# Patient Record
Sex: FEMALE | Race: BLACK OR AFRICAN AMERICAN | NOT HISPANIC OR LATINO | Employment: UNEMPLOYED | URBAN - METROPOLITAN AREA
[De-identification: names, ages, dates, MRNs, and addresses within clinical notes are randomized per-mention and may not be internally consistent; named-entity substitution may affect disease eponyms.]

---

## 2017-05-15 ENCOUNTER — OFFICE VISIT (OUTPATIENT)
Dept: OBSTETRICS AND GYNECOLOGY | Facility: CLINIC | Age: 27
End: 2017-05-15
Payer: MEDICAID

## 2017-05-15 ENCOUNTER — NURSE TRIAGE (OUTPATIENT)
Dept: ADMINISTRATIVE | Facility: CLINIC | Age: 27
End: 2017-05-15

## 2017-05-15 ENCOUNTER — LAB VISIT (OUTPATIENT)
Dept: LAB | Facility: HOSPITAL | Age: 27
End: 2017-05-15
Attending: NURSE PRACTITIONER
Payer: MEDICAID

## 2017-05-15 ENCOUNTER — TELEPHONE (OUTPATIENT)
Dept: OBSTETRICS AND GYNECOLOGY | Facility: CLINIC | Age: 27
End: 2017-05-15

## 2017-05-15 VITALS
HEIGHT: 62 IN | WEIGHT: 164.44 LBS | DIASTOLIC BLOOD PRESSURE: 86 MMHG | SYSTOLIC BLOOD PRESSURE: 120 MMHG | BODY MASS INDEX: 30.26 KG/M2

## 2017-05-15 DIAGNOSIS — O34.219 PREVIOUS CESAREAN SECTION COMPLICATING PREGNANCY: Primary | ICD-10-CM

## 2017-05-15 DIAGNOSIS — O34.219 PREVIOUS CESAREAN SECTION COMPLICATING PREGNANCY: ICD-10-CM

## 2017-05-15 LAB
ABO + RH BLD: NORMAL
BASOPHILS # BLD AUTO: 0.02 K/UL
BASOPHILS NFR BLD: 0.4 %
BILIRUB UR QL STRIP: NEGATIVE
BLD GP AB SCN CELLS X3 SERPL QL: NORMAL
CLARITY UR REFRACT.AUTO: CLEAR
COLOR UR AUTO: YELLOW
DIFFERENTIAL METHOD: ABNORMAL
EOSINOPHIL # BLD AUTO: 0.1 K/UL
EOSINOPHIL NFR BLD: 2.4 %
ERYTHROCYTE [DISTWIDTH] IN BLOOD BY AUTOMATED COUNT: 11.9 %
GLUCOSE UR QL STRIP: NEGATIVE
HCT VFR BLD AUTO: 38.9 %
HGB BLD-MCNC: 13.4 G/DL
HGB S BLD QL SOLY: NEGATIVE
HGB S BLD QL SOLY: NEGATIVE
HGB UR QL STRIP: NEGATIVE
KETONES UR QL STRIP: NEGATIVE
LEUKOCYTE ESTERASE UR QL STRIP: NEGATIVE
LYMPHOCYTES # BLD AUTO: 2.2 K/UL
LYMPHOCYTES NFR BLD: 40.3 %
MCH RBC QN AUTO: 32.4 PG
MCHC RBC AUTO-ENTMCNC: 34.4 %
MCV RBC AUTO: 94 FL
MONOCYTES # BLD AUTO: 0.5 K/UL
MONOCYTES NFR BLD: 8.6 %
NEUTROPHILS # BLD AUTO: 2.6 K/UL
NEUTROPHILS NFR BLD: 48.1 %
NITRITE UR QL STRIP: NEGATIVE
PH UR STRIP: 6 [PH] (ref 5–8)
PLATELET # BLD AUTO: 241 K/UL
PMV BLD AUTO: 10.8 FL
PROT UR QL STRIP: NEGATIVE
RBC # BLD AUTO: 4.14 M/UL
SP GR UR STRIP: 1.03 (ref 1–1.03)
URN SPEC COLLECT METH UR: NORMAL
UROBILINOGEN UR STRIP-ACNC: NEGATIVE EU/DL
WBC # BLD AUTO: 5.34 K/UL

## 2017-05-15 PROCEDURE — 86762 RUBELLA ANTIBODY: CPT

## 2017-05-15 PROCEDURE — 85025 COMPLETE CBC W/AUTO DIFF WBC: CPT

## 2017-05-15 PROCEDURE — 86703 HIV-1/HIV-2 1 RESULT ANTBDY: CPT

## 2017-05-15 PROCEDURE — 85660 RBC SICKLE CELL TEST: CPT

## 2017-05-15 PROCEDURE — 36415 COLL VENOUS BLD VENIPUNCTURE: CPT

## 2017-05-15 PROCEDURE — 86900 BLOOD TYPING SEROLOGIC ABO: CPT

## 2017-05-15 PROCEDURE — 86850 RBC ANTIBODY SCREEN: CPT

## 2017-05-15 PROCEDURE — 86592 SYPHILIS TEST NON-TREP QUAL: CPT

## 2017-05-15 PROCEDURE — 87340 HEPATITIS B SURFACE AG IA: CPT

## 2017-05-15 PROCEDURE — 99999 PR PBB SHADOW E&M-EST. PATIENT-LVL III: CPT | Mod: PBBFAC,,, | Performed by: NURSE PRACTITIONER

## 2017-05-15 PROCEDURE — 99203 OFFICE O/P NEW LOW 30 MIN: CPT | Mod: TH,S$PBB,, | Performed by: NURSE PRACTITIONER

## 2017-05-15 RX ORDER — PROMETHAZINE HYDROCHLORIDE 25 MG/1
25 TABLET ORAL EVERY 6 HOURS PRN
Qty: 30 TABLET | Refills: 0 | Status: SHIPPED | OUTPATIENT
Start: 2017-05-15 | End: 2017-05-16 | Stop reason: SDUPTHER

## 2017-05-15 NOTE — PROGRESS NOTES
"CC: Risk assessment    Hesham Palacios is a 27 y.o. female  presents for risk assessment.  LMP: Patient's last menstrual period was 2017. STANTON 2017. 6 w 2 d. OB h/o 2 term C/S( FTP and repeat) Patient had a h/o PIH with first delivery. Same FOB. Is breast feeding. Last pap exam was normal, . HSV, no outbreaks.      Past Medical History:   Diagnosis Date    Anemia     Herpes simplex virus (HSV) infection     Hx of migraine headaches      Past Surgical History:   Procedure Laterality Date     SECTION      x 2    DILATION AND CURETTAGE OF UTERUS       Social History     Social History    Marital status:      Spouse name: N/A    Number of children: N/A    Years of education: N/A     Occupational History    Not on file.     Social History Main Topics    Smoking status: Never Smoker    Smokeless tobacco: Never Used    Alcohol use No    Drug use: No    Sexual activity: Not Currently     Partners: Male     Birth control/ protection: None     Other Topics Concern    Not on file     Social History Narrative     Family History   Problem Relation Age of Onset    Breast cancer Neg Hx     Colon cancer Neg Hx     Ovarian cancer Neg Hx      OB History      Para Term  AB TAB SAB Ectopic Multiple Living    2 2 2      0 2          /86 (BP Location: Right arm, Patient Position: Sitting, BP Method: Manual)  Ht 5' 2" (1.575 m)  Wt 74.6 kg (164 lb 7.4 oz)  LMP 2017  BMI 30.08 kg/m2      ROS:  GENERAL: Denies weight gain or weight loss. Feeling well overall.   SKIN: Denies rash or lesions.   HEAD: Denies head injury or headache.   NODES: Denies enlarged lymph nodes.   CHEST: Denies chest pain or shortness of breath.   CARDIOVASCULAR: Denies palpitations or left sided chest pain.   ABDOMEN: No abdominal pain, constipation, diarrhea, nausea, vomiting or rectal bleeding.   URINARY: No frequency, dysuria, hematuria, or burning on " urination.  REPRODUCTIVE: See HPI.   BREASTS: The patient performs breast self-examination and denies pain, lumps, or nipple discharge.   HEMATOLOGIC: No easy bruisability or excessive bleeding.   MUSCULOSKELETAL: Denies joint pain or swelling.   NEUROLOGIC: Denies syncope or weakness.   PSYCHIATRIC: Denies depression, anxiety or mood swings.    PHYSICAL EXAM:  APPEARANCE: Obese AA female, in no acute distress.  AFFECT: WNL, alert and oriented x 3  SKIN: No acne or hirsutism  NECK: Neck symmetric without masses or thyromegaly  NODES: No inguinal, cervical, axillary, or femoral lymph node enlargement  CHEST: Good respiratory effect  ABDOMEN: Soft.  No tenderness or masses.  PELVIC: Normal external genitalia without lesions.  Normal hair distribution.  Adequate perineal body, normal urethral meatus.  Vagina moist and well rugated without lesions or discharge.  Cervix pink, without lesions, discharge or tenderness. Bimanual exam shows uterus to be 6-7 week size, regular, mobile and nontender.  Adnexa without masses or tenderness.       PLAN:  Initial labs  Initial OB visit and ultrasound

## 2017-05-15 NOTE — TELEPHONE ENCOUNTER
Reason for Disposition   Caller has medication question, adult has minor symptoms, caller declines triage, and triager answers question    Answer Assessment - Initial Assessment Questions  Pt seen today- stated med for nausea was to be sent to pharmacy but they denied reciept    Protocols used: ST MEDICATION QUESTION CALL-A-    Called phenergan per med card to  at [facility] per pt's chart.

## 2017-05-15 NOTE — TELEPHONE ENCOUNTER
----- Message from Nimo Spivey sent at 5/15/2017  4:19 PM CDT -----  Contact: Pt   States she is calling to follow up on her rx being called in to her pharm and can be reached at [phone]//erwin/liliam       Pt pharm is   [facility] [id number] [place] [address] - [address]  [address] [facility] [address]  Phone: [phone] Fax: [phone]

## 2017-05-15 NOTE — MR AVS SNAPSHOT
[facility]/ GYN  [address]  [place] [address]  Phone: [phone]  Fax: [phone]                  Hesham Palacios   5/15/2017 10:00 AM   Office Visit    Description:  Female : 1990   Provider:  Meghana Messer NP   Department:  [facility]           Reason for Visit     Possible Pregnancy           Diagnoses this Visit        Comments    Previous  section complicating pregnancy    -  Primary            To Do List           Future Appointments        Provider Department Dept Phone    5/15/2017 1:45 PM LAB, SAME DAY [facility] [phone]    2017 2:00 PM ULTRASOUND, OB-GYN [facility] OB/ [facility] [phone]    2017 2:45 PM Viky Hsu CNM [facility] OB/ -[phone]      Goals (5 Years of Data)     None       These Medications        Disp Refills Start End    promethazine (PHENERGAN) 25 MG tablet 30 tablet 0 5/15/2017     Take 1 tablet (25 mg total) by mouth every 6 (six) hours as needed for Nausea. - Oral    Pharmacy: [facility] [address] [place] [address] Ph #: [phone]         [facility]     [facility] Nurse Care [facility] - 24/7 Assistance  Unless otherwise directed by your provider, please contact [facility], our nurse care line that is available for 24/7 assistance.     Registered nurses in the [facility] provide: appointment scheduling, clinical advisement, health education, and other advisory services.  Call: [phone] (toll free)               Medications           Message regarding Medications     Verify the changes and/or additions to your medication regime listed below are the same as discussed with your clinician today.  If any of these changes or additions are incorrect, please notify your healthcare provider.        START taking these NEW medications        Refills    promethazine (PHENERGAN) 25 MG tablet 0    Sig:  "Take 1 tablet (25 mg total) by mouth every 6 (six) hours as needed for Nausea.    Class: Print    Route: Oral      STOP taking these medications     alprazolam (XANAX) 0.25 MG tablet Take 1 tablet (0.25 mg total) by mouth 2 (two) times daily as needed for Anxiety.    ibuprofen (ADVIL,MOTRIN) 600 MG tablet Take 600 mg by mouth 3 (three) times daily.           Verify that the below list of medications is an accurate representation of the medications you are currently taking.  If none reported, the list may be blank. If incorrect, please contact your healthcare provider. Carry this list with you in case of emergency.           Current Medications     promethazine (PHENERGAN) 25 MG tablet Take 1 tablet (25 mg total) by mouth every 6 (six) hours as needed for Nausea.           Clinical Reference Information           Your Vitals Were     BP Height Weight Last Period BMI    120/86 (BP Location: Right arm, Patient Position: Sitting, BP Method: Manual) 5' 2" (1.575 m) 74.6 kg (164 lb 7.4 oz) 04/01/2017 30.08 kg/m2      Blood Pressure          Most Recent Value    BP  120/86      Allergies as of 5/15/2017     Morphine      Immunizations Administered on Date of Encounter - 5/15/2017     None      Orders Placed During Today's Visit      Normal Orders This Visit    C. trachomatis/N. gonorrhoeae by AMP DNA Cervicovaginal     POCT urine pregnancy     Urinalysis     Urine culture     Future Labs/Procedures Expected by Expires    CBC auto differential  5/15/2017 7/14/2018    Glucose, fasting  5/15/2017 7/14/2018    Hepatitis B surface antigen  5/15/2017 7/14/2018    HIV-1 and HIV-2 antibodies  5/15/2017 7/14/2018    RPR  5/15/2017 7/14/2018    Rubella antibody, IgG  5/15/2017 7/14/2018    SICKLE CELL SCREEN  5/15/2017 7/14/2018    SICKLE CELL SCREEN  5/15/2017 7/14/2018    Type & Screen - Ob Profile  5/15/2017 7/14/2018    US OB/GYN Procedure ([facility])  As directed 5/15/2018      [facility] Sign-Up     Activating your [facility] " account is as easy as 1-2-3!     1) Visit [email or web address], select Sign Up Now, enter this activation code and your date of birth, then select Next.  Activation code not generated  Current Patient Portal Status: Account disabled      2) Create a username and password to use when you visit [facility] in the future and select a security question in case you lose your password and select Next.    3) Enter your e-mail address and click Sign Up!    Additional Information  If you have questions, please e-mail [email or web address] or call [phone] to talk to our [facility] staff. Remember, [facility] is NOT to be used for urgent needs. For medical emergencies, dial 911.         Language Assistance Services     ATTENTION: Language assistance services are available, free of charge. Please call [phone].      ATENCIÓN: Si habla español, tiene a cruz disposición servicios gratuitos de asistencia lingüística. Llame al [phone].     CHÚ Ý: N?u b?n nói Ti?ng Vi?t, có các d?ch v? h? tr? ngôn ng? mi?n phí dành cho b?n. G?i s? [phone].         [facility] complies with applicable Federal civil rights laws and does not discriminate on the basis of race, color, national origin, age, disability, or sex.

## 2017-05-16 ENCOUNTER — TELEPHONE (OUTPATIENT)
Dept: OBSTETRICS AND GYNECOLOGY | Facility: CLINIC | Age: 27
End: 2017-05-16

## 2017-05-16 LAB
BACTERIA UR CULT: NO GROWTH
C TRACH DNA SPEC QL NAA+PROBE: NOT DETECTED
HBV SURFACE AG SERPL QL IA: NEGATIVE
HIV 1+2 AB+HIV1 P24 AG SERPL QL IA: NEGATIVE
N GONORRHOEA DNA SPEC QL NAA+PROBE: NOT DETECTED
RPR SER QL: NORMAL
RUBV IGG SER-ACNC: 70.4 IU/ML
RUBV IGG SER-IMP: REACTIVE

## 2017-05-16 RX ORDER — PROMETHAZINE HYDROCHLORIDE 25 MG/1
25 TABLET ORAL EVERY 6 HOURS PRN
Qty: 30 TABLET | Refills: 0 | Status: SHIPPED | OUTPATIENT
Start: 2017-05-16 | End: 2017-06-22

## 2017-05-16 NOTE — TELEPHONE ENCOUNTER
----- Message from Meghana Messer NP sent at 5/16/2017  8:43 AM CDT -----  Please call patient and inform her that labs are normal.

## 2017-05-23 ENCOUNTER — INITIAL PRENATAL (OUTPATIENT)
Dept: OBSTETRICS AND GYNECOLOGY | Facility: CLINIC | Age: 27
End: 2017-05-23
Payer: MEDICAID

## 2017-05-23 ENCOUNTER — PROCEDURE VISIT (OUTPATIENT)
Dept: OBSTETRICS AND GYNECOLOGY | Facility: CLINIC | Age: 27
End: 2017-05-23
Payer: MEDICAID

## 2017-05-23 VITALS — DIASTOLIC BLOOD PRESSURE: 62 MMHG | BODY MASS INDEX: 30.6 KG/M2 | SYSTOLIC BLOOD PRESSURE: 122 MMHG | WEIGHT: 167.31 LBS

## 2017-05-23 DIAGNOSIS — O09.291 HISTORY OF PRE-ECLAMPSIA IN PRIOR PREGNANCY, CURRENTLY PREGNANT, FIRST TRIMESTER: Primary | ICD-10-CM

## 2017-05-23 DIAGNOSIS — Z98.891 HISTORY OF CESAREAN SECTION: ICD-10-CM

## 2017-05-23 DIAGNOSIS — O34.219 PREVIOUS CESAREAN SECTION COMPLICATING PREGNANCY: ICD-10-CM

## 2017-05-23 PROCEDURE — 99204 OFFICE O/P NEW MOD 45 MIN: CPT | Mod: TH,S$PBB,, | Performed by: ADVANCED PRACTICE MIDWIFE

## 2017-05-23 PROCEDURE — 76801 OB US < 14 WKS SINGLE FETUS: CPT | Mod: 26,S$PBB,, | Performed by: OBSTETRICS & GYNECOLOGY

## 2017-05-23 PROCEDURE — 99213 OFFICE O/P EST LOW 20 MIN: CPT | Mod: PBBFAC | Performed by: ADVANCED PRACTICE MIDWIFE

## 2017-05-23 PROCEDURE — 99999 PR PBB SHADOW E&M-EST. PATIENT-LVL III: CPT | Mod: PBBFAC,,, | Performed by: ADVANCED PRACTICE MIDWIFE

## 2017-05-23 NOTE — PROGRESS NOTES
NOBV, oriented to practice, labs reviewed, intro to coffective andreia, blue bag materials reviewed, warning signs discussed, Zika and dietary recommendations made. Pt denies concerns/complaints. Ayana/al

## 2017-06-12 ENCOUNTER — PATIENT MESSAGE (OUTPATIENT)
Dept: OBSTETRICS AND GYNECOLOGY | Facility: CLINIC | Age: 27
End: 2017-06-12

## 2017-06-22 ENCOUNTER — ROUTINE PRENATAL (OUTPATIENT)
Dept: OBSTETRICS AND GYNECOLOGY | Facility: CLINIC | Age: 27
End: 2017-06-22
Payer: MEDICAID

## 2017-06-22 VITALS
DIASTOLIC BLOOD PRESSURE: 68 MMHG | WEIGHT: 171.75 LBS | SYSTOLIC BLOOD PRESSURE: 104 MMHG | BODY MASS INDEX: 31.41 KG/M2

## 2017-06-22 DIAGNOSIS — Z98.891 HISTORY OF CESAREAN SECTION: ICD-10-CM

## 2017-06-22 PROBLEM — B00.9 HERPES SIMPLEX VIRUS TYPE 2 (HSV-2) INFECTION AFFECTING PREGNANCY IN FIRST TRIMESTER, ANTEPARTUM: Status: ACTIVE | Noted: 2017-06-22

## 2017-06-22 PROBLEM — O98.511 HERPES SIMPLEX VIRUS TYPE 2 (HSV-2) INFECTION AFFECTING PREGNANCY IN FIRST TRIMESTER, ANTEPARTUM: Status: ACTIVE | Noted: 2017-06-22

## 2017-06-22 PROCEDURE — 99213 OFFICE O/P EST LOW 20 MIN: CPT | Mod: TH,S$PBB,, | Performed by: OBSTETRICS & GYNECOLOGY

## 2017-06-22 PROCEDURE — 99212 OFFICE O/P EST SF 10 MIN: CPT | Mod: PBBFAC | Performed by: OBSTETRICS & GYNECOLOGY

## 2017-06-22 PROCEDURE — 99999 PR PBB SHADOW E&M-EST. PATIENT-LVL II: CPT | Mod: PBBFAC,,, | Performed by: OBSTETRICS & GYNECOLOGY

## 2017-06-22 NOTE — PROGRESS NOTES
Rec daily ASA due to history of pre-e, admits did not take regularly with last pregnancy  Still with residual nausea, improving  Desires QUAD between 16-20 weeks   Desires repeat  with BTL  Still currently breastfeeding 3 year old

## 2017-07-20 ENCOUNTER — ROUTINE PRENATAL (OUTPATIENT)
Dept: OBSTETRICS AND GYNECOLOGY | Facility: CLINIC | Age: 27
End: 2017-07-20
Payer: MEDICAID

## 2017-07-20 ENCOUNTER — LAB VISIT (OUTPATIENT)
Dept: LAB | Facility: HOSPITAL | Age: 27
End: 2017-07-20
Attending: MIDWIFE
Payer: MEDICAID

## 2017-07-20 VITALS
SYSTOLIC BLOOD PRESSURE: 120 MMHG | WEIGHT: 174.81 LBS | DIASTOLIC BLOOD PRESSURE: 76 MMHG | BODY MASS INDEX: 31.98 KG/M2

## 2017-07-20 DIAGNOSIS — Z3A.15 15 WEEKS GESTATION OF PREGNANCY: ICD-10-CM

## 2017-07-20 DIAGNOSIS — Z98.891 HISTORY OF CESAREAN SECTION: Primary | ICD-10-CM

## 2017-07-20 DIAGNOSIS — O09.292 HISTORY OF PRE-ECLAMPSIA IN PRIOR PREGNANCY, CURRENTLY PREGNANT, SECOND TRIMESTER: ICD-10-CM

## 2017-07-20 DIAGNOSIS — Z36.3 ANTENATAL SCREENING FOR MALFORMATION USING ULTRASONICS: ICD-10-CM

## 2017-07-20 PROCEDURE — 81511 FTL CGEN ABNOR FOUR ANAL: CPT

## 2017-07-20 PROCEDURE — 99999 PR PBB SHADOW E&M-EST. PATIENT-LVL II: CPT | Mod: PBBFAC,,, | Performed by: MIDWIFE

## 2017-07-20 PROCEDURE — 36415 COLL VENOUS BLD VENIPUNCTURE: CPT

## 2017-07-20 PROCEDURE — 99212 OFFICE O/P EST SF 10 MIN: CPT | Mod: TH,S$PBB,, | Performed by: MIDWIFE

## 2017-07-20 NOTE — PROGRESS NOTES
Complaints today: acne on face and back    /76   Wt 79.3 kg (174 lb 13.2 oz)   LMP 2017   BMI 31.98 kg/m²     27 y.o., at 15w5d by Estimated Date of Delivery: 18  Patient Active Problem List   Diagnosis    History of  section    History of pre-eclampsia in prior pregnancy, currently pregnant    Herpes simplex virus type 2 (HSV-2) infection affecting pregnancy in first trimester, antepartum     OB History    Para Term  AB Living   3 2 2     2   SAB TAB Ectopic Multiple Live Births         0 2      # Outcome Date GA Lbr Virgilio/2nd Weight Sex Delivery Anes PTL Lv   3 Current            2 Term 16 41w1d  3.94 kg (8 lb 11 oz) M CS-LTranv EPI N DANIEL   1 Term 11/23/10 40w0d  3.771 kg (8 lb 5 oz) M CS-Unspec   DANIEL      Complications: Preeclampsia          Dating reviewed    Allergies and problem list reviewed and updated    Medical and surgical history reviewed    Prenatal labs reviewed and updated    Physical Exam:  ABD: soft, gravid, nontender  Mild acne noted on face and back    Assessment:  Previous c/s    Plan:   May use mild facial cleanser as needed  Quad screen next visit  Anatomy u/s next visit   follow up 4 Weeks    DELFINA Glez

## 2017-07-24 LAB
# FETUSES US: NORMAL
2ND TRIMESTER 4 SCREEN PNL SERPL: NEGATIVE
2ND TRIMESTER 4 SCREEN SERPL-IMP: NORMAL
AFP MOM SERPL: 0.8
AFP SERPL-MCNC: 28.3 NG/ML
AGE AT DELIVERY: 26
B-HCG MOM SERPL: 0.72
B-HCG SERPL-ACNC: 25.1 IU/ML
FET TS 21 RISK FROM MAT AGE: NORMAL
GA (DAYS): 2 D
GA (WEEKS): 16 WK
GA METHOD: NORMAL
IDDM PATIENT QL: NORMAL
INHIBIN A MOM SERPL: 0.75
INHIBIN A SERPL-MCNC: 112.7 PG/ML
TS 18 RISK FETUS: NORMAL
TS 21 RISK FETUS: NORMAL
U ESTRIOL MOM SERPL: 0.71
U ESTRIOL SERPL-MCNC: 0.6 NG/ML

## 2017-08-17 ENCOUNTER — ROUTINE PRENATAL (OUTPATIENT)
Dept: OBSTETRICS AND GYNECOLOGY | Facility: CLINIC | Age: 27
End: 2017-08-17
Payer: MEDICAID

## 2017-08-17 ENCOUNTER — PROCEDURE VISIT (OUTPATIENT)
Dept: OBSTETRICS AND GYNECOLOGY | Facility: CLINIC | Age: 27
End: 2017-08-17
Payer: MEDICAID

## 2017-08-17 VITALS
DIASTOLIC BLOOD PRESSURE: 86 MMHG | WEIGHT: 181.88 LBS | BODY MASS INDEX: 33.27 KG/M2 | SYSTOLIC BLOOD PRESSURE: 124 MMHG

## 2017-08-17 DIAGNOSIS — Z98.891 HISTORY OF CESAREAN SECTION: Primary | ICD-10-CM

## 2017-08-17 DIAGNOSIS — Z36.3 ANTENATAL SCREENING FOR MALFORMATION USING ULTRASONICS: ICD-10-CM

## 2017-08-17 DIAGNOSIS — O09.292 HISTORY OF PRE-ECLAMPSIA IN PRIOR PREGNANCY, CURRENTLY PREGNANT, SECOND TRIMESTER: ICD-10-CM

## 2017-08-17 DIAGNOSIS — Z3A.19 19 WEEKS GESTATION OF PREGNANCY: ICD-10-CM

## 2017-08-17 PROCEDURE — 76805 OB US >/= 14 WKS SNGL FETUS: CPT | Mod: 26,S$PBB,, | Performed by: OBSTETRICS & GYNECOLOGY

## 2017-08-17 PROCEDURE — 99999 PR PBB SHADOW E&M-EST. PATIENT-LVL II: CPT | Mod: PBBFAC,,, | Performed by: MIDWIFE

## 2017-08-17 PROCEDURE — 99212 OFFICE O/P EST SF 10 MIN: CPT | Mod: TH,S$PBB,25, | Performed by: MIDWIFE

## 2017-08-17 PROCEDURE — 3008F BODY MASS INDEX DOCD: CPT | Mod: ,,, | Performed by: MIDWIFE

## 2017-08-17 PROCEDURE — 99212 OFFICE O/P EST SF 10 MIN: CPT | Mod: PBBFAC | Performed by: MIDWIFE

## 2017-08-17 RX ORDER — PANTOPRAZOLE SODIUM 20 MG/1
20 TABLET, DELAYED RELEASE ORAL DAILY
Qty: 30 TABLET | Refills: 3 | Status: SHIPPED | OUTPATIENT
Start: 2017-08-17 | End: 2017-12-21

## 2017-08-17 RX ORDER — NAPROXEN SODIUM 220 MG/1
81 TABLET, FILM COATED ORAL DAILY
COMMUNITY
End: 2017-12-21

## 2017-08-17 NOTE — PROGRESS NOTES
Complaints today: indigestion    LMP 2017     27 y.o., at 19w5d by Estimated Date of Delivery: 18  Patient Active Problem List   Diagnosis    History of  section    History of pre-eclampsia in prior pregnancy, currently pregnant    Herpes simplex virus type 2 (HSV-2) infection affecting pregnancy in first trimester, antepartum     OB History    Para Term  AB Living   3 2 2     2   SAB TAB Ectopic Multiple Live Births         0 2      # Outcome Date GA Lbr Virgilio/2nd Weight Sex Delivery Anes PTL Lv   3 Current            2 Term 16 41w1d  3.94 kg (8 lb 11 oz) M CS-LTranv EPI N DANIEL   1 Term 11/23/10 40w0d  3.771 kg (8 lb 5 oz) M CS-Unspec   DANIEL      Complications: Preeclampsia          Dating reviewed    Allergies and problem list reviewed and updated    Medical and surgical history reviewed    Prenatal labs reviewed and updated    Physical Exam:  ABD: soft, gravid, nontender,   US: Sub opt heart, possible arrythmia vs heart block, all other anatomy normal    Assessment:  Possible arrythmia vs heart block    Plan:   MFM in 2 weeks   follow up 2Weeks, kick counts, labor precautions

## 2017-08-30 ENCOUNTER — OFFICE VISIT (OUTPATIENT)
Dept: OBSTETRICS AND GYNECOLOGY | Facility: CLINIC | Age: 27
End: 2017-08-30
Payer: MEDICAID

## 2017-08-30 DIAGNOSIS — O09.299 HISTORY OF PRE-ECLAMPSIA IN PRIOR PREGNANCY, CURRENTLY PREGNANT: Primary | ICD-10-CM

## 2017-08-30 DIAGNOSIS — O09.292 HISTORY OF PRE-ECLAMPSIA IN PRIOR PREGNANCY, CURRENTLY PREGNANT, SECOND TRIMESTER: ICD-10-CM

## 2017-08-30 DIAGNOSIS — O36.8390 FETAL ARRHYTHMIA AFFECTING PREGNANCY, ANTEPARTUM: ICD-10-CM

## 2017-08-30 PROCEDURE — 99999 PR PBB SHADOW E&M-EST. PATIENT-LVL I: CPT | Mod: PBBFAC,,,

## 2017-08-30 PROCEDURE — 99212 OFFICE O/P EST SF 10 MIN: CPT | Mod: GT,S$PBB,TH,25 | Performed by: OBSTETRICS & GYNECOLOGY

## 2017-08-30 PROCEDURE — 76811 OB US DETAILED SNGL FETUS: CPT | Mod: 26,S$PBB,, | Performed by: OBSTETRICS & GYNECOLOGY

## 2017-08-30 PROCEDURE — 76811 OB US DETAILED SNGL FETUS: CPT | Mod: PBBFAC,PO | Performed by: OBSTETRICS & GYNECOLOGY

## 2017-08-30 PROCEDURE — 99211 OFF/OP EST MAY X REQ PHY/QHP: CPT | Mod: PBBFAC,PO

## 2017-08-30 NOTE — PROGRESS NOTES
Today the finding of premature atrial contractions (PACs) were noted on ultrasound.  PACs, along with PVCs, are the most common of fetal arrythmia accounting for over 90% of fetal arrythmias.  PACs are historically benign and mot resolve by the time of delivery.  It is extremely rare for PACs to cause problems in the .  They usually require no treatment and can be decreased with the limiting of maternal caffeine, alcohol and nicotine intake.  She denies any caffeine , tobacco or decongestant intake.  Suggest reevaluation 2 weeks for FHR.  Of note the FOB gives a history of an arrhythmia in himself, his brother and dad that was diagnosed as athletes arrhythmia.  Not sure if this is an actual arrhythmia or exaggerated respiratory variation.  He will get records before next visit.

## 2017-08-31 ENCOUNTER — TELEPHONE (OUTPATIENT)
Dept: OBSTETRICS AND GYNECOLOGY | Facility: CLINIC | Age: 27
End: 2017-08-31

## 2017-08-31 NOTE — TELEPHONE ENCOUNTER
----- Message from Kira Sierra sent at 8/31/2017  9:33 AM CDT -----  Contact: PT   Pt was suppose to get information on her babies father cardo records for dr because baby has an irregular heart beat,,,Dr. Shwetha Majano ,,, please call pt back at [phone]

## 2017-08-31 NOTE — TELEPHONE ENCOUNTER
----- Message from Kaylen Avery sent at 8/31/2017  1:40 PM CDT -----  Contact: patient  Returning your call. Please call patient ASAP today @ [phone]. Thanks, arturo

## 2017-09-13 ENCOUNTER — PATIENT MESSAGE (OUTPATIENT)
Dept: OBSTETRICS AND GYNECOLOGY | Facility: CLINIC | Age: 27
End: 2017-09-13

## 2017-09-14 ENCOUNTER — TELEPHONE (OUTPATIENT)
Dept: OBSTETRICS AND GYNECOLOGY | Facility: CLINIC | Age: 27
End: 2017-09-14

## 2017-09-14 ENCOUNTER — ROUTINE PRENATAL (OUTPATIENT)
Dept: OBSTETRICS AND GYNECOLOGY | Facility: CLINIC | Age: 27
End: 2017-09-14
Payer: MEDICAID

## 2017-09-14 VITALS
BODY MASS INDEX: 34.35 KG/M2 | SYSTOLIC BLOOD PRESSURE: 120 MMHG | WEIGHT: 187.81 LBS | DIASTOLIC BLOOD PRESSURE: 70 MMHG

## 2017-09-14 DIAGNOSIS — Z98.891 HISTORY OF CESAREAN SECTION: Primary | ICD-10-CM

## 2017-09-14 PROCEDURE — 3008F BODY MASS INDEX DOCD: CPT | Mod: ,,, | Performed by: ADVANCED PRACTICE MIDWIFE

## 2017-09-14 PROCEDURE — 99999 PR PBB SHADOW E&M-EST. PATIENT-LVL II: CPT | Mod: PBBFAC,,, | Performed by: ADVANCED PRACTICE MIDWIFE

## 2017-09-14 PROCEDURE — 99213 OFFICE O/P EST LOW 20 MIN: CPT | Mod: TH,S$PBB,, | Performed by: ADVANCED PRACTICE MIDWIFE

## 2017-09-14 PROCEDURE — 99212 OFFICE O/P EST SF 10 MIN: CPT | Mod: PBBFAC,PO | Performed by: ADVANCED PRACTICE MIDWIFE

## 2017-09-14 RX ORDER — BUTALBITAL, ACETAMINOPHEN AND CAFFEINE 50; 325; 40 MG/1; MG/1; MG/1
1 TABLET ORAL EVERY 4 HOURS PRN
Qty: 30 TABLET | Refills: 1 | Status: SHIPPED | OUTPATIENT
Start: 2017-09-14 | End: 2017-10-14

## 2017-09-14 NOTE — TELEPHONE ENCOUNTER
----- Message from Gabriela Bourgeois sent at 9/14/2017  8:38 AM CDT -----  Hey this pt is 24 week and not feeling well. Pt is have a cold and a ear infection. Please advised. [facility] as well.....please call pt back at [phone].pt really wants an appt today.

## 2017-09-14 NOTE — TELEPHONE ENCOUNTER
----- Message from Gabriela Bourgeois sent at 9/14/2017  8:38 AM CDT -----  Hey this pt is 24 week and not feeling well. Pt is have a cold and a ear infection. Please advised. [facility] uses [facility] well.....please call pt back at [phone].pt really wants an appt today.

## 2017-09-14 NOTE — PROGRESS NOTES
Today- no arhythmia noted- active baby with good accelerations- Has MFM FU in 2 weeks  28 week lab next visit  Breast  Considering Depo  Cold symptoms- OTC meds advised    Coffective counseling sheet Fall In Love discussed with mother. Reinforced immediate skin to skin, the magic first hour, importance of the first feeding and delaying routine procedures. Encouraged mother to download Coffective mobile andreia if she has not already done so. Mother verbalizes understanding.

## 2017-09-26 ENCOUNTER — TELEPHONE (OUTPATIENT)
Dept: OBSTETRICS AND GYNECOLOGY | Facility: CLINIC | Age: 27
End: 2017-09-26

## 2017-09-26 NOTE — TELEPHONE ENCOUNTER
Spoke with the patient and informed her that I refaxed it to the second number she gave us but it still isn't going through. Patient states that she came up here earlier and got a dental referral from [facility] but she isn't going to be able to go to the Dentist until 10/2/17.

## 2017-09-26 NOTE — TELEPHONE ENCOUNTER
----- Message from Nimo Spivey sent at 9/26/2017 11:41 AM CDT -----  Contact: pt   States she gave the wrong fax number to her referral sent to see the dentist and can be reached at [phone]/[email or web address]     Fax # [phone]

## 2017-09-26 NOTE — TELEPHONE ENCOUNTER
----- Message from Olinda Chu sent at 9/26/2017  9:53 AM CDT -----  Contact: OOC  Good morning,    Patient is requesting a letter to confirm she is cleared to have dental work or receive pain medication.  Dentist is requesting the release.  Please contact patient at [phone] or fax to [phone].  Patient's email is [email or web address].    Thank you.

## 2017-09-27 ENCOUNTER — OFFICE VISIT (OUTPATIENT)
Dept: OBSTETRICS AND GYNECOLOGY | Facility: CLINIC | Age: 27
End: 2017-09-27
Payer: MEDICAID

## 2017-09-27 DIAGNOSIS — O36.8390 FETAL ARRHYTHMIA AFFECTING PREGNANCY, ANTEPARTUM: Primary | ICD-10-CM

## 2017-09-27 PROCEDURE — 99213 OFFICE O/P EST LOW 20 MIN: CPT | Mod: GT,S$PBB,TH,25 | Performed by: OBSTETRICS & GYNECOLOGY

## 2017-09-27 PROCEDURE — 76816 OB US FOLLOW-UP PER FETUS: CPT | Mod: PBBFAC,PO | Performed by: OBSTETRICS & GYNECOLOGY

## 2017-09-27 PROCEDURE — 76816 OB US FOLLOW-UP PER FETUS: CPT | Mod: 26,S$PBB,, | Performed by: OBSTETRICS & GYNECOLOGY

## 2017-09-27 NOTE — PROGRESS NOTES
See viewpoint US report    Discussed polyhydramnios and absence of arrhythmia on today's scan.  If no change in 2 weeks no need for further MFM involvement.

## 2017-09-29 ENCOUNTER — PATIENT MESSAGE (OUTPATIENT)
Dept: OBSTETRICS AND GYNECOLOGY | Facility: CLINIC | Age: 27
End: 2017-09-29

## 2017-10-06 ENCOUNTER — LAB VISIT (OUTPATIENT)
Dept: LAB | Facility: HOSPITAL | Age: 27
End: 2017-10-06
Attending: ADVANCED PRACTICE MIDWIFE
Payer: MEDICAID

## 2017-10-06 DIAGNOSIS — Z98.891 HISTORY OF CESAREAN SECTION: ICD-10-CM

## 2017-10-06 LAB
BASOPHILS # BLD AUTO: 0.01 K/UL
BASOPHILS NFR BLD: 0.1 %
DIFFERENTIAL METHOD: ABNORMAL
EOSINOPHIL # BLD AUTO: 0.2 K/UL
EOSINOPHIL NFR BLD: 3 %
ERYTHROCYTE [DISTWIDTH] IN BLOOD BY AUTOMATED COUNT: 13 %
GLUCOSE SERPL-MCNC: 87 MG/DL
HCT VFR BLD AUTO: 32.4 %
HGB BLD-MCNC: 11.2 G/DL
LYMPHOCYTES # BLD AUTO: 1.8 K/UL
LYMPHOCYTES NFR BLD: 23.8 %
MCH RBC QN AUTO: 33.4 PG
MCHC RBC AUTO-ENTMCNC: 34.6 G/DL
MCV RBC AUTO: 97 FL
MONOCYTES # BLD AUTO: 0.8 K/UL
MONOCYTES NFR BLD: 11 %
NEUTROPHILS # BLD AUTO: 4.5 K/UL
NEUTROPHILS NFR BLD: 61.7 %
PLATELET # BLD AUTO: 223 K/UL
PMV BLD AUTO: 10.3 FL
RBC # BLD AUTO: 3.35 M/UL
WBC # BLD AUTO: 7.36 K/UL

## 2017-10-06 PROCEDURE — 36415 COLL VENOUS BLD VENIPUNCTURE: CPT | Mod: PO

## 2017-10-06 PROCEDURE — 82950 GLUCOSE TEST: CPT

## 2017-10-06 PROCEDURE — 86592 SYPHILIS TEST NON-TREP QUAL: CPT

## 2017-10-06 PROCEDURE — 86703 HIV-1/HIV-2 1 RESULT ANTBDY: CPT

## 2017-10-06 PROCEDURE — 85025 COMPLETE CBC W/AUTO DIFF WBC: CPT

## 2017-10-07 LAB — RPR SER QL: NORMAL

## 2017-10-09 ENCOUNTER — PATIENT MESSAGE (OUTPATIENT)
Dept: OBSTETRICS AND GYNECOLOGY | Facility: CLINIC | Age: 27
End: 2017-10-09

## 2017-10-09 ENCOUNTER — ROUTINE PRENATAL (OUTPATIENT)
Dept: OBSTETRICS AND GYNECOLOGY | Facility: CLINIC | Age: 27
End: 2017-10-09
Payer: MEDICAID

## 2017-10-09 VITALS
WEIGHT: 196.63 LBS | BODY MASS INDEX: 35.97 KG/M2 | SYSTOLIC BLOOD PRESSURE: 117 MMHG | DIASTOLIC BLOOD PRESSURE: 71 MMHG

## 2017-10-09 DIAGNOSIS — O40.9XX0 POLYHYDRAMNIOS AFFECTING PREGNANCY: ICD-10-CM

## 2017-10-09 DIAGNOSIS — Z98.891 HISTORY OF CESAREAN SECTION: Primary | ICD-10-CM

## 2017-10-09 LAB — HIV 1+2 AB+HIV1 P24 AG SERPL QL IA: NEGATIVE

## 2017-10-09 PROCEDURE — 90471 IMMUNIZATION ADMIN: CPT | Mod: PBBFAC,PO,VFC

## 2017-10-09 PROCEDURE — 99212 OFFICE O/P EST SF 10 MIN: CPT | Mod: PBBFAC,PO,25 | Performed by: ADVANCED PRACTICE MIDWIFE

## 2017-10-09 PROCEDURE — 99213 OFFICE O/P EST LOW 20 MIN: CPT | Mod: TH,S$PBB,, | Performed by: ADVANCED PRACTICE MIDWIFE

## 2017-10-09 PROCEDURE — 90715 TDAP VACCINE 7 YRS/> IM: CPT | Mod: PBBFAC,SL,PO

## 2017-10-09 PROCEDURE — 99999 PR PBB SHADOW E&M-EST. PATIENT-LVL II: CPT | Mod: PBBFAC,,, | Performed by: ADVANCED PRACTICE MIDWIFE

## 2017-10-09 NOTE — NURSING
Used two patient identifiers. Verified allergies. Tdap IM immunization given to left deltoid. Patient tolerated well. Asked patient to wait in clinic for 15 minutes to be monitored for adverse reaction. Patient verbalized understanding.

## 2017-10-09 NOTE — PROGRESS NOTES
NO US as no availability today- will reschedule next available to FU polyhydramnios. No arrhyhtmia heard today  Passed glucose  Tdap today  Breast  Info re LARCs     Coffective counseling sheet Get Ready discussed with mother. Reinforced avoiding induction of labor unless medically indicated as well as comfort measures during labor.  Encouraged mother to download Coffective mobile andreia if she has not already done so. Mother verbalizes understanding.      Coffective counseling sheet Learn Your Baby discussed with mother. Instructed regarding feeding cues and methods to calm baby. Encouraged mother to download Coffective mobile andreia if she has not already done so.  Mother verbalized understanding.     Coffective counseling sheet Nourish discussed with mother. Reinforced basic breastfeeding position and latch as well as proper hand expression technique. Encouraged mother to download Coffective mobile andreia if she has not already done so.  Mother verbalizes understanding.    Coffective counseling sheet Keep Baby Close discussed with mother. Reinforced rooming in practices, continued skin to skin, and quiet hours as requested by mother.  Encouraged mother to download Coffective mobile andreia if she has not already done so. Mother verbalizes understanding.

## 2017-10-09 NOTE — PATIENT INSTRUCTIONS
Adapting to Pregnancy: Second Trimester  Keep up the healthy habits you started in your first trimester. You might be a little more tired than normal. So plan your day wisely. Look at the tips below and choose the ones that suit your lifestyle.    If you have any questions, check with your healthcare provider.   If you work  If you can, adjust your work with your employer to fit your needs. Try these tips:  · If you stand for long periods, find ways to do some tasks while sitting. Also, try to stand with 1 foot resting on a low stool or ledge. Shift your weight from foot to foot often. Wear low-heeled shoes.  · If you sit, keep your knees level with your hips. Rest your feet on a firm surface. Sit tall with support for your low back.  · If you work long hours, ask about adjusting your schedule. Try taking shorter breaks more often.  When you travel  The second trimester may be the best time for any travel. Talk to your healthcare provider about any special plans you may need to make. Always:  · Wear a seat belt. Fasten the lap part under your belly. Wear the shoulder part also.  · Take breaks often during long trips by car or plane. Move around to stretch your legs.  · Drink plenty of fluids on flights. The air in plane cabins is very dry.  · Avoid hot climates or high altitudes if you are not used to them.  · Avoid places where the food and water might make you sick.  · Make sure you are up-to-date on all immunizations, including the flu vaccine. This is especially important when traveling overseas.  Taking time to relax  Find time to rest and relax at work or at home:  · Take short time-outs daily. Do relaxation exercises.  · Breathe deeply during stressful times.  · Try not to take on too much. Plan tasks for times when you have the most energy.  · Take naps when you can. Or just sit and relax.  · After week 16, avoid lying on your back for more than a few minutes. Instead, lie on your side. Switch sides  often.  Continuing as lovers  Unless your healthcare provider tells you otherwise, there is no reason to stop having sex now. Blood supply increases to the pelvic area in the second trimester. Because of this, sex might be more enjoyable. Try different positions and see whats best. Also, talk to your partner about any changes in desire. Spotting may happen after sex. Be sure to let your healthcare provider know if there is heavy bleeding.  Keeping your environment safe  You can still clean house and use scented products. Just take some simple precautions:  · Wear gloves when using cleaning fluids.  · Open windows to let in fresh air. Use a fan if you paint.  · Avoid secondhand smoke.  · Dont breathe fumes from nail polish, hair spray, cleansers, or other chemicals.  Date Last Reviewed: 8/16/2015 © 2000-2017 [employer] [address] [place] [address]. All rights reserved. This information is not intended as a substitute for professional medical care. Always follow your healthcare professional's instructions.        Pregnancy: Your Second Trimester Changes    Each day, you and your baby are changing and growing together. Heres a quick look at whats happening to both of you.  How You Are Changing  Even when you dont notice it, your body is adapting to meet the needs of your growing baby. The changes in your body might also affect your moods.  Your body  Your uterus expands as baby grows. As the weeks go by, you will feel more pressure on your bladder, stomach, and other organs. You may notice some skin color changes on your forehead, nose, or cheeks. Freckles may darken, and moles may grow. You may notice a darker line on your abdomen between your belly button and pubic bone in the midline.  Your moods  The second trimester is often easier than the first. Still, be prepared for mood swings. These are due to the increase in hormones (chemicals that affect the way organs work) produced by  your body. These mood swings are a normal part of pregnancy.  How your baby is growing       Month 4  Babys heartbeat may be heard with a Doppler (hand-held ultrasound device) by 9 to 10 weeks. Eyebrows, eyelashes and fingernails begin to form.  Month 5  You may feel your baby move. After a growth spurt, your baby nears 10 inches. Month 6  Babys fingerprints have formed. Your baby weighs about 1  to 2 pounds and is about 12 inches long.   Date Last Reviewed: 8/16/2015  © 2328-9859 [employer] [address] [place] [address], [place] [address]. All rights reserved. This information is not intended as a substitute for professional medical care. Always follow your healthcare professional's instructions.

## 2017-10-12 ENCOUNTER — PROCEDURE VISIT (OUTPATIENT)
Dept: OBSTETRICS AND GYNECOLOGY | Facility: CLINIC | Age: 27
End: 2017-10-12
Payer: MEDICAID

## 2017-10-12 DIAGNOSIS — O40.2XX1 POLYHYDRAMNIOS IN SECOND TRIMESTER, FETUS 1 OF MULTIPLE GESTATION: ICD-10-CM

## 2017-10-12 DIAGNOSIS — O40.2XX1 POLYHYDRAMNIOS IN SECOND TRIMESTER, FETUS 1 OF MULTIPLE GESTATION: Primary | ICD-10-CM

## 2017-10-12 PROCEDURE — 76819 FETAL BIOPHYS PROFIL W/O NST: CPT | Mod: 26,S$PBB,, | Performed by: OBSTETRICS & GYNECOLOGY

## 2017-10-12 PROCEDURE — 76816 OB US FOLLOW-UP PER FETUS: CPT | Mod: PBBFAC,PO | Performed by: OBSTETRICS & GYNECOLOGY

## 2017-10-12 PROCEDURE — 76819 FETAL BIOPHYS PROFIL W/O NST: CPT | Mod: PBBFAC,PO | Performed by: OBSTETRICS & GYNECOLOGY

## 2017-10-12 PROCEDURE — 76816 OB US FOLLOW-UP PER FETUS: CPT | Mod: 26,S$PBB,, | Performed by: OBSTETRICS & GYNECOLOGY

## 2017-10-12 NOTE — PROGRESS NOTES
Pt here for US and no arrhythmia present. However polyhydramnios remains, MVP 9.3, TIERA 29.6  She is feeling well except for occasional SOB  Discussed with Dr. No, will monitor every 2-3 weeks.

## 2017-11-03 ENCOUNTER — ROUTINE PRENATAL (OUTPATIENT)
Dept: OBSTETRICS AND GYNECOLOGY | Facility: CLINIC | Age: 27
End: 2017-11-03
Payer: MEDICAID

## 2017-11-03 ENCOUNTER — PROCEDURE VISIT (OUTPATIENT)
Dept: OBSTETRICS AND GYNECOLOGY | Facility: CLINIC | Age: 27
End: 2017-11-03
Payer: MEDICAID

## 2017-11-03 VITALS
WEIGHT: 198.44 LBS | SYSTOLIC BLOOD PRESSURE: 124 MMHG | DIASTOLIC BLOOD PRESSURE: 64 MMHG | BODY MASS INDEX: 36.29 KG/M2

## 2017-11-03 DIAGNOSIS — O98.511 HERPES SIMPLEX VIRUS TYPE 2 (HSV-2) INFECTION AFFECTING PREGNANCY IN FIRST TRIMESTER, ANTEPARTUM: ICD-10-CM

## 2017-11-03 DIAGNOSIS — O36.8390 FETAL ARRHYTHMIA AFFECTING PREGNANCY, ANTEPARTUM: ICD-10-CM

## 2017-11-03 DIAGNOSIS — O40.3XX1 POLYHYDRAMNIOS IN THIRD TRIMESTER COMPLICATION, FETUS 1 OF MULTIPLE GESTATION: Primary | ICD-10-CM

## 2017-11-03 DIAGNOSIS — O40.9XX0 POLYHYDRAMNIOS AFFECTING PREGNANCY: ICD-10-CM

## 2017-11-03 DIAGNOSIS — B00.9 HERPES SIMPLEX VIRUS TYPE 2 (HSV-2) INFECTION AFFECTING PREGNANCY IN FIRST TRIMESTER, ANTEPARTUM: ICD-10-CM

## 2017-11-03 PROCEDURE — 76819 FETAL BIOPHYS PROFIL W/O NST: CPT | Mod: 26,S$PBB,, | Performed by: OBSTETRICS & GYNECOLOGY

## 2017-11-03 PROCEDURE — 99211 OFF/OP EST MAY X REQ PHY/QHP: CPT | Mod: PBBFAC,PO | Performed by: ADVANCED PRACTICE MIDWIFE

## 2017-11-03 PROCEDURE — 99999 PR PBB SHADOW E&M-EST. PATIENT-LVL I: CPT | Mod: PBBFAC,,, | Performed by: ADVANCED PRACTICE MIDWIFE

## 2017-11-03 PROCEDURE — 76819 FETAL BIOPHYS PROFIL W/O NST: CPT | Mod: PBBFAC,PO | Performed by: OBSTETRICS & GYNECOLOGY

## 2017-11-03 PROCEDURE — 99213 OFFICE O/P EST LOW 20 MIN: CPT | Mod: TH,S$PBB,, | Performed by: ADVANCED PRACTICE MIDWIFE

## 2017-11-03 NOTE — PROGRESS NOTES
US- posterior placenta, MVP-10.5, TIERA-20.0cm BPP 8/8- Decreasing polyhydramnios  Tdap given last visit  Declines flu shot  Desires C/S and BTL needs consents- Medicaid consent signed today    Coffective counseling sheet Protect Breastfeeding discussed with mother. Reinforced avoidence of artifical nipples and formula, unless medically indicated.  Encouraged mother to download Coffective mobile andreia if she has not already done so. Mother verbalizes understanding.

## 2017-11-14 ENCOUNTER — PATIENT MESSAGE (OUTPATIENT)
Dept: OBSTETRICS AND GYNECOLOGY | Facility: CLINIC | Age: 27
End: 2017-11-14

## 2017-11-16 ENCOUNTER — ROUTINE PRENATAL (OUTPATIENT)
Dept: OBSTETRICS AND GYNECOLOGY | Facility: CLINIC | Age: 27
End: 2017-11-16
Payer: MEDICAID

## 2017-11-16 ENCOUNTER — PROCEDURE VISIT (OUTPATIENT)
Dept: OBSTETRICS AND GYNECOLOGY | Facility: CLINIC | Age: 27
End: 2017-11-16
Payer: MEDICAID

## 2017-11-16 VITALS
DIASTOLIC BLOOD PRESSURE: 74 MMHG | BODY MASS INDEX: 37.14 KG/M2 | WEIGHT: 203.06 LBS | SYSTOLIC BLOOD PRESSURE: 122 MMHG

## 2017-11-16 DIAGNOSIS — O99.210 OBESITY COMPLICATING PREGNANCY, CHILDBIRTH, OR PUERPERIUM, ANTEPARTUM: ICD-10-CM

## 2017-11-16 DIAGNOSIS — O40.9XX0 POLYHYDRAMNIOS AFFECTING PREGNANCY: ICD-10-CM

## 2017-11-16 DIAGNOSIS — Z3A.32 PREGNANCY WITH 32 COMPLETED WEEKS GESTATION: ICD-10-CM

## 2017-11-16 DIAGNOSIS — B00.9 HERPES SIMPLEX VIRUS TYPE 2 (HSV-2) INFECTION AFFECTING PREGNANCY IN FIRST TRIMESTER, ANTEPARTUM: ICD-10-CM

## 2017-11-16 DIAGNOSIS — K08.89 TOOTH ACHE: ICD-10-CM

## 2017-11-16 DIAGNOSIS — O98.511 HERPES SIMPLEX VIRUS TYPE 2 (HSV-2) INFECTION AFFECTING PREGNANCY IN FIRST TRIMESTER, ANTEPARTUM: ICD-10-CM

## 2017-11-16 DIAGNOSIS — Z98.891 H/O: C-SECTION: Primary | ICD-10-CM

## 2017-11-16 PROCEDURE — 99212 OFFICE O/P EST SF 10 MIN: CPT | Mod: TH,S$PBB,, | Performed by: OBSTETRICS & GYNECOLOGY

## 2017-11-16 PROCEDURE — 76819 FETAL BIOPHYS PROFIL W/O NST: CPT | Mod: PBBFAC | Performed by: OBSTETRICS & GYNECOLOGY

## 2017-11-16 PROCEDURE — 76816 OB US FOLLOW-UP PER FETUS: CPT | Mod: PBBFAC | Performed by: OBSTETRICS & GYNECOLOGY

## 2017-11-16 PROCEDURE — 99212 OFFICE O/P EST SF 10 MIN: CPT | Mod: PBBFAC | Performed by: OBSTETRICS & GYNECOLOGY

## 2017-11-16 PROCEDURE — 76819 FETAL BIOPHYS PROFIL W/O NST: CPT | Mod: 26,S$PBB,, | Performed by: OBSTETRICS & GYNECOLOGY

## 2017-11-16 PROCEDURE — 76816 OB US FOLLOW-UP PER FETUS: CPT | Mod: 26,S$PBB,, | Performed by: OBSTETRICS & GYNECOLOGY

## 2017-11-16 PROCEDURE — 99999 PR PBB SHADOW E&M-EST. PATIENT-LVL II: CPT | Mod: PBBFAC,,, | Performed by: OBSTETRICS & GYNECOLOGY

## 2017-11-16 RX ORDER — OXYCODONE AND ACETAMINOPHEN 5; 325 MG/1; MG/1
1 TABLET ORAL
Qty: 10 TABLET | Refills: 0 | Status: ON HOLD | OUTPATIENT
Start: 2017-11-16 | End: 2017-12-20 | Stop reason: HOSPADM

## 2017-11-16 NOTE — TELEPHONE ENCOUNTER
I have already told the patient that the Dentist office needs to give her something else for pain. Please advise.

## 2017-11-17 ENCOUNTER — TELEPHONE (OUTPATIENT)
Dept: OBSTETRICS AND GYNECOLOGY | Facility: CLINIC | Age: 27
End: 2017-11-17

## 2017-11-17 NOTE — TELEPHONE ENCOUNTER
Pt called clinic back, informed her of /BTL surgery time and date. Also informed NPO after midnight the night before. Pt verbalized understanding.

## 2017-11-17 NOTE — PROGRESS NOTES
Pt had tooth pulled last week, tylenol not helping with pain. Percocet 5/325 escripted w/ no further refills. U/S BPP 8/8, 5lb 14oz, (64%ile), vertex. Mild polyhydramnios. Desires repeat cs & confirms BTL. Will sign consents next visit  *dates confirmed w/ STANTON 1/6/18, will schedule cs 1/2/18 @ 9am

## 2017-11-17 NOTE — TELEPHONE ENCOUNTER
----- Message from Kate Wiggins MD sent at 11/16/2017  6:48 PM CST -----  Notify pt of repeat cs BTL date as 1/2/18 @ 9am. Pt will need to be there for 7am NPO after MN. Will need consents signed next visit

## 2017-11-27 ENCOUNTER — PATIENT MESSAGE (OUTPATIENT)
Dept: OBSTETRICS AND GYNECOLOGY | Facility: CLINIC | Age: 27
End: 2017-11-27

## 2017-11-28 ENCOUNTER — LAB VISIT (OUTPATIENT)
Dept: LAB | Facility: HOSPITAL | Age: 27
End: 2017-11-28
Attending: ADVANCED PRACTICE MIDWIFE
Payer: MEDICAID

## 2017-11-28 ENCOUNTER — PROCEDURE VISIT (OUTPATIENT)
Dept: OBSTETRICS AND GYNECOLOGY | Facility: CLINIC | Age: 27
End: 2017-11-28
Payer: MEDICAID

## 2017-11-28 ENCOUNTER — ROUTINE PRENATAL (OUTPATIENT)
Dept: OBSTETRICS AND GYNECOLOGY | Facility: CLINIC | Age: 27
End: 2017-11-28
Payer: MEDICAID

## 2017-11-28 VITALS
SYSTOLIC BLOOD PRESSURE: 116 MMHG | DIASTOLIC BLOOD PRESSURE: 62 MMHG | BODY MASS INDEX: 36.53 KG/M2 | WEIGHT: 199.75 LBS

## 2017-11-28 DIAGNOSIS — B00.9 HERPES SIMPLEX VIRUS TYPE 2 (HSV-2) INFECTION AFFECTING PREGNANCY IN FIRST TRIMESTER, ANTEPARTUM: ICD-10-CM

## 2017-11-28 DIAGNOSIS — O98.511 HERPES SIMPLEX VIRUS TYPE 2 (HSV-2) INFECTION AFFECTING PREGNANCY IN FIRST TRIMESTER, ANTEPARTUM: ICD-10-CM

## 2017-11-28 DIAGNOSIS — L29.9 PRURITIC DISORDER: Primary | ICD-10-CM

## 2017-11-28 DIAGNOSIS — Z98.891 HISTORY OF CESAREAN SECTION: ICD-10-CM

## 2017-11-28 DIAGNOSIS — L29.9 PRURITIC DISORDER: ICD-10-CM

## 2017-11-28 DIAGNOSIS — O40.9XX0 POLYHYDRAMNIOS AFFECTING PREGNANCY: ICD-10-CM

## 2017-11-28 PROCEDURE — 99213 OFFICE O/P EST LOW 20 MIN: CPT | Mod: TH,S$PBB,, | Performed by: ADVANCED PRACTICE MIDWIFE

## 2017-11-28 PROCEDURE — 99212 OFFICE O/P EST SF 10 MIN: CPT | Mod: PBBFAC,PO | Performed by: ADVANCED PRACTICE MIDWIFE

## 2017-11-28 PROCEDURE — 76819 FETAL BIOPHYS PROFIL W/O NST: CPT | Mod: PBBFAC,PO | Performed by: OBSTETRICS & GYNECOLOGY

## 2017-11-28 PROCEDURE — 80053 COMPREHEN METABOLIC PANEL: CPT

## 2017-11-28 PROCEDURE — 76819 FETAL BIOPHYS PROFIL W/O NST: CPT | Mod: 26,S$PBB,, | Performed by: OBSTETRICS & GYNECOLOGY

## 2017-11-28 PROCEDURE — 99999 PR PBB SHADOW E&M-EST. PATIENT-LVL II: CPT | Mod: PBBFAC,,, | Performed by: ADVANCED PRACTICE MIDWIFE

## 2017-11-28 PROCEDURE — 82239 BILE ACIDS TOTAL: CPT

## 2017-11-28 PROCEDURE — 36415 COLL VENOUS BLD VENIPUNCTURE: CPT | Mod: PO

## 2017-11-28 NOTE — PATIENT INSTRUCTIONS
Pregnancy and Childbirth: What to Bring to the Hospital    Youre likely feeling anxious as your childs birth approaches. This is normal. To give yourself some peace of mind, pack a bag ahead of time. Do this about 1 month ahead of your estimated delivery date. Here is a list of things to remember:  · Personal care items, like a toothbrush, hair brush, lip balm, lotion, and shampoo  · Eyeglasses (if you wear them)  · Nightgown (if you plan to breastfeed, pack 1 that allows for nursing)  · Nursing bra if you plan to breastfeed  · Bathrobe and slippers  · Many hospitals provide maternity underwear, but you may want to bring underwear that can be soiled because you will have bleeding after delivery  · Comfortable clothes for you to wear home, like sweat pants, yoga pants, or other stretchable clothes, because your prepregnancy clothes may not fit after delivery of your baby  · Clothes for your baby to wear home  · Personal music player and headphones  · Camera with new batteries or   · Coins for vending machines  · Telephone numbers of people to call after the birth  · Cell phone and   · Insurance information and any other paperwork needed for your hospital stay  · A list of baby names you are considering  · An infant, rear-facing car seat for bringing home your baby (this is required by law)  Add anything else that you dont want to forget:  _____________________________________  _____________________________________  _____________________________________  _____________________________________  _____________________________________  _____________________________________  _____________________________________  Date Last Reviewed: 8/7/2015  © 1635-4458 [employer]. [address] [place] [address]. All rights reserved. This information is not intended as a substitute for professional medical care. Always follow your healthcare professional's instructions.        Adapting to  Pregnancy: Third Trimester    Although common during pregnancy, some discomforts may seem worse in the final weeks. Simple lifestyle changes can help. Take care of yourself. And ask your partner to help out with small tasks.  Limiting leg problems  Ways to combat leg issues:  · Wear support hose all day.  · Avoid snug shoes and clothes that bind, like tight pants and socks with elastic tops.  · Sit with your feet and legs raised often.  Caring for your breasts  Tips to follow include:  · Wash with plain water. Avoid using harsh soaps or rubbing alcohol. They may cause dryness.  · Wear a nursing bra for extra support. It can also hide any leaks from your nipples.  Controlling hemorrhoids  Ways to avoid hemorrhoids include:  · Eat foods that are high in fiber. Also, exercise and drink enough fluids. This will reduce constipation and hemorrhoids.  · Sleep and nap on your side. This limits pressure on the veins of your rectum.  · Try not to stand or sit for long periods.  Controlling back pain  As your body changes during pregnancy, your back must work in new ways. Back pain is due to many causes. Physical changes in your body can strain your back and its supporting muscles. Also, hormones (chemicals that carry messages throughout the body) increase during pregnancy. This can affect how your muscles and joints work together. All of these changes can lead to pain. Pain may be felt in the upper or lower back. Pain is also common in the pelvis. Some pregnant women have sciatica. This is pain caused by pressure on the sciatic nerve running down the back of the leg. Ask your healthcare provider for specific tips and exercises to help control your back pain.  Tips to help you rest  Good rest and sleep will help you feel better. Here are some ideas:  · Ask your partner to massage your shoulders, neck, or back.  · Limit the errands you do each day.  · Lie down in the afternoon or after work for a few minutes.  · Take a warm  bath before you go to sleep.  · Drink warm milk or teas without caffeine.  · Avoid coffee, black tea, and cola.  Stopping heartburn  · Avoid spicy or acidic foods.  · Eat small amounts more often. Eat slowly. · Wait 2 hours after eating before lying down.  · Sleep with your upper body raised 6 inches.   Managing mood swings  Ways to manage mood swings include:  · Know that mood changes are normal.  · Exercise often, but get plenty of rest.  · Address any concerns and limit stress. Talking to your partner, other women, or your healthcare provider may help.  Dealing with urinary frequency  Tips to deal with having to urinate often include:  · Drink plenty of water all day. If you drink a lot in the evening, though, you may have to get up more in the night.  · Limit coffee, black tea, and cola.  Date Last Reviewed: 8/16/2015  © 7854-2943 [employer]. [address] [place] [address]. All rights reserved. This information is not intended as a substitute for professional medical care. Always follow your healthcare professional's instructions.        Pregnancy: Your Third Trimester Changes  As the baby grows, your body changes too. You may also see signs that your body is getting ready for labor. Be patient. Within a few more weeks, your baby will be born.    How you are changing  Your body is preparing for the birth of your baby. Some of the most common changes are listed below. If you have any questions or concerns, ask your healthcare provider:  · Youll gain more weight from fluids, extra blood, and fat deposits.  · Your breasts will grow as your body gets ready to feed the baby. They may be more tender. You may also notice a slight yellow or white discharge from the nipples.  · Discharge from your vagina may increase. This is normal.  · You might see some skin color changes on your forehead, cheeks, or nose. Most of these will go away after you deliver.  How your baby is growing    Month  7  Your baby can open and close his or her eyes, and weighs around 4 pounds. If born prematurely (too early), your baby would likely survive with special care.   Month 8  Your baby is building up body fat, and weighs around 6 pounds.    Month 9  Your baby weighs nearly 7 pounds and is about 18 to 20 inches long. In other words, any day now...   Date Last Reviewed: 8/16/2015 © 2000-2017 [employer]. [address] [place] [address], [place] [address]. All rights reserved. This information is not intended as a substitute for professional medical care. Always follow your healthcare professional's instructions.

## 2017-11-28 NOTE — PROGRESS NOTES
"Repeat C/S and BTL scheduled 1/2/18 at 0900hrs-Medicaid  BTL consents signed 11/3/17. Needs C/S consent- signed   BPP: 8/8. Cardiac and fetal movement present, 155bpms, cephalic presentation, posterior placenta  Needs weekly US for rest of pregnancy   Pruritis started a few weeks ago "belly and breasts" now "all over" during November and getting worse. Recent Oxycodone use secondary to dental work. No anti-histamines, Aveeno bath  O/E- no rash. Negative RUQ discomfort- Advise Benadryl, Aveeno, Calamine. Will order LFT's and bile salts and FU accordingly. Daily kick counts. Labor precautions    Coffective counseling sheet Build Your Team discussed with mother. Reinforced importance of early identification of support team including champion, OB provider, pediatrician and local community resources. Encouraged mother to download [employer] mobile andreia if she has not already done so.  Mother verbalizes understanding.  "

## 2017-11-29 LAB
ALBUMIN SERPL BCP-MCNC: 2.7 G/DL
ALP SERPL-CCNC: 153 U/L
ALT SERPL W/O P-5'-P-CCNC: 16 U/L
ANION GAP SERPL CALC-SCNC: 10 MMOL/L
AST SERPL-CCNC: 21 U/L
BILIRUB SERPL-MCNC: 0.6 MG/DL
BUN SERPL-MCNC: 9 MG/DL
CALCIUM SERPL-MCNC: 8.8 MG/DL
CHLORIDE SERPL-SCNC: 104 MMOL/L
CO2 SERPL-SCNC: 22 MMOL/L
CREAT SERPL-MCNC: 0.6 MG/DL
EST. GFR  (AFRICAN AMERICAN): >60 ML/MIN/1.73 M^2
EST. GFR  (NON AFRICAN AMERICAN): >60 ML/MIN/1.73 M^2
GLUCOSE SERPL-MCNC: 64 MG/DL
POTASSIUM SERPL-SCNC: 4.4 MMOL/L
PROT SERPL-MCNC: 7.4 G/DL
SODIUM SERPL-SCNC: 136 MMOL/L

## 2017-11-30 LAB — BILE AC SERPL-SCNC: 6 MCMOL/L

## 2017-12-07 ENCOUNTER — PROCEDURE VISIT (OUTPATIENT)
Dept: OBSTETRICS AND GYNECOLOGY | Facility: CLINIC | Age: 27
End: 2017-12-07
Payer: MEDICAID

## 2017-12-07 ENCOUNTER — ROUTINE PRENATAL (OUTPATIENT)
Dept: OBSTETRICS AND GYNECOLOGY | Facility: CLINIC | Age: 27
End: 2017-12-07
Payer: MEDICAID

## 2017-12-07 VITALS
BODY MASS INDEX: 37.02 KG/M2 | WEIGHT: 202.38 LBS | SYSTOLIC BLOOD PRESSURE: 126 MMHG | DIASTOLIC BLOOD PRESSURE: 68 MMHG

## 2017-12-07 DIAGNOSIS — B00.9 HERPES SIMPLEX VIRUS TYPE 2 (HSV-2) INFECTION AFFECTING PREGNANCY IN FIRST TRIMESTER, ANTEPARTUM: ICD-10-CM

## 2017-12-07 DIAGNOSIS — O98.511 HERPES SIMPLEX VIRUS TYPE 2 (HSV-2) INFECTION AFFECTING PREGNANCY IN FIRST TRIMESTER, ANTEPARTUM: ICD-10-CM

## 2017-12-07 DIAGNOSIS — Z98.891 HISTORY OF CESAREAN SECTION: Primary | ICD-10-CM

## 2017-12-07 DIAGNOSIS — O40.9XX0 POLYHYDRAMNIOS AFFECTING PREGNANCY: ICD-10-CM

## 2017-12-07 PROCEDURE — 76819 FETAL BIOPHYS PROFIL W/O NST: CPT | Mod: PBBFAC,PO | Performed by: OBSTETRICS & GYNECOLOGY

## 2017-12-07 PROCEDURE — 99999 PR PBB SHADOW E&M-EST. PATIENT-LVL II: CPT | Mod: PBBFAC,,, | Performed by: ADVANCED PRACTICE MIDWIFE

## 2017-12-07 PROCEDURE — 87081 CULTURE SCREEN ONLY: CPT

## 2017-12-07 PROCEDURE — 99212 OFFICE O/P EST SF 10 MIN: CPT | Mod: PBBFAC,PO | Performed by: ADVANCED PRACTICE MIDWIFE

## 2017-12-07 PROCEDURE — 76819 FETAL BIOPHYS PROFIL W/O NST: CPT | Mod: 26,S$PBB,, | Performed by: OBSTETRICS & GYNECOLOGY

## 2017-12-07 PROCEDURE — 99213 OFFICE O/P EST LOW 20 MIN: CPT | Mod: TH,S$PBB,, | Performed by: ADVANCED PRACTICE MIDWIFE

## 2017-12-07 NOTE — PROGRESS NOTES
LFT's and bile salts normal- itching improved  GBS today  US- BPP 8/8, MVP 9.5, TIERA 21.3 VTX  Has appt with Dr PARIS Maldonado as OB/GYN performing surgery

## 2017-12-11 LAB — BACTERIA SPEC AEROBE CULT: NORMAL

## 2017-12-14 ENCOUNTER — PROCEDURE VISIT (OUTPATIENT)
Dept: OBSTETRICS AND GYNECOLOGY | Facility: CLINIC | Age: 27
End: 2017-12-14
Payer: MEDICAID

## 2017-12-14 ENCOUNTER — ROUTINE PRENATAL (OUTPATIENT)
Dept: OBSTETRICS AND GYNECOLOGY | Facility: CLINIC | Age: 27
End: 2017-12-14
Payer: MEDICAID

## 2017-12-14 VITALS — DIASTOLIC BLOOD PRESSURE: 64 MMHG | BODY MASS INDEX: 37.9 KG/M2 | SYSTOLIC BLOOD PRESSURE: 122 MMHG | WEIGHT: 207.25 LBS

## 2017-12-14 DIAGNOSIS — Z98.891 HISTORY OF CESAREAN SECTION: Primary | ICD-10-CM

## 2017-12-14 DIAGNOSIS — O40.9XX0 POLYHYDRAMNIOS AFFECTING PREGNANCY: ICD-10-CM

## 2017-12-14 DIAGNOSIS — A60.9 HSV (HERPES SIMPLEX VIRUS) ANOGENITAL INFECTION: ICD-10-CM

## 2017-12-14 DIAGNOSIS — O98.511 HERPES SIMPLEX VIRUS TYPE 2 (HSV-2) INFECTION AFFECTING PREGNANCY IN FIRST TRIMESTER, ANTEPARTUM: ICD-10-CM

## 2017-12-14 DIAGNOSIS — B00.9 HERPES SIMPLEX VIRUS TYPE 2 (HSV-2) INFECTION AFFECTING PREGNANCY IN FIRST TRIMESTER, ANTEPARTUM: ICD-10-CM

## 2017-12-14 PROCEDURE — 99211 OFF/OP EST MAY X REQ PHY/QHP: CPT | Mod: PBBFAC,PO,25 | Performed by: ADVANCED PRACTICE MIDWIFE

## 2017-12-14 PROCEDURE — 76819 FETAL BIOPHYS PROFIL W/O NST: CPT | Mod: PBBFAC,PO | Performed by: OBSTETRICS & GYNECOLOGY

## 2017-12-14 PROCEDURE — 99213 OFFICE O/P EST LOW 20 MIN: CPT | Mod: TH,S$PBB,, | Performed by: ADVANCED PRACTICE MIDWIFE

## 2017-12-14 PROCEDURE — 76816 OB US FOLLOW-UP PER FETUS: CPT | Mod: 26,S$PBB,, | Performed by: OBSTETRICS & GYNECOLOGY

## 2017-12-14 PROCEDURE — 76819 FETAL BIOPHYS PROFIL W/O NST: CPT | Mod: 26,S$PBB,, | Performed by: OBSTETRICS & GYNECOLOGY

## 2017-12-14 PROCEDURE — 76816 OB US FOLLOW-UP PER FETUS: CPT | Mod: PBBFAC,PO | Performed by: OBSTETRICS & GYNECOLOGY

## 2017-12-14 PROCEDURE — 99999 PR PBB SHADOW E&M-EST. PATIENT-LVL I: CPT | Mod: PBBFAC,,, | Performed by: ADVANCED PRACTICE MIDWIFE

## 2017-12-14 RX ORDER — VALACYCLOVIR HYDROCHLORIDE 500 MG/1
500 TABLET, FILM COATED ORAL DAILY
Qty: 30 TABLET | Refills: 1 | Status: SHIPPED | OUTPATIENT
Start: 2017-12-14 | End: 2019-02-01

## 2017-12-14 NOTE — PROGRESS NOTES
US- Vtx, Posterior placenta, MVP- 9.2, TIERA 15.3. EFW 7.14ozs, BPP 8/8  Declines VE   Rpt C/S scheduled 1/2/18

## 2017-12-20 ENCOUNTER — HOSPITAL ENCOUNTER (OUTPATIENT)
Facility: HOSPITAL | Age: 27
LOS: 1 days | Discharge: HOME | End: 2017-12-20
Attending: OBSTETRICS & GYNECOLOGY | Admitting: OBSTETRICS & GYNECOLOGY
Payer: MEDICAID

## 2017-12-20 VITALS
RESPIRATION RATE: 18 BRPM | HEART RATE: 89 BPM | DIASTOLIC BLOOD PRESSURE: 77 MMHG | SYSTOLIC BLOOD PRESSURE: 112 MMHG | TEMPERATURE: 98 F

## 2017-12-20 DIAGNOSIS — O47.9 IRREGULAR UTERINE CONTRACTIONS: ICD-10-CM

## 2017-12-20 LAB
BILIRUB UR QL STRIP: NEGATIVE
CLARITY UR: CLEAR
COLOR UR: YELLOW
GLUCOSE UR QL STRIP: NEGATIVE
HGB UR QL STRIP: NEGATIVE
KETONES UR QL STRIP: ABNORMAL
LEUKOCYTE ESTERASE UR QL STRIP: NEGATIVE
NITRITE UR QL STRIP: NEGATIVE
PH UR STRIP: 8 [PH] (ref 5–8)
PROT UR QL STRIP: NEGATIVE
SP GR UR STRIP: 1.02 (ref 1–1.03)
URN SPEC COLLECT METH UR: ABNORMAL
UROBILINOGEN UR STRIP-ACNC: NEGATIVE EU/DL

## 2017-12-20 PROCEDURE — 99213 OFFICE O/P EST LOW 20 MIN: CPT | Mod: 25,TH,S$PBB, | Performed by: ADVANCED PRACTICE MIDWIFE

## 2017-12-20 PROCEDURE — 59025 FETAL NON-STRESS TEST: CPT | Mod: 26,,, | Performed by: ADVANCED PRACTICE MIDWIFE

## 2017-12-20 PROCEDURE — 81003 URINALYSIS AUTO W/O SCOPE: CPT

## 2017-12-20 PROCEDURE — 99211 OFF/OP EST MAY X REQ PHY/QHP: CPT

## 2017-12-20 PROCEDURE — 59025 FETAL NON-STRESS TEST: CPT

## 2017-12-20 RX ORDER — ACETAMINOPHEN 500 MG
500 TABLET ORAL EVERY 6 HOURS PRN
Status: DISCONTINUED | OUTPATIENT
Start: 2017-12-20 | End: 2017-12-21 | Stop reason: HOSPADM

## 2017-12-20 RX ORDER — ONDANSETRON 8 MG/1
8 TABLET, ORALLY DISINTEGRATING ORAL EVERY 8 HOURS PRN
Status: DISCONTINUED | OUTPATIENT
Start: 2017-12-20 | End: 2017-12-21 | Stop reason: HOSPADM

## 2017-12-21 ENCOUNTER — ROUTINE PRENATAL (OUTPATIENT)
Dept: OBSTETRICS AND GYNECOLOGY | Facility: CLINIC | Age: 27
End: 2017-12-21
Payer: MEDICAID

## 2017-12-21 ENCOUNTER — PROCEDURE VISIT (OUTPATIENT)
Dept: OBSTETRICS AND GYNECOLOGY | Facility: CLINIC | Age: 27
End: 2017-12-21
Payer: MEDICAID

## 2017-12-21 VITALS
BODY MASS INDEX: 37.66 KG/M2 | SYSTOLIC BLOOD PRESSURE: 112 MMHG | DIASTOLIC BLOOD PRESSURE: 70 MMHG | WEIGHT: 205.94 LBS

## 2017-12-21 DIAGNOSIS — O98.511 HERPES SIMPLEX VIRUS TYPE 2 (HSV-2) INFECTION AFFECTING PREGNANCY IN FIRST TRIMESTER, ANTEPARTUM: ICD-10-CM

## 2017-12-21 DIAGNOSIS — O40.9XX0 POLYHYDRAMNIOS AFFECTING PREGNANCY: ICD-10-CM

## 2017-12-21 DIAGNOSIS — B00.9 HERPES SIMPLEX VIRUS TYPE 2 (HSV-2) INFECTION AFFECTING PREGNANCY IN FIRST TRIMESTER, ANTEPARTUM: ICD-10-CM

## 2017-12-21 DIAGNOSIS — Z98.891 HISTORY OF CESAREAN SECTION: Primary | ICD-10-CM

## 2017-12-21 PROCEDURE — 99999 PR PBB SHADOW E&M-EST. PATIENT-LVL II: CPT | Mod: PBBFAC,,, | Performed by: ADVANCED PRACTICE MIDWIFE

## 2017-12-21 PROCEDURE — 99213 OFFICE O/P EST LOW 20 MIN: CPT | Mod: TH,S$PBB,, | Performed by: ADVANCED PRACTICE MIDWIFE

## 2017-12-21 PROCEDURE — 99212 OFFICE O/P EST SF 10 MIN: CPT | Mod: PBBFAC,PO | Performed by: ADVANCED PRACTICE MIDWIFE

## 2017-12-21 PROCEDURE — 76819 FETAL BIOPHYS PROFIL W/O NST: CPT | Mod: 26,S$PBB,, | Performed by: OBSTETRICS & GYNECOLOGY

## 2017-12-21 PROCEDURE — 76819 FETAL BIOPHYS PROFIL W/O NST: CPT | Mod: PBBFAC,PO | Performed by: OBSTETRICS & GYNECOLOGY

## 2017-12-21 RX ORDER — ACETAMINOPHEN 325 MG/1
325 TABLET ORAL EVERY 6 HOURS PRN
Status: ON HOLD | COMMUNITY
End: 2018-01-04 | Stop reason: HOSPADM

## 2017-12-21 NOTE — SUBJECTIVE & OBJECTIVE
Obstetric HPI:  Patient reports None and Frequency: 3-4 times per hour contractions, active fetal movement, No vaginal bleeding , No loss of fluid     This pregnancy has been complicated by HSV,prior c section, HX PIH,Polyhydramnios    Obstetric History       T2      L2     SAB0   TAB0   Ectopic0   Multiple0   Live Births2       # Outcome Date GA Lbr Virgilio/2nd Weight Sex Delivery Anes PTL Lv   3 Current            2 Term 16 41w1d  3.94 kg (8 lb 11 oz) M CS-LTranv EPI N DANIEL      Apgar1:  9                Apgar5: 9   1 Term 11/23/10 40w0d  3.771 kg (8 lb 5 oz) M CS-Unspec   DANIEL      Complications: Preeclampsia        Past Medical History:   Diagnosis Date    Anemia     Herpes simplex virus (HSV) infection     Hx of migraine headaches      Past Surgical History:   Procedure Laterality Date     SECTION      x 2    DILATION AND CURETTAGE OF UTERUS         PTA Medications   Medication Sig    aspirin 81 MG Chew Take 81 mg by mouth once daily.    oxyCODONE-acetaminophen (PERCOCET) 5-325 mg per tablet Take 1 tablet by mouth every 6 to 8 hours as needed for Pain.    pantoprazole (PROTONIX) 20 MG tablet Take 1 tablet (20 mg total) by mouth once daily.    -iron-FA-om-3s-dha-epa 3.33 mg iron- 0.33 mg Chew Take 2 each by mouth once daily at 6am.    valACYclovir (VALTREX) 500 MG tablet Take 1 tablet (500 mg total) by mouth once daily.       Review of patient's allergies indicates:   Allergen Reactions    Morphine Itching and Other (See Comments)     Burning sensation        Family History     None        Social History Main Topics    Smoking status: Never Smoker    Smokeless tobacco: Never Used    Alcohol use No    Drug use: No    Sexual activity: Yes     Partners: Male     Review of Systems   Constitutional: Negative.    HENT: Negative.    Eyes: Negative.    Respiratory: Negative.    Cardiovascular: Negative.    Gastrointestinal: Negative.    Endocrine: Negative.    Genitourinary:  Negative.    Musculoskeletal: Negative.    Skin:  Negative.   Neurological: Negative.    Hematological: Negative.    Psychiatric/Behavioral: Negative.    Breast: negative.    All other systems reviewed and are negative.     Objective:     Vital Signs (Most Recent):  Temp: 98.1 °F (36.7 °C) (12/20/17 2000)  Pulse: 108 (12/20/17 2015)  Resp: 18 (12/20/17 2015)  BP: 116/74 (12/20/17 2015) Vital Signs (24h Range):  Temp:  [98.1 °F (36.7 °C)] 98.1 °F (36.7 °C)  Pulse:  [] 108  Resp:  [18] 18  BP: (116-125)/(67-74) 116/74        There is no height or weight on file to calculate BMI.    FHT: 150Cat 1 (reassuring)  TOCO:  Q 2-10 minutes    Physical Exam:   Constitutional: She is oriented to person, place, and time. She appears well-developed and well-nourished.     Eyes: Conjunctivae are normal. Pupils are equal, round, and reactive to light.    Neck: Normal range of motion.    Cardiovascular: Normal rate and regular rhythm.     Pulmonary/Chest: Breath sounds normal.        Abdominal: Soft.     Genitourinary: Vagina normal and uterus normal.           Musculoskeletal: Normal range of motion and moves all extremeties.       Neurological: She is alert and oriented to person, place, and time.    Skin: Skin is warm.    Psychiatric: She has a normal mood and affect. Her behavior is normal. Thought content normal.       Cervix:  Dilation:  0  Effacement:  0%  Station: -3  Presentation: Vertex     Significant Labs:  Lab Results   Component Value Date    GROUPTRH O POS 05/15/2017    HEPBSAG Negative 05/15/2017    STREPBCULT No Group B Streptococcus isolated 12/07/2017       I have personallly reviewed all pertinent lab results from the last 24 hours.

## 2017-12-21 NOTE — PROCEDURES
Hesham Palacios is a 27 y.o. female patient.    Temp: 98.1 °F (36.7 °C) (12/20/17 2000)  Pulse: 89 (12/20/17 2045)  Resp: 18 (12/20/17 2045)  BP: 113/66 (12/20/17 2045)       Obtain Fetal nonstress test (NST)  Date/Time: 12/20/2017 8:58 PM  Performed by: URSULA DUONG  Authorized by: URSULA DUONG     Nonstress Test:     Variability:  6-25 BPM    Decelerations:  None    Accelerations:  15 bpm    Acoustic Stimulator: No      Baseline:  125    Uterine Irritability: No      Contractions:  Irregular    Contraction Frequency:  6-10  Biophysical Profile:     Nonstress Test Interpretation: reactive      Overall Impression:  Reassuring  Post-procedure:     Patient tolerance:  Patient tolerated the procedure well with no immediate complications        Ursula Duong  12/20/2017

## 2017-12-21 NOTE — DISCHARGE SUMMARY
[facility]  Obstetrics  Discharge Summary      Patient Name: Hesham Palacios  MRN: [id number]  Admission Date: 2017  Hospital Length of Stay: 1 days  Discharge Date and Time:  2017 10:19 PM  Attending Physician: Georgina Pittman MD   Discharging Provider: Ursula Dominguez CNM  Primary Care Provider: Sol De La Cruz MD    HPI:  IUP at 37w4d Prior c section x 2    Procedure(s) (LRB):  DELIVERY- SECTION WITH TUBAL (N/A)     Hospital Course:   Here c/o lower abdominal pain        Final Active Diagnoses:    Diagnosis Date Noted POA    PRINCIPAL PROBLEM:  Irregular uterine contractions [O62.2] 2017 Yes      Problems Resolved During this Admission:    Diagnosis Date Noted Date Resolved POA            Feeding Method: bottle    Immunizations     None          This patient has no babies on file.  Pending Diagnostic Studies:     None          Discharged Condition: good    Disposition: Home or Self Care    Follow Up:  Follow-up Information     Follow up In 1 week.               Patient Instructions:     Diet general     Activity as tolerated       Medications:  Current Discharge Medication List      CONTINUE these medications which have NOT CHANGED    Details   aspirin 81 MG Chew Take 81 mg by mouth once daily.      pantoprazole (PROTONIX) 20 MG tablet Take 1 tablet (20 mg total) by mouth once daily.  Qty: 30 tablet, Refills: 3      -iron-FA-om-3s-dha-epa 3.33 mg iron- 0.33 mg Chew Take 2 each by mouth once daily at 6am.      valACYclovir (VALTREX) 500 MG tablet Take 1 tablet (500 mg total) by mouth once daily.  Qty: 30 tablet, Refills: 1    Associated Diagnoses: HSV (herpes simplex virus) anogenital infection         STOP taking these medications       oxyCODONE-acetaminophen (PERCOCET) 5-325 mg per tablet Comments:   Reason for Stopping:               Ursula Dominguez CNM  Obstetrics  [facility]

## 2017-12-21 NOTE — PROGRESS NOTES
L&D visit last evening to R/O labor- Reactive NST and kathy in labor  US BPP 8/8 with NST last night 10/10. MVP 7.9, TIERA 20.1   VE last night 0/0/-3

## 2017-12-21 NOTE — NURSING
"Received amb to Triage c/o "just hurting all over. Kind of in my back, and my stomach.  But I don't think its labor pains."  States fetal movement present.  C/o urinary frequency.  Denies vag bleeding/ ROM.  Placed on EFM, and assessment completed.  "

## 2017-12-21 NOTE — H&P
[facility]   Obstetrics  History & Physical    Patient Name: Hesham Palacios  MRN: [id number]  Admission Date: 2017  Primary Care Provider: Sol De La Cruz MD    Subjective:     Principal Problem:<principal problem not specified>    History of Present Illness:   IUP at 37w4d Prior c section x 2    Obstetric HPI:  Patient reports None and Frequency: 3-4 times per hour contractions, active fetal movement, No vaginal bleeding , No loss of fluid     This pregnancy has been complicated by HSV,prior c section, HX PIH,Polyhydramnios    Obstetric History       T2      L2     SAB0   TAB0   Ectopic0   Multiple0   Live Births2       # Outcome Date GA Lbr Virgilio/2nd Weight Sex Delivery Anes PTL Lv   3 Current            2 Term 16 41w1d  3.94 kg (8 lb 11 oz) M CS-LTranv EPI N DANIEL      Apgar1:  9                Apgar5: 9   1 Term 11/23/10 40w0d  3.771 kg (8 lb 5 oz) M CS-Unspec   DANIEL      Complications: Preeclampsia        Past Medical History:   Diagnosis Date    Anemia     Herpes simplex virus (HSV) infection     Hx of migraine headaches      Past Surgical History:   Procedure Laterality Date     SECTION      x 2    DILATION AND CURETTAGE OF UTERUS         PTA Medications   Medication Sig    aspirin 81 MG Chew Take 81 mg by mouth once daily.    oxyCODONE-acetaminophen (PERCOCET) 5-325 mg per tablet Take 1 tablet by mouth every 6 to 8 hours as needed for Pain.    pantoprazole (PROTONIX) 20 MG tablet Take 1 tablet (20 mg total) by mouth once daily.    -iron-FA-om-3s-dha-epa 3.33 mg iron- 0.33 mg Chew Take 2 each by mouth once daily at 6am.    valACYclovir (VALTREX) 500 MG tablet Take 1 tablet (500 mg total) by mouth once daily.       Review of patient's allergies indicates:   Allergen Reactions    Morphine Itching and Other (See Comments)     Burning sensation        Family History     None        Social History Main Topics    Smoking status: Never Smoker     Smokeless tobacco: Never Used    Alcohol use No    Drug use: No    Sexual activity: Yes     Partners: Male     Review of Systems   Constitutional: Negative.    HENT: Negative.    Eyes: Negative.    Respiratory: Negative.    Cardiovascular: Negative.    Gastrointestinal: Negative.    Endocrine: Negative.    Genitourinary: Negative.    Musculoskeletal: Negative.    Skin:  Negative.   Neurological: Negative.    Hematological: Negative.    Psychiatric/Behavioral: Negative.    Breast: negative.    All other systems reviewed and are negative.     Objective:     Vital Signs (Most Recent):  Temp: 98.1 °F (36.7 °C) (12/20/17 2000)  Pulse: 108 (12/20/17 2015)  Resp: 18 (12/20/17 2015)  BP: 116/74 (12/20/17 2015) Vital Signs (24h Range):  Temp:  [98.1 °F (36.7 °C)] 98.1 °F (36.7 °C)  Pulse:  [] 108  Resp:  [18] 18  BP: (116-125)/(67-74) 116/74        There is no height or weight on file to calculate BMI.    FHT: 150Cat 1 (reassuring)  TOCO:  Q 2-10 minutes    Physical Exam:   Constitutional: She is oriented to person, place, and time. She appears well-developed and well-nourished.     Eyes: Conjunctivae are normal. Pupils are equal, round, and reactive to light.    Neck: Normal range of motion.    Cardiovascular: Normal rate and regular rhythm.     Pulmonary/Chest: Breath sounds normal.        Abdominal: Soft.     Genitourinary: Vagina normal and uterus normal.           Musculoskeletal: Normal range of motion and moves all extremeties.       Neurological: She is alert and oriented to person, place, and time.    Skin: Skin is warm.    Psychiatric: She has a normal mood and affect. Her behavior is normal. Thought content normal.       Cervix:  Dilation:  0  Effacement:  0%  Station: -3  Presentation: Vertex     Significant Labs:  Lab Results   Component Value Date    GROUPTRH O POS 05/15/2017    HEPBSAG Negative 05/15/2017    STREPBCULT No Group B Streptococcus isolated 12/07/2017       I have personallly reviewed  all pertinent lab results from the last 24 hours.    Assessment/Plan:     27 y.o. female  at 37w4d for:    Irregular uterine contractions    IUP at 37w4d  Pelvic pressure  Mild irregular contractions  P hydrate  U/a   observe            Ursula Dominguez CNM  Obstetrics  [facility]

## 2017-12-28 ENCOUNTER — PROCEDURE VISIT (OUTPATIENT)
Dept: OBSTETRICS AND GYNECOLOGY | Facility: CLINIC | Age: 27
End: 2017-12-28
Payer: MEDICAID

## 2017-12-28 ENCOUNTER — ROUTINE PRENATAL (OUTPATIENT)
Dept: OBSTETRICS AND GYNECOLOGY | Facility: CLINIC | Age: 27
End: 2017-12-28
Payer: MEDICAID

## 2017-12-28 VITALS — DIASTOLIC BLOOD PRESSURE: 68 MMHG | SYSTOLIC BLOOD PRESSURE: 116 MMHG | WEIGHT: 214.5 LBS | BODY MASS INDEX: 39.23 KG/M2

## 2017-12-28 DIAGNOSIS — Z3A.38 PREGNANCY WITH 38 COMPLETED WEEKS GESTATION: Primary | ICD-10-CM

## 2017-12-28 DIAGNOSIS — O99.713 PRURITUS OF PREGNANCY IN THIRD TRIMESTER: ICD-10-CM

## 2017-12-28 DIAGNOSIS — B00.9 HERPES SIMPLEX VIRUS TYPE 2 (HSV-2) INFECTION AFFECTING PREGNANCY IN FIRST TRIMESTER, ANTEPARTUM: ICD-10-CM

## 2017-12-28 DIAGNOSIS — O98.511 HERPES SIMPLEX VIRUS TYPE 2 (HSV-2) INFECTION AFFECTING PREGNANCY IN FIRST TRIMESTER, ANTEPARTUM: ICD-10-CM

## 2017-12-28 DIAGNOSIS — L29.9 PRURITUS OF PREGNANCY IN THIRD TRIMESTER: ICD-10-CM

## 2017-12-28 DIAGNOSIS — Z98.891 HISTORY OF CESAREAN SECTION: ICD-10-CM

## 2017-12-28 DIAGNOSIS — O40.9XX0 POLYHYDRAMNIOS AFFECTING PREGNANCY: ICD-10-CM

## 2017-12-28 DIAGNOSIS — O09.299 HISTORY OF PRE-ECLAMPSIA IN PRIOR PREGNANCY, CURRENTLY PREGNANT: ICD-10-CM

## 2017-12-28 PROBLEM — O36.8390 FETAL ARRHYTHMIA AFFECTING PREGNANCY, ANTEPARTUM: Status: RESOLVED | Noted: 2017-08-30 | Resolved: 2017-12-28

## 2017-12-28 PROCEDURE — 99212 OFFICE O/P EST SF 10 MIN: CPT | Mod: PBBFAC | Performed by: OBSTETRICS & GYNECOLOGY

## 2017-12-28 PROCEDURE — 76819 FETAL BIOPHYS PROFIL W/O NST: CPT | Mod: PBBFAC | Performed by: OBSTETRICS & GYNECOLOGY

## 2017-12-28 PROCEDURE — 99999 PR PBB SHADOW E&M-EST. PATIENT-LVL II: CPT | Mod: PBBFAC,,, | Performed by: OBSTETRICS & GYNECOLOGY

## 2017-12-28 PROCEDURE — 99212 OFFICE O/P EST SF 10 MIN: CPT | Mod: TH,S$PBB,25, | Performed by: OBSTETRICS & GYNECOLOGY

## 2017-12-28 PROCEDURE — 76819 FETAL BIOPHYS PROFIL W/O NST: CPT | Mod: 26,S$PBB,, | Performed by: OBSTETRICS & GYNECOLOGY

## 2017-12-28 RX ORDER — SODIUM CHLORIDE, SODIUM LACTATE, POTASSIUM CHLORIDE, CALCIUM CHLORIDE 600; 310; 30; 20 MG/100ML; MG/100ML; MG/100ML; MG/100ML
INJECTION, SOLUTION INTRAVENOUS CONTINUOUS
Status: CANCELLED | OUTPATIENT
Start: 2017-12-28

## 2017-12-28 RX ORDER — HYDROXYZINE PAMOATE 25 MG/1
25 CAPSULE ORAL EVERY 6 HOURS PRN
Qty: 30 CAPSULE | Refills: 0 | Status: SHIPPED | OUTPATIENT
Start: 2017-12-28 | End: 2019-02-01

## 2017-12-28 RX ORDER — MISOPROSTOL 100 UG/1
800 TABLET ORAL
Status: CANCELLED | OUTPATIENT
Start: 2017-12-28

## 2017-12-28 RX ORDER — SODIUM CITRATE AND CITRIC ACID MONOHYDRATE 334; 500 MG/5ML; MG/5ML
30 SOLUTION ORAL
Status: CANCELLED | OUTPATIENT
Start: 2017-12-28

## 2017-12-28 NOTE — PROGRESS NOTES
Pt crying today.  Feeling more depressed the last several days.  Has been having trouble sleeping b/c of her itching.  This was worked up by Wilbert Martinez, and serum bile acids were negative.  Rx for vistaril sent today.  No suicidal or homicidal ideation.  Pt declines any antidepressants at this time.  Gave precautions to come in if she has any thoughts of harming herself or others.  Will reevaluate postpartum in the hospital.  Sono today: BPP 8/8, MVP stable at 9.2cm with total TIERA 21.1cm  Still desires repeat  with BTL.  Pre-op instructions and hibiclens packets given.  Consents reviewed in detail and signed.  All questions answered.

## 2018-01-02 ENCOUNTER — SURGERY (OUTPATIENT)
Age: 28
End: 2018-01-02

## 2018-01-02 ENCOUNTER — ANESTHESIA (OUTPATIENT)
Dept: OBSTETRICS AND GYNECOLOGY | Facility: HOSPITAL | Age: 28
End: 2018-01-02
Payer: MEDICAID

## 2018-01-02 ENCOUNTER — ANESTHESIA EVENT (OUTPATIENT)
Dept: OBSTETRICS AND GYNECOLOGY | Facility: HOSPITAL | Age: 28
End: 2018-01-02
Payer: MEDICAID

## 2018-01-02 ENCOUNTER — HOSPITAL ENCOUNTER (INPATIENT)
Facility: HOSPITAL | Age: 28
LOS: 2 days | Discharge: HOME | End: 2018-01-04
Attending: OBSTETRICS & GYNECOLOGY | Admitting: OBSTETRICS & GYNECOLOGY
Payer: MEDICAID

## 2018-01-02 DIAGNOSIS — Z98.891 HISTORY OF CESAREAN SECTION: ICD-10-CM

## 2018-01-02 DIAGNOSIS — Z98.891 STATUS POST REPEAT LOW TRANSVERSE CESAREAN SECTION: Primary | ICD-10-CM

## 2018-01-02 DIAGNOSIS — Z3A.38 PREGNANCY WITH 38 COMPLETED WEEKS GESTATION: ICD-10-CM

## 2018-01-02 PROBLEM — O47.9 IRREGULAR UTERINE CONTRACTIONS: Status: RESOLVED | Noted: 2017-12-20 | Resolved: 2018-01-02

## 2018-01-02 PROBLEM — Z30.2 REQUEST FOR STERILIZATION: Status: ACTIVE | Noted: 2018-01-02

## 2018-01-02 LAB
ABO + RH BLD: NORMAL
ANISOCYTOSIS BLD QL SMEAR: SLIGHT
BASOPHILS # BLD AUTO: 0 K/UL
BASOPHILS NFR BLD: 0 %
BLD GP AB SCN CELLS X3 SERPL QL: NORMAL
DACRYOCYTES BLD QL SMEAR: ABNORMAL
DIFFERENTIAL METHOD: ABNORMAL
EOSINOPHIL # BLD AUTO: 0.1 K/UL
EOSINOPHIL NFR BLD: 2.3 %
ERYTHROCYTE [DISTWIDTH] IN BLOOD BY AUTOMATED COUNT: 12.7 %
HCT VFR BLD AUTO: 29.9 %
HGB BLD-MCNC: 10.7 G/DL
LYMPHOCYTES # BLD AUTO: 2.4 K/UL
LYMPHOCYTES NFR BLD: 49.7 %
MCH RBC QN AUTO: 37.5 PG
MCHC RBC AUTO-ENTMCNC: 35.8 G/DL
MCV RBC AUTO: 105 FL
MONOCYTES # BLD AUTO: 0.4 K/UL
MONOCYTES NFR BLD: 7.7 %
NEUTROPHILS # BLD AUTO: 1.9 K/UL
NEUTROPHILS NFR BLD: 40.3 %
PLATELET # BLD AUTO: 224 K/UL
PMV BLD AUTO: 9.9 FL
POIKILOCYTOSIS BLD QL SMEAR: SLIGHT
RBC # BLD AUTO: 2.85 M/UL
WBC # BLD AUTO: 5.31 K/UL

## 2018-01-02 PROCEDURE — 63600175 PHARM REV CODE 636 W HCPCS: Performed by: OBSTETRICS & GYNECOLOGY

## 2018-01-02 PROCEDURE — 25000003 PHARM REV CODE 250: Performed by: OBSTETRICS & GYNECOLOGY

## 2018-01-02 PROCEDURE — 0UB70ZZ EXCISION OF BILATERAL FALLOPIAN TUBES, OPEN APPROACH: ICD-10-PCS | Performed by: OBSTETRICS & GYNECOLOGY

## 2018-01-02 PROCEDURE — 86901 BLOOD TYPING SEROLOGIC RH(D): CPT

## 2018-01-02 PROCEDURE — 51702 INSERT TEMP BLADDER CATH: CPT

## 2018-01-02 PROCEDURE — 36000680 HC C/S TUBAL LIGATION LEVEL I

## 2018-01-02 PROCEDURE — 11000001 HC ACUTE MED/SURG PRIVATE ROOM

## 2018-01-02 PROCEDURE — 58611 LIGATE OVIDUCT(S) ADD-ON: CPT | Mod: ,,, | Performed by: OBSTETRICS & GYNECOLOGY

## 2018-01-02 PROCEDURE — 85025 COMPLETE CBC W/AUTO DIFF WBC: CPT

## 2018-01-02 PROCEDURE — 88302 TISSUE EXAM BY PATHOLOGIST: CPT | Performed by: PATHOLOGY

## 2018-01-02 PROCEDURE — 88302 TISSUE EXAM BY PATHOLOGIST: CPT | Mod: 26,,, | Performed by: PATHOLOGY

## 2018-01-02 PROCEDURE — 59514 CESAREAN DELIVERY ONLY: CPT | Mod: GB,,, | Performed by: OBSTETRICS & GYNECOLOGY

## 2018-01-02 PROCEDURE — 37000009 HC ANESTHESIA EA ADD 15 MINS: Performed by: OBSTETRICS & GYNECOLOGY

## 2018-01-02 PROCEDURE — 37000008 HC ANESTHESIA 1ST 15 MINUTES: Performed by: OBSTETRICS & GYNECOLOGY

## 2018-01-02 PROCEDURE — 63600175 PHARM REV CODE 636 W HCPCS: Performed by: NURSE ANESTHETIST, CERTIFIED REGISTERED

## 2018-01-02 RX ORDER — OXYTOCIN 10 [USP'U]/ML
INJECTION, SOLUTION INTRAMUSCULAR; INTRAVENOUS
Status: DISCONTINUED | OUTPATIENT
Start: 2018-01-02 | End: 2018-01-02

## 2018-01-02 RX ORDER — MISOPROSTOL 200 UG/1
800 TABLET ORAL
Status: DISCONTINUED | OUTPATIENT
Start: 2018-01-02 | End: 2018-01-02

## 2018-01-02 RX ORDER — HYDROMORPHONE HYDROCHLORIDE 1 MG/ML
1 INJECTION, SOLUTION INTRAMUSCULAR; INTRAVENOUS; SUBCUTANEOUS ONCE
Status: DISCONTINUED | OUTPATIENT
Start: 2018-01-02 | End: 2018-01-04 | Stop reason: HOSPADM

## 2018-01-02 RX ORDER — KETOROLAC TROMETHAMINE 30 MG/ML
INJECTION, SOLUTION INTRAMUSCULAR; INTRAVENOUS
Status: DISCONTINUED | OUTPATIENT
Start: 2018-01-02 | End: 2018-01-02

## 2018-01-02 RX ORDER — HYDROMORPHONE HYDROCHLORIDE 1 MG/ML
1 INJECTION, SOLUTION INTRAMUSCULAR; INTRAVENOUS; SUBCUTANEOUS EVERY 4 HOURS PRN
Status: DISCONTINUED | OUTPATIENT
Start: 2018-01-02 | End: 2018-01-04 | Stop reason: HOSPADM

## 2018-01-02 RX ORDER — OXYTOCIN/RINGER'S LACTATE 20/1000 ML
PLASTIC BAG, INJECTION (ML) INTRAVENOUS CONTINUOUS PRN
Status: DISCONTINUED | OUTPATIENT
Start: 2018-01-02 | End: 2018-01-02

## 2018-01-02 RX ORDER — DIPHENHYDRAMINE HYDROCHLORIDE 50 MG/ML
25 INJECTION INTRAMUSCULAR; INTRAVENOUS EVERY 6 HOURS PRN
Status: DISCONTINUED | OUTPATIENT
Start: 2018-01-02 | End: 2018-01-04 | Stop reason: HOSPADM

## 2018-01-02 RX ORDER — PHENYLEPHRINE HYDROCHLORIDE 10 MG/ML
INJECTION INTRAVENOUS
Status: DISCONTINUED | OUTPATIENT
Start: 2018-01-02 | End: 2018-01-02

## 2018-01-02 RX ORDER — OXYCODONE AND ACETAMINOPHEN 5; 325 MG/1; MG/1
1 TABLET ORAL EVERY 4 HOURS PRN
Status: DISCONTINUED | OUTPATIENT
Start: 2018-01-02 | End: 2018-01-04 | Stop reason: HOSPADM

## 2018-01-02 RX ORDER — ONDANSETRON 2 MG/ML
INJECTION INTRAMUSCULAR; INTRAVENOUS
Status: DISCONTINUED | OUTPATIENT
Start: 2018-01-02 | End: 2018-01-02

## 2018-01-02 RX ORDER — SODIUM CHLORIDE, SODIUM LACTATE, POTASSIUM CHLORIDE, CALCIUM CHLORIDE 600; 310; 30; 20 MG/100ML; MG/100ML; MG/100ML; MG/100ML
INJECTION, SOLUTION INTRAVENOUS CONTINUOUS
Status: DISCONTINUED | OUTPATIENT
Start: 2018-01-02 | End: 2018-01-04 | Stop reason: HOSPADM

## 2018-01-02 RX ORDER — KETOROLAC TROMETHAMINE 30 MG/ML
30 INJECTION, SOLUTION INTRAMUSCULAR; INTRAVENOUS EVERY 6 HOURS
Status: DISPENSED | OUTPATIENT
Start: 2018-01-02 | End: 2018-01-03

## 2018-01-02 RX ORDER — HYDROXYZINE PAMOATE 25 MG/1
25 CAPSULE ORAL EVERY 6 HOURS PRN
Status: DISCONTINUED | OUTPATIENT
Start: 2018-01-02 | End: 2018-01-04 | Stop reason: HOSPADM

## 2018-01-02 RX ORDER — OXYCODONE AND ACETAMINOPHEN 10; 325 MG/1; MG/1
1 TABLET ORAL EVERY 4 HOURS PRN
Status: DISCONTINUED | OUTPATIENT
Start: 2018-01-02 | End: 2018-01-04 | Stop reason: HOSPADM

## 2018-01-02 RX ORDER — SODIUM CITRATE AND CITRIC ACID MONOHYDRATE 334; 500 MG/5ML; MG/5ML
30 SOLUTION ORAL
Status: DISCONTINUED | OUTPATIENT
Start: 2018-01-02 | End: 2018-01-02

## 2018-01-02 RX ORDER — SODIUM CHLORIDE, SODIUM LACTATE, POTASSIUM CHLORIDE, CALCIUM CHLORIDE 600; 310; 30; 20 MG/100ML; MG/100ML; MG/100ML; MG/100ML
INJECTION, SOLUTION INTRAVENOUS CONTINUOUS
Status: DISCONTINUED | OUTPATIENT
Start: 2018-01-02 | End: 2018-01-02

## 2018-01-02 RX ORDER — IBUPROFEN 800 MG/1
800 TABLET ORAL EVERY 8 HOURS
Status: DISCONTINUED | OUTPATIENT
Start: 2018-01-03 | End: 2018-01-04 | Stop reason: HOSPADM

## 2018-01-02 RX ORDER — CEFAZOLIN SODIUM 2 G/50ML
2 SOLUTION INTRAVENOUS
Status: COMPLETED | OUTPATIENT
Start: 2018-01-02 | End: 2018-01-02

## 2018-01-02 RX ORDER — HYDROMORPHONE HYDROCHLORIDE 1 MG/ML
0.5 INJECTION, SOLUTION INTRAMUSCULAR; INTRAVENOUS; SUBCUTANEOUS ONCE
Status: DISCONTINUED | OUTPATIENT
Start: 2018-01-02 | End: 2018-01-02 | Stop reason: SDUPTHER

## 2018-01-02 RX ORDER — DOCUSATE SODIUM 100 MG/1
100 CAPSULE, LIQUID FILLED ORAL DAILY
Status: DISCONTINUED | OUTPATIENT
Start: 2018-01-02 | End: 2018-01-04 | Stop reason: HOSPADM

## 2018-01-02 RX ORDER — ACETAMINOPHEN 325 MG/1
650 TABLET ORAL EVERY 6 HOURS PRN
Status: DISCONTINUED | OUTPATIENT
Start: 2018-01-02 | End: 2018-01-04 | Stop reason: HOSPADM

## 2018-01-02 RX ORDER — ONDANSETRON 8 MG/1
8 TABLET, ORALLY DISINTEGRATING ORAL EVERY 8 HOURS PRN
Status: DISCONTINUED | OUTPATIENT
Start: 2018-01-02 | End: 2018-01-04 | Stop reason: HOSPADM

## 2018-01-02 RX ORDER — AMMONIA 15 % (W/V)
0.3 AMPUL (EA) INHALATION CONTINUOUS PRN
Status: DISCONTINUED | OUTPATIENT
Start: 2018-01-02 | End: 2018-01-04 | Stop reason: HOSPADM

## 2018-01-02 RX ORDER — OXYTOCIN/RINGER'S LACTATE 20/1000 ML
41.65 PLASTIC BAG, INJECTION (ML) INTRAVENOUS CONTINUOUS
Status: DISPENSED | OUTPATIENT
Start: 2018-01-02 | End: 2018-01-02

## 2018-01-02 RX ADMIN — PHENYLEPHRINE HYDROCHLORIDE 100 MCG: 10 INJECTION INTRAVENOUS at 07:01

## 2018-01-02 RX ADMIN — SODIUM CHLORIDE, SODIUM LACTATE, POTASSIUM CHLORIDE, AND CALCIUM CHLORIDE 1000 ML: 600; 310; 30; 20 INJECTION, SOLUTION INTRAVENOUS at 05:01

## 2018-01-02 RX ADMIN — ONDANSETRON 4 MG: 2 INJECTION, SOLUTION INTRAMUSCULAR; INTRAVENOUS at 08:01

## 2018-01-02 RX ADMIN — OXYCODONE HYDROCHLORIDE AND ACETAMINOPHEN 1 TABLET: 10; 325 TABLET ORAL at 07:01

## 2018-01-02 RX ADMIN — OXYCODONE HYDROCHLORIDE AND ACETAMINOPHEN 1 TABLET: 10; 325 TABLET ORAL at 11:01

## 2018-01-02 RX ADMIN — OXYTOCIN 20 UNITS: 10 INJECTION, SOLUTION INTRAMUSCULAR; INTRAVENOUS at 07:01

## 2018-01-02 RX ADMIN — HYDROMORPHONE HYDROCHLORIDE 1 MG: 1 INJECTION, SOLUTION INTRAMUSCULAR; INTRAVENOUS; SUBCUTANEOUS at 09:01

## 2018-01-02 RX ADMIN — KETOROLAC TROMETHAMINE 30 MG: 30 INJECTION, SOLUTION INTRAMUSCULAR at 02:01

## 2018-01-02 RX ADMIN — SODIUM CITRATE AND CITRIC ACID MONOHYDRATE 30 ML: 500; 334 SOLUTION ORAL at 06:01

## 2018-01-02 RX ADMIN — OXYCODONE HYDROCHLORIDE AND ACETAMINOPHEN 1 TABLET: 10; 325 TABLET ORAL at 03:01

## 2018-01-02 RX ADMIN — DIPHENHYDRAMINE HYDROCHLORIDE 25 MG: 50 INJECTION, SOLUTION INTRAMUSCULAR; INTRAVENOUS at 03:01

## 2018-01-02 RX ADMIN — Medication: at 08:01

## 2018-01-02 RX ADMIN — HYDROMORPHONE HYDROCHLORIDE 1 MG: 1 INJECTION, SOLUTION INTRAMUSCULAR; INTRAVENOUS; SUBCUTANEOUS at 08:01

## 2018-01-02 RX ADMIN — KETOROLAC TROMETHAMINE 30 MG: 30 INJECTION, SOLUTION INTRAMUSCULAR at 11:01

## 2018-01-02 RX ADMIN — HYDROMORPHONE HYDROCHLORIDE 1 MG: 1 INJECTION, SOLUTION INTRAMUSCULAR; INTRAVENOUS; SUBCUTANEOUS at 11:01

## 2018-01-02 RX ADMIN — CEFAZOLIN SODIUM 2 G: 2 SOLUTION INTRAVENOUS at 07:01

## 2018-01-02 RX ADMIN — Medication: at 07:01

## 2018-01-02 RX ADMIN — DIPHENHYDRAMINE HYDROCHLORIDE 25 MG: 50 INJECTION, SOLUTION INTRAMUSCULAR; INTRAVENOUS at 09:01

## 2018-01-02 RX ADMIN — PHENYLEPHRINE HYDROCHLORIDE 100 MCG: 10 INJECTION INTRAVENOUS at 08:01

## 2018-01-02 RX ADMIN — OXYCODONE HYDROCHLORIDE AND ACETAMINOPHEN 1 TABLET: 10; 325 TABLET ORAL at 10:01

## 2018-01-02 RX ADMIN — KETOROLAC TROMETHAMINE 30 MG: 30 INJECTION, SOLUTION INTRAMUSCULAR; INTRAVENOUS at 08:01

## 2018-01-02 RX ADMIN — SODIUM CHLORIDE, SODIUM LACTATE, POTASSIUM CHLORIDE, AND CALCIUM CHLORIDE 125 ML/HR: 600; 310; 30; 20 INJECTION, SOLUTION INTRAVENOUS at 06:01

## 2018-01-02 NOTE — ANESTHESIA PROCEDURE NOTES
Spinal    Patient location during procedure: OR  Start time: 1/2/2018 7:08 AM  Timeout: 1/2/2018 7:08 AM  End time: 1/2/2018 7:14 AM  Staffing  Anesthesiologist: ADRIA LENTZ  Performed: anesthesiologist   Preanesthetic Checklist  Completed: patient identified, site marked, pre-op evaluation, timeout performed, IV checked, risks and benefits discussed and monitors and equipment checked  Spinal Block  Patient position: sitting  Prep: Betadine  Patient monitoring: heart rate, cardiac monitor and continuous pulse ox  Approach: midline  Location: L4-5  Injection technique: single shot  CSF Fluid: clear free-flowing CSF  Needle  Needle type: Spinocan.   Needle gauge: 25 G  Needle length: 3.5 in  Additional Documentation: incremental injection, negative aspiration for heme and no paresthesia on injection  Needle localization: anatomical landmarks  Assessment   Dermatomal levels determined by alcohol wipe  Ease of block: easy  Patient's tolerance of the procedure: comfortable throughout block and no complaints  Medications:  Bolus administered: 2 mL of 0.75 and with dextrose bupivacaine

## 2018-01-02 NOTE — NURSING
Received amb to Triage D for scheduled RCS with BTL.  Discussed POC, teaching initiated.  States fetal movement present. Prepped with clippers, dusty wipes.  Placed on EFM.  Assessment completed

## 2018-01-02 NOTE — ASSESSMENT & PLAN NOTE
Consents reviewed in detail and signed.  Admit for repeat  delivery with bilateral tubal ligation.

## 2018-01-02 NOTE — OP NOTE
[facility]   Section   Operative Note    SUMMARY     Date of Procedure: 2018     Procedure: Procedure(s) (LRB):  DELIVERY- SECTION WITH TUBAL (N/A)    Surgeon(s) and Role:     * Georgina Pittman MD - Primary    Assistant:  Maria Isabel Mcdonnell    Pre-Operative Diagnosis: Previous  section [Z98.891]  Desires permanent sterilization    Post-Operative Diagnosis: Post-Op Diagnosis Codes:     * Previous  section [Z98.891]  Desires permanent sterilization    Anesthesia: Spinal/Epidural    Technical Procedures Used:   1.  Repeat low transverse  delivery via Pfannensteil skin incision  2.  Bilateral tubal ligation via modified Guido method           Description of the Findings of the Procedure: Normal uterus, tubes, and ovaries bilaterally.  No significant adhesions.  Copious amount of clear amniotic fluid.  Male infant, cephalic presentation, AGPARS 5,8,9.  [id number].  3 vessel cord.  Placenta delivered spontaneously, intact.  Bilateral tubal stumps hemostatic at the end of the procedure.    Significant Surgical Tasks Conducted by the Assistant(s), if Applicable: Retraction, exposure, hemostasis, closure    Complications: No    Blood Loss: * 400 mL *     With patient in supine position, the legs are  and Kemp Catheter placed and positioning to supine done.   Abdomen prepped with Chloroprep and 3 minute drying time allowed prior to draping of the abdomen.   Time out taken with OR team members.  Pfannenstiel Incision made through the skin, transverse fascial incision developed, rectus muscles  in the midline and the peritoneum entered.   no adhesions noted.  The lower uterine segment and position of the fetus identified.   Bladder flap taken down through transverse peritoneal incision.    Low Transverse Incision made through well developer lower uterine segment and extended laterally with blunt dissection.   Clear fluid noted.  Infant delivered from vertex  Please close encounter   presentation.  Cord clamped after one minute and  handed to attending nurse.  Cord blood taken, placenta delivered.  The uterus wasnot exteriorized.  The edges of the uterine incision are grasped with Ivy clamps at the angles and the inferior and superior midline edges of the incision.    Closure with running lock 0 Chromic, starting at each angle, tying in the midline.   Observation for bleeding with suture of any bleeding along the hysterotomy line.   With good hemostasis noted, the anterior pelvis is rinsed with sterile saline.   Right and left adnexa with normal anatomy.  A second time out was performed, and the patient confirmed she still desires tubal sterilization.  The left fallopian tube was grasped with the davide clamp and followed out to the fimbriated end.  A window was made in the mesosalpinx, and a 2 cm segment of the tube was doubly ligated with 0 plain gut, then excised with the Metzenbaum scissors and sent to pathology.  Excellent hemostasis confirmed.  The tube was returned to the abdomen, and the same procedure was repeated on the right fallopian tube.     The peritoneum and rectus muscle layers were under too much tension to reapproximate.  Fascial closure with 0 looped PDS starting at each angle and tying the knot in the midline.  Skin closure with 4 0 Vicryl subcuticular.  Wound dressed with Aquasel and pressure dressing.          Specimens:   Specimen (12h ago through future)    None          Condition: Good    Disposition: PACU - hemodynamically stable.    Attestation: Good

## 2018-01-02 NOTE — PROGRESS NOTES
CRNA notified of patient continued c/o pain and no relief from previously instructed pain  Regime of po Percocet for patient from CRNA. CRNA to contact anesthesiologist for pain orders.

## 2018-01-02 NOTE — H&P
[facility] -   Obstetrics  History & Physical    Patient Name: Hesham Palacios  MRN: [id number]  Admission Date: 2018  Primary Care Provider: Sol De La Cruz MD    Subjective:     Principal Problem:History of  section    History of Present Illness:  28 y/o  at 39w3d presents for scheduled elective repeat  delivery with bilateral tubal ligation.    Obstetric HPI:  Patient reports None contractions, active fetal movement, No vaginal bleeding , No loss of fluid.  Itching improved with vistaril.    This pregnancy has been complicated by hx of C/S x 2, desires permanent sterilization, obesity, fetal arrhythmia in the second trimester that resolved spontaneously, hx of HSV.    Obstetric History       T2      L2     SAB0   TAB0   Ectopic0   Multiple0   Live Births2       # Outcome Date GA Lbr Virgilio/2nd Weight Sex Delivery Anes PTL Lv   3 Current            2 Term 16 41w1d  3.94 kg (8 lb 11 oz) M CS-LTranv EPI N DANIEL      Apgar1:  9                Apgar5: 9   1 Term 11/23/10 40w0d  3.771 kg (8 lb 5 oz) M CS-Unspec   DANIEL      Complications: Preeclampsia        Past Medical History:   Diagnosis Date    Anemia     Herpes simplex virus (HSV) infection     Hx of migraine headaches      Past Surgical History:   Procedure Laterality Date     SECTION      x 2    DILATION AND CURETTAGE OF UTERUS         PTA Medications   Medication Sig    acetaminophen (TYLENOL) 325 MG tablet Take 325 mg by mouth every 6 (six) hours as needed for Pain.    hydrOXYzine pamoate (VISTARIL) 25 MG Cap Take 1 capsule (25 mg total) by mouth every 6 (six) hours as needed (itching).    -iron-FA-om-3s-dha-epa 3.33 mg iron- 0.33 mg Chew Take 2 each by mouth once daily at 6am.    valACYclovir (VALTREX) 500 MG tablet Take 1 tablet (500 mg total) by mouth once daily.       Review of patient's allergies indicates:   Allergen Reactions    Morphine Itching and Other (See Comments)      Burning sensation        Family History     None        Social History Main Topics    Smoking status: Never Smoker    Smokeless tobacco: Never Used    Alcohol use No    Drug use: No    Sexual activity: Yes     Partners: Male     Review of Systems   Constitutional: Negative for activity change, fatigue, fever and unexpected weight change.   Gastrointestinal: Negative for abdominal pain, bloating, constipation, diarrhea, nausea and vomiting.   Genitourinary: Negative for dyspareunia, dysuria, frequency, genital sores, hematuria, pelvic pain, urgency, vaginal bleeding, vaginal discharge, vaginal pain, dysmenorrhea, postcoital bleeding and vaginal odor.   Skin:  Negative for rash (itching the last several weeks improved with Vistaril) and hair changes.   Hematological: Negative for adenopathy.   Breast: Negative for breast mass, breast pain, nipple discharge and skin changes     Objective:     Vital Signs (Most Recent):  Temp: 98.4 °F (36.9 °C) (01/02/18 0600)  Pulse: 84 (01/02/18 0530)  BP: 131/86 (01/02/18 0600) Vital Signs (24h Range):  Temp:  [98.4 °F (36.9 °C)] 98.4 °F (36.9 °C)  Pulse:  [84] 84  BP: (125-131)/(74-86) 131/86        There is no height or weight on file to calculate BMI.    FHT: Cat 1 (reassuring)  TOCO:  occasional ctxns    Physical Exam:   Constitutional: She is oriented to person, place, and time. She appears well-developed and well-nourished. No distress.    HENT:   Head: Normocephalic and atraumatic.     Neck: Neck supple. No thyromegaly present.    Cardiovascular: Normal rate and regular rhythm.  Exam reveals no clubbing, no cyanosis and no edema.     Pulmonary/Chest: Effort normal. No respiratory distress. She has no wheezes. She has no rales.        Abdominal: Soft. She exhibits no distension and no mass. There is no tenderness. There is no rebound and no guarding.             Musculoskeletal: She exhibits no edema.       Neurological: She is alert and oriented to person, place, and  time. She displays normal reflexes.    Skin: No rash noted. No cyanosis. Nails show no clubbing.    Psychiatric: She has a normal mood and affect. Her behavior is normal. Judgment and thought content normal.            Significant Labs:  Lab Results   Component Value Date    GROUPTRH O POS 05/15/2017    HEPBSAG Negative 05/15/2017    STREPBCULT No Group B Streptococcus isolated 2017       CBC: No results for input(s): WBC, RBC, HGB, HCT, PLT, MCV, MCH, MCHC in the last 48 hours.    Assessment/Plan:     27 y.o. female  at 39w3d for:    * History of  section    Consents reviewed in detail and signed.  Admit for repeat  delivery with bilateral tubal ligation.        Request for sterilization    Bilateral tubal ligation at time of .        Pruritus of pregnancy in third trimester    Bile acids normal about 1 month ago.  Itching improved with Vistaril.  Continue vistaril post-op.            Georgina Pittman MD  Obstetrics  [facility]

## 2018-01-02 NOTE — ANESTHESIA PREPROCEDURE EVALUATION
01/02/2018  Hesham Palacios is a 27 y.o., female.    Anesthesia Evaluation    I have reviewed the Patient Summary Reports.    I have reviewed the Nursing Notes.   I have reviewed the Medications.     Review of Systems  Anesthesia Hx:  No problems with previous Anesthesia    Social:  Non-Smoker, No Alcohol Use    Hematology/Oncology:     Oncology Normal    -- Anemia:   EENT/Dental:EENT/Dental Normal   Cardiovascular:  Cardiovascular Normal     Pulmonary:  Pulmonary Normal    Renal/:  Renal/ Normal     Hepatic/GI:   GERD, well controlled    Musculoskeletal:  Musculoskeletal Normal    Neurological:  Neurology Normal    Endocrine:  Endocrine Normal    Dermatological:  Skin Normal    Psych:  Psychiatric Normal           Physical Exam  General:  Well nourished    Airway/Jaw/Neck:  Airway Findings: Mouth Opening: Normal Tongue: Normal  General Airway Assessment: Adult  Mallampati: II  TM Distance: Normal, at least 6 cm  Jaw/Neck Findings:  Neck ROM: Normal ROM      Dental:  Dental Findings: In tactUpper permanent retainer   Chest/Lungs:  Chest/Lungs Findings: Clear to auscultation, Normal Respiratory Rate     Heart/Vascular:  Heart Findings: Rate: Normal  Rhythm: Regular Rhythm  Sounds: Normal     Abdomen:  Abdomen Findings:  Normal       Mental Status:  Mental Status Findings:  Alert and Oriented, Cooperative         Anesthesia Plan  Type of Anesthesia, risks & benefits discussed:  Anesthesia Type:  spinal  Patient's Preference:   Intra-op Monitoring Plan: standard ASA monitors  Intra-op Monitoring Plan Comments:   Post Op Pain Control Plan:   Post Op Pain Control Plan Comments:   Induction:   IV  Beta Blocker:  Patient is not currently on a Beta-Blocker (No further documentation required).       Informed Consent: Patient understands risks and agrees with Anesthesia plan.  Questions answered. Anesthesia  consent signed with patient.  ASA Score: 2     Day of Surgery Review of History & Physical: I have interviewed and examined the patient. I have reviewed the patient's H&P dated: 01/02/2018. There are no significant changes.          Ready For Surgery From Anesthesia Perspective.

## 2018-01-02 NOTE — SUBJECTIVE & OBJECTIVE
Obstetric HPI:  Patient reports None contractions, active fetal movement, No vaginal bleeding , No loss of fluid.  Itching improved with vistaril.    This pregnancy has been complicated by hx of C/S x 2, desires permanent sterilization, obesity, fetal arrhythmia in the second trimester that resolved spontaneously, hx of HSV.    Obstetric History       T2      L2     SAB0   TAB0   Ectopic0   Multiple0   Live Births2       # Outcome Date GA Lbr Virgilio/2nd Weight Sex Delivery Anes PTL Lv   3 Current            2 Term 16 41w1d  3.94 kg (8 lb 11 oz) M CS-LTranv EPI N DANIEL      Apgar1:  9                Apgar5: 9   1 Term 11/23/10 40w0d  3.771 kg (8 lb 5 oz) M CS-Unspec   DANIEL      Complications: Preeclampsia        Past Medical History:   Diagnosis Date    Anemia     Herpes simplex virus (HSV) infection     Hx of migraine headaches      Past Surgical History:   Procedure Laterality Date     SECTION      x 2    DILATION AND CURETTAGE OF UTERUS         PTA Medications   Medication Sig    acetaminophen (TYLENOL) 325 MG tablet Take 325 mg by mouth every 6 (six) hours as needed for Pain.    hydrOXYzine pamoate (VISTARIL) 25 MG Cap Take 1 capsule (25 mg total) by mouth every 6 (six) hours as needed (itching).    -iron-FA-om-3s-dha-epa 3.33 mg iron- 0.33 mg Chew Take 2 each by mouth once daily at 6am.    valACYclovir (VALTREX) 500 MG tablet Take 1 tablet (500 mg total) by mouth once daily.       Review of patient's allergies indicates:   Allergen Reactions    Morphine Itching and Other (See Comments)     Burning sensation        Family History     None        Social History Main Topics    Smoking status: Never Smoker    Smokeless tobacco: Never Used    Alcohol use No    Drug use: No    Sexual activity: Yes     Partners: Male     Review of Systems   Constitutional: Negative for activity change, fatigue, fever and unexpected weight change.   Gastrointestinal: Negative for abdominal  pain, bloating, constipation, diarrhea, nausea and vomiting.   Genitourinary: Negative for dyspareunia, dysuria, frequency, genital sores, hematuria, pelvic pain, urgency, vaginal bleeding, vaginal discharge, vaginal pain, dysmenorrhea, postcoital bleeding and vaginal odor.   Skin:  Negative for rash (itching the last several weeks improved with Vistaril) and hair changes.   Hematological: Negative for adenopathy.   Breast: Negative for breast mass, breast pain, nipple discharge and skin changes     Objective:     Vital Signs (Most Recent):  Temp: 98.4 °F (36.9 °C) (01/02/18 0600)  Pulse: 84 (01/02/18 0530)  BP: 131/86 (01/02/18 0600) Vital Signs (24h Range):  Temp:  [98.4 °F (36.9 °C)] 98.4 °F (36.9 °C)  Pulse:  [84] 84  BP: (125-131)/(74-86) 131/86        There is no height or weight on file to calculate BMI.    FHT: Cat 1 (reassuring)  TOCO:  occasional ctxns    Physical Exam:   Constitutional: She is oriented to person, place, and time. She appears well-developed and well-nourished. No distress.    HENT:   Head: Normocephalic and atraumatic.     Neck: Neck supple. No thyromegaly present.    Cardiovascular: Normal rate and regular rhythm.  Exam reveals no clubbing, no cyanosis and no edema.     Pulmonary/Chest: Effort normal. No respiratory distress. She has no wheezes. She has no rales.        Abdominal: Soft. She exhibits no distension and no mass. There is no tenderness. There is no rebound and no guarding.             Musculoskeletal: She exhibits no edema.       Neurological: She is alert and oriented to person, place, and time. She displays normal reflexes.    Skin: No rash noted. No cyanosis. Nails show no clubbing.    Psychiatric: She has a normal mood and affect. Her behavior is normal. Judgment and thought content normal.            Significant Labs:  Lab Results   Component Value Date    GROUPTRH O POS 05/15/2017    HEPBSAG Negative 05/15/2017    STREPBCULT No Group B Streptococcus isolated 12/07/2017        CBC: No results for input(s): WBC, RBC, HGB, HCT, PLT, MCV, MCH, MCHC in the last 48 hours.

## 2018-01-02 NOTE — PLAN OF CARE
Problem: Patient Care Overview  Goal: Plan of Care Review  Outcome: Ongoing (interventions implemented as appropriate)  PAtient repeat c/s with BTL. Aquacel dsg with [id number] dressing over Aquacel.

## 2018-01-02 NOTE — L&D DELIVERY NOTE
[facility]   Section   Operative Note    SUMMARY     Date of Procedure: 2018     Procedure: Procedure(s) (LRB):  DELIVERY- SECTION WITH TUBAL (N/A)    Surgeon(s) and Role:     * Georgina Pittman MD - Primary    Assistant:  Maria Isabel Mcdonnell    Pre-Operative Diagnosis: Previous  section [Z98.891]  Desires permanent sterilization    Post-Operative Diagnosis: Post-Op Diagnosis Codes:     * Previous  section [Z98.891]  Desires permanent sterilization    Anesthesia: Spinal/Epidural    Technical Procedures Used:   1.  Repeat low transverse  delivery via Pfannensteil skin incision  2.  Bilateral tubal ligation via modified Guido method           Description of the Findings of the Procedure: Normal uterus, tubes, and ovaries bilaterally.  No significant adhesions.  Copious amount of clear amniotic fluid.  Male infant, cephalic presentation, AGPARS 5,8,9.  [id number].  3 vessel cord.  Placenta delivered spontaneously, intact.  Bilateral tubal stumps hemostatic at the end of the procedure.    Significant Surgical Tasks Conducted by the Assistant(s), if Applicable: Retraction, exposure, hemostasis, closure    Complications: No    Blood Loss: * 400 mL *     With patient in supine position, the legs are  and Kemp Catheter placed and positioning to supine done.   Abdomen prepped with Chloroprep and 3 minute drying time allowed prior to draping of the abdomen.   Time out taken with OR team members.  Pfannenstiel Incision made through the skin, transverse fascial incision developed, rectus muscles  in the midline and the peritoneum entered.   no adhesions noted.  The lower uterine segment and position of the fetus identified.   Bladder flap taken down through transverse peritoneal incision.    Low Transverse Incision made through well developer lower uterine segment and extended laterally with blunt dissection.   Clear fluid noted.  Infant delivered from vertex  presentation.  Cord clamped after one minute and  handed to attending nurse.  Cord blood taken, placenta delivered.  The uterus wasnot exteriorized.  The edges of the uterine incision are grasped with Ivy clamps at the angles and the inferior and superior midline edges of the incision.    Closure with running lock 0 Chromic, starting at each angle, tying in the midline.   Observation for bleeding with suture of any bleeding along the hysterotomy line.   With good hemostasis noted, the anterior pelvis is rinsed with sterile saline.   Right and left adnexa with normal anatomy.  A second time out was performed, and the patient confirmed she still desires tubal sterilization.  The left fallopian tube was grasped with the davdie clamp and followed out to the fimbriated end.  A window was made in the mesosalpinx, and a 2 cm segment of the tube was doubly ligated with 0 plain gut, then excised with the Metzenbaum scissors and sent to pathology.  Excellent hemostasis confirmed.  The tube was returned to the abdomen, and the same procedure was repeated on the right fallopian tube.     The peritoneum and rectus muscle layers were under too much tension to reapproximate.  Fascial closure with 0 looped PDS starting at each angle and tying the knot in the midline.  Skin closure with 4 0 Vicryl subcuticular.  Wound dressed with Aquasel and pressure dressing.          Specimens:   Specimen (12h ago through future)    None          Condition: Good    Disposition: PACU - hemodynamically stable.    Attestation: Good         Delivery Information for  Jl Palacios    Birth information:  YOB: 2017   Time of birth: 7:39 AM   Sex: male   Head Delivery Date/Time: 2018  7:39 AM   Delivery type: , Low Transverse   Gestational Age: 39w3d    Delivery Providers    Delivering clinician:  Georgina Pittman MD   Other personnel:   Provider Role   Shanell Springer RN Circulator   Juliana Leon RN  Registered Nurse   Maria Isabel Mcdonnell First Assist   Viky Godinez RN Surgical Tech                Measurements    Weight:  4520 g            Assessment    Living status:  Living  Apgars:     1 Minute:   5 Minute:   10 Minute:   15 Minute:   20 Minute:     Skin Color:   0  0  1      Heart Rate:   2  2  2      Reflex Irritability:   1  2  2      Muscle Tone:   1  2  2      Respiratory Effort:   1  2  2      Total:   5  8  9              Apgars Assigned By:  LAIKA KEITH RN         Assisted Delivery Details:    Forceps attempted?:  No  Vacuum extractor attempted?:  No         Shoulder Dystocia    Shoulder dystocia present?:  No           Presentation and Position    Presentation:   Vertex   Position:                   Interventions/Resuscitation    Method:  Bulb Suctioning, Tactile Stimulation, Deep Suctioning, CPAP, Blow By Oxygen       Cord    Vessels:  3 vessels  Complications:  None  Delayed Cord Clamping?:  Yes  Cord Clamped Date/Time:  2018  7:39 AM  Cord Blood Disposition:  Lab  Gases Sent?:  No  Stem Cell Collection (by MD):  No       Placenta    Date and time:  2018  7:40 AM  Removal:  Manual removal  Appearance:  Intact  Placenta disposition:  discarded           Labor Events:       labor: No     Labor Onset Date/Time:         Dilation Complete Date/Time:         Start Pushing Date/Time:       Rupture Date/Time:              Rupture type:           Fluid Amount:        Fluid Color:        Fluid Odor:        Membrane Status (PeriCalm):        Rupture Date/Time (PeriCalm):        Fluid Amount (PeriCalm):        Fluid Color (PeriCalm):         steroids: None     Antibiotics given for GBS: No     Induction:       Indications for induction:        Augmentation:       Indications for augmentation:       Labor complications: None     Additional complications:          Cervical ripening:                     Delivery:      Episiotomy: None     Indication for Episiotomy:       Perineal  Lacerations: None Repaired:      Periurethral Laceration: none Repaired:     Labial Laceration: none Repaired:     Sulcus Laceration: none Repaired:     Vaginal Laceration: No Repaired:     Cervical Laceration: No Repaired:     Repair suture:       Repair # of packets:       Vaginal delivery QBL (mL): 0      QBL (mL): 0     Combined Blood Loss (mL): 0     Vaginal Sweep Performed:       Surgicount Correct:         Other providers:       Anesthesia    Method:  Spinal          Details (if applicable):  Trial of Labor No    Categorization: Repeat    Priority: Routine   Indications for : Repeat Section   Incision Type: low transverse     Additional  information:  Forceps:    Vacuum:    Breech:    Observed anomalies    Other (Comments):

## 2018-01-02 NOTE — HOSPITAL COURSE
1/2/2018:  Pt underwent an uncomplicated scheduled repeat LTCS with BTL.  She delivered a healthy male infant.  She was transferred to [facility] for routine post-op care.

## 2018-01-02 NOTE — LACTATION NOTE
Lactation packet given and admit information reviewed. Mother verbalizes understanding of expected  behaviors and output for the first 48 hours of life.  Discussed the importance of cue based feedings on demand, unrestricted access to the breast, and frequent uninterrupted skin to skin contact.  Risk and implications of artificial nipples and supplementation discussed.  Encouraged mother to call for assistance when desired or when infant is showing signs of hunger, contact number provided, mother verbalizes understanding.    Baby was eating on the left breast. Latch seemed adequate, but baby is full of fluids, needed to bulb syringe suction baby a little bit for Soni to be comfortable.

## 2018-01-03 PROCEDURE — 63600175 PHARM REV CODE 636 W HCPCS: Performed by: OBSTETRICS & GYNECOLOGY

## 2018-01-03 PROCEDURE — 25000003 PHARM REV CODE 250: Performed by: PHYSICIAN ASSISTANT

## 2018-01-03 PROCEDURE — 99232 SBSQ HOSP IP/OBS MODERATE 35: CPT | Mod: ,,, | Performed by: PHYSICIAN ASSISTANT

## 2018-01-03 PROCEDURE — 25000003 PHARM REV CODE 250: Performed by: OBSTETRICS & GYNECOLOGY

## 2018-01-03 PROCEDURE — 11000001 HC ACUTE MED/SURG PRIVATE ROOM

## 2018-01-03 RX ORDER — HYDROXYZINE PAMOATE 25 MG/1
25 CAPSULE ORAL ONCE
Status: COMPLETED | OUTPATIENT
Start: 2018-01-03 | End: 2018-01-03

## 2018-01-03 RX ORDER — DIPHENHYDRAMINE HCL 25 MG
50 CAPSULE ORAL EVERY 8 HOURS
Status: DISCONTINUED | OUTPATIENT
Start: 2018-01-03 | End: 2018-01-04 | Stop reason: HOSPADM

## 2018-01-03 RX ADMIN — HYDROXYZINE PAMOATE 25 MG: 25 CAPSULE ORAL at 08:01

## 2018-01-03 RX ADMIN — OXYCODONE HYDROCHLORIDE AND ACETAMINOPHEN 1 TABLET: 10; 325 TABLET ORAL at 04:01

## 2018-01-03 RX ADMIN — HYDROMORPHONE HYDROCHLORIDE 1 MG: 1 INJECTION, SOLUTION INTRAMUSCULAR; INTRAVENOUS; SUBCUTANEOUS at 04:01

## 2018-01-03 RX ADMIN — OXYCODONE HYDROCHLORIDE AND ACETAMINOPHEN 1 TABLET: 10; 325 TABLET ORAL at 03:01

## 2018-01-03 RX ADMIN — KETOROLAC TROMETHAMINE 30 MG: 30 INJECTION, SOLUTION INTRAMUSCULAR at 06:01

## 2018-01-03 RX ADMIN — DIPHENHYDRAMINE HYDROCHLORIDE 25 MG: 50 INJECTION, SOLUTION INTRAMUSCULAR; INTRAVENOUS at 04:01

## 2018-01-03 RX ADMIN — IBUPROFEN 800 MG: 800 TABLET ORAL at 06:01

## 2018-01-03 RX ADMIN — OXYCODONE HYDROCHLORIDE AND ACETAMINOPHEN 1 TABLET: 10; 325 TABLET ORAL at 10:01

## 2018-01-03 RX ADMIN — DOCUSATE SODIUM 100 MG: 100 CAPSULE, LIQUID FILLED ORAL at 08:01

## 2018-01-03 RX ADMIN — HYDROMORPHONE HYDROCHLORIDE 1 MG: 1 INJECTION, SOLUTION INTRAMUSCULAR; INTRAVENOUS; SUBCUTANEOUS at 09:01

## 2018-01-03 RX ADMIN — DIPHENHYDRAMINE HYDROCHLORIDE 50 MG: 25 CAPSULE ORAL at 12:01

## 2018-01-03 RX ADMIN — HYDROMORPHONE HYDROCHLORIDE 1 MG: 1 INJECTION, SOLUTION INTRAMUSCULAR; INTRAVENOUS; SUBCUTANEOUS at 02:01

## 2018-01-03 RX ADMIN — OXYCODONE HYDROCHLORIDE AND ACETAMINOPHEN 1 TABLET: 10; 325 TABLET ORAL at 08:01

## 2018-01-03 NOTE — TRANSFER OF CARE
Anesthesia Transfer of Care Note    Patient: Hesham Palacios    Procedure(s) Performed: Procedure(s) (LRB):  DELIVERY- SECTION WITH TUBAL (N/A)    Patient location: Labor and Delivery    Anesthesia Type: spinal    Transport from OR: Transported from OR on room air with adequate spontaneous ventilation    Post pain: adequate analgesia    Post assessment: no apparent anesthetic complications    Post vital signs: stable    Level of consciousness: awake, alert and oriented    Nausea/Vomiting: no nausea/vomiting    Complications: none    Transfer of care protocol was followed      Last vitals:   Visit Vitals  /86   Pulse 84   Temp 36.6 °C (97.8 °F) (Oral)   Resp 20   LMP 2017   SpO2 100%   Breastfeeding? Unknown

## 2018-01-03 NOTE — ANESTHESIA POSTPROCEDURE EVALUATION
Anesthesia Post Evaluation    Patient: Hesham Palacios    Procedure(s) Performed: Procedure(s) (LRB):  DELIVERY- SECTION WITH TUBAL (N/A)    Final Anesthesia Type: epidural  Patient location during evaluation: labor & delivery  Patient participation: Yes- Able to Participate  Level of consciousness: awake and alert  Post-procedure vital signs: reviewed and stable  Pain management: adequate  PONV status at discharge: No PONV  Anesthetic complications: no      Cardiovascular status: blood pressure returned to baseline  Respiratory status: unassisted, spontaneous ventilation and room air  Hydration status: euvolemic  Follow-up not needed.        Visit Vitals  /86   Pulse 84   Temp 36.6 °C (97.8 °F) (Oral)   Resp 20   LMP 2017   SpO2 100%   Breastfeeding? Unknown       Pain/Celina Score: Pain Rating Prior to Med Admin: 8 (2018  3:16 PM)  Pain Rating Post Med Admin: 6 (2018  4:16 PM)  Celina Score: 10 (2018 10:30 AM)

## 2018-01-03 NOTE — SUBJECTIVE & OBJECTIVE
Hospital course: 1/2/2018:  Pt underwent an uncomplicated scheduled repeat LTCS with BTL.  She delivered a healthy male infant.  She was transferred to [facility] for routine post-op care.    Interval History:     She is doing well this morning. She is tolerating a regular diet without nausea or vomiting. She is voiding spontaneously. She is ambulating. She has passed flatus, and has not a BM. Vaginal bleeding is mild. She denies fever or chills. Abdominal pain is mild and controlled with oral medications. She is breastfeeding. She desires circumcision for her male baby: yes.    Objective:     Vital Signs (Most Recent):  Temp: 99 °F (37.2 °C) (01/03/18 0400)  Pulse: 86 (01/03/18 0400)  Resp: 18 (01/03/18 0400)  BP: 98/60 (01/03/18 0400)  SpO2: 100 % (01/02/18 0915) Vital Signs (24h Range):  Temp:  [97.8 °F (36.6 °C)-99 °F (37.2 °C)] 99 °F (37.2 °C)  Pulse:  [] 86  Resp:  [16-20] 18  SpO2:  [95 %-100 %] 100 %  BP: ()/(60-87) 98/60        There is no height or weight on file to calculate BMI.      Intake/Output Summary (Last 24 hours) at 01/03/18 0644  Last data filed at 01/02/18 2030   Gross per 24 hour   Intake             1700 ml   Output             2050 ml   Net             -350 ml       Significant Labs:  Lab Results   Component Value Date    GROUPTRH O POS 01/02/2018    HEPBSAG Negative 05/15/2017    STREPBCULT No Group B Streptococcus isolated 12/07/2017       Recent Labs  Lab 01/02/18  0545   HGB 10.7*   HCT 29.9*       I have personallly reviewed all pertinent lab results from the last 24 hours.    Physical Exam:   Constitutional: She is oriented to person, place, and time. She appears well-developed and well-nourished.    HENT:   Head: Normocephalic.     Neck: Normal range of motion. No thyromegaly present.    Cardiovascular: Normal rate and regular rhythm.     Pulmonary/Chest: Effort normal and breath sounds normal.        Abdominal: Soft. Bowel sounds are normal. She exhibits abdominal incision  (aquasel dressing clean dry & intact).     Genitourinary:   Genitourinary Comments: Fundus nontender near the umbilicus             Musculoskeletal: Normal range of motion and moves all extremeties.       Neurological: She is alert and oriented to person, place, and time.    Skin: Skin is warm.    Psychiatric: She has a normal mood and affect.

## 2018-01-03 NOTE — PROGRESS NOTES
[facility] -   Obstetrics  Postpartum Progress Note    Patient Name: Hesham Palacios  MRN: [id number]  Admission Date: 2018  Hospital Length of Stay: 1 days  Attending Physician: Jenni Antoine MD  Primary Care Provider: Sol De La Cruz MD    Subjective:     Principal Problem:Status post repeat low transverse  section    Hospital course: 2018:  Pt underwent an uncomplicated scheduled repeat LTCS with BTL.  She delivered a healthy male infant.  She was transferred to [facility] for routine post-op care.    Interval History:     She is doing well this morning. She is tolerating a regular diet without nausea or vomiting. She is voiding spontaneously. She is ambulating. She has passed flatus, and has not a BM. Vaginal bleeding is mild. She denies fever or chills. Abdominal pain is mild and controlled with oral medications. She is breastfeeding. She desires circumcision for her male baby: yes.    Objective:     Vital Signs (Most Recent):  Temp: 99 °F (37.2 °C) (18 0400)  Pulse: 86 (18 0400)  Resp: 18 (18 0400)  BP: 98/60 (18 0400)  SpO2: 100 % (18 0915) Vital Signs (24h Range):  Temp:  [97.8 °F (36.6 °C)-99 °F (37.2 °C)] 99 °F (37.2 °C)  Pulse:  [] 86  Resp:  [16-20] 18  SpO2:  [95 %-100 %] 100 %  BP: ()/(60-87) 98/60        There is no height or weight on file to calculate BMI.      Intake/Output Summary (Last 24 hours) at 18 0644  Last data filed at 18 2030   Gross per 24 hour   Intake             1700 ml   Output             2050 ml   Net             -350 ml       Significant Labs:  Lab Results   Component Value Date    GROUPTRH O POS 2018    HEPBSAG Negative 05/15/2017    STREPBCULT No Group B Streptococcus isolated 2017       Recent Labs  Lab 18  0545   HGB 10.7*   HCT 29.9*       I have personallly reviewed all pertinent lab results from the last 24 hours.    Physical Exam:   Constitutional: She is oriented to person,  place, and time. She appears well-developed and well-nourished.    HENT:   Head: Normocephalic.     Neck: Normal range of motion. No thyromegaly present.    Cardiovascular: Normal rate and regular rhythm.     Pulmonary/Chest: Effort normal and breath sounds normal.        Abdominal: Soft. Bowel sounds are normal. She exhibits abdominal incision (aquasel dressing clean dry & intact).     Genitourinary:   Genitourinary Comments: Fundus nontender near the umbilicus             Musculoskeletal: Normal range of motion and moves all extremeties.       Neurological: She is alert and oriented to person, place, and time.    Skin: Skin is warm.    Psychiatric: She has a normal mood and affect.       Assessment/Plan:     27 y.o. female  for:    * Status post repeat low transverse  section with bilateral tubal ligation    Consents reviewed in detail and signed.  Admit for repeat  delivery with bilateral tubal ligation.    Patient is POD#1 repeat C/S with BTL.  Doing well this am.  Ambulating around room.  Attempting to breastfeed.  Discussed goals for discharge and possibility of going home tomorrow.  Patient verbalizes understanding.          Request for sterilization    Bilateral tubal ligation at time of .        Pruritus of pregnancy in third trimester    Bile acids normal about 1 month ago.  Itching improved with Vistaril.  Continue vistaril post-op.            Disposition: As patient meets milestones, will plan to discharge tomorrow.    Sonam Jewell PA-C  Obstetrics  [facility]

## 2018-01-03 NOTE — PHYSICIAN QUERY
PT Name: Hesham Palacios  MR #: [id number]     Physician Query Form - Documentation Clarification      CDS/: LOY [email or web address],[email or web address]          Contact information:[email or web address]    This form is a permanent document in the medical record.     Query Date: January 3, 2018    By submitting this query, we are merely seeking further clarification of documentation. Please utilize your independent clinical judgment when addressing the question(s) below.    The Medical record reflects the following:    Supporting Clinical Findings Location in Medical Record   This pregnancy has been complicated by hx of C/S x 2, desires permanent sterilization, obesity, fetal arrhythmia in the second trimester that resolved spontaneously, hx of HSV.    PTA Medications:  valACYclovir (VALTREX) 500 MG tablet  Take 1 tablet (500 mg total) by mouth once daily    Technical Procedures Used:   1.  Repeat low transverse  delivery via Pfannensteil skin incision  2.  Bilateral tubal ligation via modified Guido method H&P 1/2                  Op note 1/2                                                                                      Doctor, Please specify type of HSV complicating childbirth    Provider Use Only        [  ] Genital, unspecified  [  ] Oral  [ X ] Vulva  [  ] Vagina  [  ] Cervix  [  ] Labia  [  ] Anus  [  ] Other, please specify:___________________________________________                                                                                                                 [  ] Clinically undetermined

## 2018-01-03 NOTE — ANESTHESIA RELEASE NOTE
Anesthesia Release from PACU Note    Patient: Hesham Palacios    Procedure(s) Performed: Procedure(s) (LRB):  DELIVERY- SECTION WITH TUBAL (N/A)    Anesthesia type: spinal    Post pain: Adequate analgesia    Post assessment: no apparent anesthetic complications and tolerated procedure well    Last Vitals:   Visit Vitals  /86   Pulse 84   Temp 36.6 °C (97.8 °F) (Oral)   Resp 20   LMP 2017   SpO2 100%   Breastfeeding? Unknown       Post vital signs: stable    Level of consciousness: awake, alert  and oriented    Nausea/Vomiting: no nausea/no vomiting    Complications: none    Airway Patency: patent    Respiratory: unassisted, spontaneous ventilation, room air    Cardiovascular: stable and blood pressure at baseline    Hydration: euvolemic

## 2018-01-03 NOTE — PLAN OF CARE
Problem: Patient Care Overview  Goal: Plan of Care Review  Outcome: Ongoing (interventions implemented as appropriate)  Pt progressing, bonding well with baby boy. VSS. C/o pain throughout the night, controlled with toradol, percocet, and Dilaudid as needed. Breastfeeding baby. NAD. Will continue to monitor progress.

## 2018-01-03 NOTE — ASSESSMENT & PLAN NOTE
Consents reviewed in detail and signed.  Admit for repeat  delivery with bilateral tubal ligation.    Patient is POD#1 repeat C/S with BTL.  Doing well this am.  Ambulating around room.  Attempting to breastfeed.  Discussed goals for discharge and possibility of going home tomorrow.  Patient verbalizes understanding.

## 2018-01-04 VITALS
OXYGEN SATURATION: 100 % | HEART RATE: 88 BPM | RESPIRATION RATE: 18 BRPM | SYSTOLIC BLOOD PRESSURE: 118 MMHG | TEMPERATURE: 99 F | DIASTOLIC BLOOD PRESSURE: 74 MMHG

## 2018-01-04 PROBLEM — Z3A.38 PREGNANCY WITH 38 COMPLETED WEEKS GESTATION: Status: RESOLVED | Noted: 2018-01-02 | Resolved: 2018-01-04

## 2018-01-04 PROCEDURE — 25000003 PHARM REV CODE 250: Performed by: OBSTETRICS & GYNECOLOGY

## 2018-01-04 PROCEDURE — 25000003 PHARM REV CODE 250: Performed by: PHYSICIAN ASSISTANT

## 2018-01-04 PROCEDURE — 99238 HOSP IP/OBS DSCHRG MGMT 30/<: CPT | Mod: ,,, | Performed by: PHYSICIAN ASSISTANT

## 2018-01-04 PROCEDURE — 63600175 PHARM REV CODE 636 W HCPCS: Performed by: OBSTETRICS & GYNECOLOGY

## 2018-01-04 RX ORDER — OXYCODONE AND ACETAMINOPHEN 10; 325 MG/1; MG/1
1 TABLET ORAL EVERY 6 HOURS PRN
Qty: 25 TABLET | Refills: 0 | Status: SHIPPED | OUTPATIENT
Start: 2018-01-04 | End: 2019-02-01

## 2018-01-04 RX ORDER — IBUPROFEN 800 MG/1
800 TABLET ORAL EVERY 8 HOURS
Qty: 60 TABLET | Refills: 0 | Status: SHIPPED | OUTPATIENT
Start: 2018-01-04 | End: 2019-02-01

## 2018-01-04 RX ADMIN — IBUPROFEN 800 MG: 800 TABLET ORAL at 05:01

## 2018-01-04 RX ADMIN — HYDROMORPHONE HYDROCHLORIDE 1 MG: 1 INJECTION, SOLUTION INTRAMUSCULAR; INTRAVENOUS; SUBCUTANEOUS at 02:01

## 2018-01-04 RX ADMIN — DIPHENHYDRAMINE HYDROCHLORIDE 50 MG: 25 CAPSULE ORAL at 05:01

## 2018-01-04 RX ADMIN — OXYCODONE HYDROCHLORIDE AND ACETAMINOPHEN 1 TABLET: 10; 325 TABLET ORAL at 12:01

## 2018-01-04 RX ADMIN — DOCUSATE SODIUM 100 MG: 100 CAPSULE, LIQUID FILLED ORAL at 09:01

## 2018-01-04 RX ADMIN — OXYCODONE HYDROCHLORIDE AND ACETAMINOPHEN 1 TABLET: 10; 325 TABLET ORAL at 09:01

## 2018-01-04 NOTE — ASSESSMENT & PLAN NOTE
Consents reviewed in detail and signed.  Admit for repeat  delivery with bilateral tubal ligation.    Patient is POD#1 repeat C/S with BTL.  Doing well this am.  Ambulating around room.  Attempting to breastfeed.  Discussed goals for discharge and possibility of going home tomorrow.  Patient verbalizes understanding.      POD#2 repeat C/S and BTL.  Patient ready to go home, she has met all goals for discharge.  Will send home with prescriptions for pain medicine.  Follow up appts made.

## 2018-01-04 NOTE — DISCHARGE SUMMARY
[facility] -   Obstetrics  Discharge Summary      Patient Name: Hesham Palacios  MRN: [id number]  Admission Date: 2018  Hospital Length of Stay: 2 days  Discharge Date and Time:  2018 8:23 AM  Attending Physician: Jenni Antoine MD   Discharging Provider: Sonam Jewell PA-C  Primary Care Provider: Sol De La Cruz MD    HPI: 26 y/o  at 39w3d presents for scheduled elective repeat  delivery with bilateral tubal ligation.    Procedure(s) (LRB):  DELIVERY- SECTION WITH TUBAL (N/A)     Hospital Course:   2018:  Pt underwent an uncomplicated scheduled repeat LTCS with BTL.  She delivered a healthy male infant.  She was transferred to [facility] for routine post-op care.        Final Active Diagnoses:    Diagnosis Date Noted POA    PRINCIPAL PROBLEM:  Status post repeat low transverse  section with bilateral tubal ligation [Z98.891] 2015 Not Applicable    Request for sterilization [Z78.9] 2018 Yes    Pruritus of pregnancy in third trimester [O99.713, L29.9] 2017 Yes      Problems Resolved During this Admission:    Diagnosis Date Noted Date Resolved POA    Pregnancy with 38 completed weeks gestation [Z3A.38] 2018 Not Applicable        Labs: All labs have been reviewed.      Feeding Method: both breast and bottle    Immunizations     None          Delivery:    Episiotomy: None   Lacerations: None   Repair suture:     Repair # of packets:     Blood loss (ml): 0     Birth information:  YOB: 2017   Time of birth: 7:39 AM   Sex: male   Delivery type: , Low Transverse   Gestational Age: 39w3d    Delivery Clinician:      Other providers:       Additional  information:  Forceps:    Vacuum:    Breech:    Observed anomalies      Living?:           APGARS  One minute Five minutes Ten minutes   Skin color:         Heart rate:         Grimace:         Muscle tone:         Breathing:         Totals: 5  8  9       Placenta: Delivered:       appearance    Pending Diagnostic Studies:     None          Discharged Condition: good    Disposition: Home or Self Care    Follow Up:  Follow-up Information     Georgina Pittman MD In 4 weeks.    Specialties:  Obstetrics and Gynecology, Obstetrics  Why:  Post op visit  Contact information:  [address] [place] [address] DR Chava CLINE [address]  [phone]             OB GYN NURSE, MAVIS In 1 week.    Why:  Bandage removal               Patient Instructions:     Call MD for:  temperature >100.4     Call MD for:  severe uncontrolled pain     Call MD for:  redness, tenderness, or signs of infection (pain, swelling, redness, odor or green/yellow discharge around incision site)     Call MD for:  severe persistent headache     Call MD for:  persistent dizziness, light-headedness, or visual disturbances     Leave dressing on - Keep it clean, dry, and intact until clinic visit   Order Comments: Showers only- no baths.       Medications:  Current Discharge Medication List      START taking these medications    Details   ibuprofen (ADVIL,MOTRIN) 800 MG tablet Take 1 tablet (800 mg total) by mouth every 8 (eight) hours.  Qty: 60 tablet, Refills: 0    Comments: Please deliver to patient's hospital room when ready. Patient to be discharged today.      nozaseptin (NOZIN) nasal  2 drops by Each Nare route 2 (two) times daily.  Qty: 1 each, Refills: 0      oxyCODONE-acetaminophen (PERCOCET)  mg per tablet Take 1 tablet by mouth every 6 (six) hours as needed.  Qty: 25 tablet, Refills: 0         CONTINUE these medications which have NOT CHANGED    Details   hydrOXYzine pamoate (VISTARIL) 25 MG Cap Take 1 capsule (25 mg total) by mouth every 6 (six) hours as needed (itching).  Qty: 30 capsule, Refills: 0    Associated Diagnoses: Pruritus of pregnancy in third trimester      -iron-FA-om-3s-dha-epa 3.33 mg iron- 0.33 mg Chew Take 2 each by mouth once daily at 6am.      valACYclovir (VALTREX)  500 MG tablet Take 1 tablet (500 mg total) by mouth once daily.  Qty: 30 tablet, Refills: 1    Associated Diagnoses: HSV (herpes simplex virus) anogenital infection         STOP taking these medications       acetaminophen (TYLENOL) 325 MG tablet Comments:   Reason for Stopping:               Sonam Jewell PA-C  Obstetrics  [facility]

## 2018-01-04 NOTE — PLAN OF CARE
Problem: Patient Care Overview  Goal: Plan of Care Review  Outcome: Ongoing (interventions implemented as appropriate)  Pt progressing, bonding well with baby boy. VSS. C/o pain throughout the night, controlled with motrin, percocet, and Dilaudid as needed. Breastfeeding baby. NAD. Will continue to monitor progress.

## 2018-01-04 NOTE — PLAN OF CARE
VVS.viable patient.Fundus below umbilicus. Lochia rubra and light per patient report. Breast feeding infant. Mother and infant appear to be bonding well. Patient educated on self discharge care and infant discharge care. Patient discharged via wheelchair with infant in lap.

## 2018-01-04 NOTE — DISCHARGE INSTRUCTIONS
Mother Self Care:    Activity: Avoid strenuous exercise and get adequate rest.  No driving until your physician gives you consent.  Emotional Changes: The grieving process has many different stages, be prepared to experience lots of emotional ups and downs. Identify people to be your support system, and do not hesitate to call our  if you need someone to talk to.   Breast Care: You may notice milk leaking from your breasts. Wear a support bra 24 hours a day for one week or wrap breasts in an ace bandage if needed to stop milk production.  Avoid stimulation to breasts.  You may use ice packs for discomfort.  Hal-Care/Vaginal Bleeding: Remember to use your hal-bottle after urinating.  Your flow will change from red, to pink, to yellow/white color over a period of 2 weeks.  Menstruation will return in 3-8 weeks.  Episiotomy Vaginal Delivery: Stitches will dissolve within 10 days to 3 weeks.  Warm baths, tucks, and dermoplast spray will promote healing.  Avoid bubble baths or strong soaps.   Section/Tubal Ligation: Keep incision clean and dry.  Please remove steri-strips in 5-7 days.  You may shower, but avoid baths.  Sexual Activity/Pelvic Rest: No sexual activity, tampons, or douching until your physician gives you consent.  Diet: Continue to eat from the five basic food groups, including plenty of protein, fruits, vegetables, and whole grains.  Limit empty calories and high fat foods.  Drink enough fluids to satisfy thirst.  Constipation/Hemorrhoids: Drink plenty of water.  You may take a stool softener or natural laxative (Metamucil). You may use tucks or hemorrhoid ointment and soak in a warm tub.    CALL YOUR OB DOCTOR IF ANY OF THE FOLLOWING OCCURS:  *Heavy bleeding - saturating a pad an hour or passing any large (2-3 inches in size) blood clots.  *Any pain, redness, or tenderness in lower leg.  *You cannot care for yourself  *Any signs of infection-      - Temperature greater than 100.5  degrees F      - Foul smelling vaginal discharge and/or incisional drainage      - Increased episiotomy or incisional pain      - Hot, hard, red or sore area on breast      - Flu-like symptoms      - Any urgency, frequency or burning with urination

## 2018-01-04 NOTE — PLAN OF CARE
VVS.viable patient . Fundus below umbilicus. Lochia rubra and light. Patient and infant bonding well. Progress will continue to be monitored.

## 2018-01-04 NOTE — LACTATION NOTE
Infant weight loss at 9.8 %  Output is WDL.   Reviewed with mother signs to look for at home to see if baby is eating enough. Baby is showing feeding cues. Helped mother to settle in a cross cradle hold position on the right breast. Reviewed deep asymmetric latch and proper positioning. Mother is able to demonstrate back and deep latch easily obtained. Audible swallows noted, and mother denies pain or discomfort. Copious swallows and milk noted. Baby fed until content, and nipple shape and color is WDL upon unlatching. Reviewed hand expression and nipple care; mother able to return back demonstration. Mom's milk is coming in. Reviewed engorgement measures.     Lactation discharge information reviewed.  Mother is aware of warm line, and outpatient consultations and monthly support gatherings. Encouraged mother to contact lactation with any questions, concerns, or problems. Contact numbers provided, and mother verbalizes understanding.     01/04/18 0900   Maternal Infant Assessment   Breast Size Issue none   Breast Shape Bilateral:;round   Breast Density Bilateral:;soft   Areola Bilateral:;elastic   Nipple(s) Bilateral:;everted   Nipple Symptoms bilateral:;tender   Infant Assessment   Weight Loss (%) 9.8   Number of Stools (24 hours) 5   Number of Voids (24 hours) 4   LATCH Score   Latch 2-->grasps breast, tongue down, lips flanged, rhythmic sucking   Audible Swallowing 2-->spontaneous and intermittent (24 hrs old)   Type Of Nipple 2-->everted (after stimulation)   Comfort (Breast/Nipple) 1-->filling, red/small blisters/bruises, mild/mod discomfort   Hold (Positioning) 2-->no assist from staff, mother able to position/hold infant   Score (less than 7 for 2/more consecutive times, consult Lactation Consultant) 9   Maternal Infant Feeding   Maternal Preparation hand hygiene;breast care   Maternal Emotional State independent;relaxed   Infant Positioning cross-cradle   Signs of Milk Transfer audible swallow   Presence of  Pain no   Feeding Infant   Feeding Readiness Cues eager   Satiety Cues calm after feeding   Feeding Tolerance/Success adequate pause for breath;strong suck   Lactation Referrals   Lactation Referrals support group  (denied WIC referal)   Lactation Interventions   Attachment Promotion skin-to-skin contact encouraged;rooming-in promoted;role responsibility promoted;privacy provided;infant-mother separation minimized;family involvement promoted;face-to-face positioning promoted;environment adjusted;breastfeeding assistance provided;counseling provided   Breast Care: Breastfeeding manual expression to soften breast;milk massaged towards nipple;lanolin to nipple(s) applied   Breastfeeding Assistance assisted with positioning;both breasts offered each feeding;feeding on demand promoted;feeding cue recognition promoted;feeding session observed;support offered   Maternal Breastfeeding Support encouragement offered;infant-mother separation minimized;lactation counseling provided   Latch Promotion positioning assisted

## 2018-01-08 ENCOUNTER — POSTPARTUM VISIT (OUTPATIENT)
Dept: OBSTETRICS AND GYNECOLOGY | Facility: CLINIC | Age: 28
End: 2018-01-08
Payer: MEDICAID

## 2018-01-08 DIAGNOSIS — R60.9 EDEMA, UNSPECIFIED TYPE: Primary | ICD-10-CM

## 2018-01-08 PROCEDURE — 99213 OFFICE O/P EST LOW 20 MIN: CPT | Mod: S$PBB,,, | Performed by: ADVANCED PRACTICE MIDWIFE

## 2018-01-08 NOTE — PROGRESS NOTES
"Hesham Palacios is a 27 y.o. female  presents to office as "walk-in" secondary to ankle swelling. Had pediatric visit  in same building and wanted to get checked out. Has appt with Dr PARIS Maldonado in 2 days.States  Has not been able to rest as  sick. BP is normal She is status post repeat C/S  6 days  ago.  Her hospitalization was not complicated.  She is breastfeeding.    Still establishing breast feeding, baby lost 10pounds and will be seen again next week. She is using natural methods eg drinking lemon water "cabbage to ankles". As trying to establish milk supply will defer lasix/diuretic RX  For now. Continue with present methods and elevate legs when able. Keep appt    Past Medical History:   Diagnosis Date    Anemia     Herpes simplex virus (HSV) infection     Hx of migraine headaches      Past Surgical History:   Procedure Laterality Date     SECTION      x 2    DILATION AND CURETTAGE OF UTERUS       Review of patient's allergies indicates:   Allergen Reactions    Morphine Itching and Other (See Comments)     Burning sensation       Current Outpatient Prescriptions:     hydrOXYzine pamoate (VISTARIL) 25 MG Cap, Take 1 capsule (25 mg total) by mouth every 6 (six) hours as needed (itching)., Disp: 30 capsule, Rfl: 0    ibuprofen (ADVIL,MOTRIN) 800 MG tablet, Take 1 tablet (800 mg total) by mouth every 8 (eight) hours., Disp: 60 tablet, Rfl: 0    nozaseptin (NOZIN) nasal , 2 drops by Each Nare route 2 (two) times daily., Disp: 1 each, Rfl: 0    oxyCODONE-acetaminophen (PERCOCET)  mg per tablet, Take 1 tablet by mouth every 6 (six) hours as needed., Disp: 25 tablet, Rfl: 0    -iron-FA-om-3s-dha-epa 3.33 mg iron- 0.33 mg Chew, Take 2 each by mouth once daily at 6am., Disp: , Rfl:     valACYclovir (VALTREX) 500 MG tablet, Take 1 tablet (500 mg total) by mouth once daily., Disp: 30 tablet, Rfl: 1      There were no vitals filed for this visit.    GENERAL: " healthy, no distress, cooperative  Extremities- 3+ ankle edema, 2+ tibial edema with normal BP    Assessment:  Lower extremity edema 6 days S/P C/S    Plan:   Continue natural methods and FU in 2 days or prn  Try to rest more

## 2018-01-10 ENCOUNTER — CLINICAL SUPPORT (OUTPATIENT)
Dept: OBSTETRICS AND GYNECOLOGY | Facility: CLINIC | Age: 28
End: 2018-01-10
Payer: MEDICAID

## 2018-01-12 ENCOUNTER — PATIENT MESSAGE (OUTPATIENT)
Dept: OBSTETRICS AND GYNECOLOGY | Facility: CLINIC | Age: 28
End: 2018-01-12

## 2018-02-05 ENCOUNTER — TELEPHONE (OUTPATIENT)
Dept: OBSTETRICS AND GYNECOLOGY | Facility: CLINIC | Age: 28
End: 2018-02-05

## 2018-02-05 NOTE — TELEPHONE ENCOUNTER
----- Message from Kaylen Avery sent at 2/5/2018  9:09 AM CST -----  Contact: patient  Calling concerning getting her appointment around 02/12- 02/16. States she is off that week and rather than miss work she would like to come then. Please call patient ASAP @ [phone]. Thanksarturo

## 2018-02-16 ENCOUNTER — POSTPARTUM VISIT (OUTPATIENT)
Dept: OBSTETRICS AND GYNECOLOGY | Facility: CLINIC | Age: 28
End: 2018-02-16
Payer: MEDICAID

## 2018-02-16 VITALS
HEIGHT: 62 IN | WEIGHT: 180.31 LBS | SYSTOLIC BLOOD PRESSURE: 134 MMHG | DIASTOLIC BLOOD PRESSURE: 87 MMHG | BODY MASS INDEX: 33.18 KG/M2

## 2018-02-16 DIAGNOSIS — Z12.4 PAP SMEAR FOR CERVICAL CANCER SCREENING: ICD-10-CM

## 2018-02-16 DIAGNOSIS — Z98.891 STATUS POST REPEAT LOW TRANSVERSE CESAREAN SECTION: Primary | ICD-10-CM

## 2018-02-16 PROBLEM — O99.713 PRURITUS OF PREGNANCY IN THIRD TRIMESTER: Status: RESOLVED | Noted: 2017-12-28 | Resolved: 2018-02-16

## 2018-02-16 PROBLEM — Z30.2 REQUEST FOR STERILIZATION: Status: RESOLVED | Noted: 2018-01-02 | Resolved: 2018-02-16

## 2018-02-16 PROBLEM — O98.511 HERPES SIMPLEX VIRUS TYPE 2 (HSV-2) INFECTION AFFECTING PREGNANCY IN FIRST TRIMESTER, ANTEPARTUM: Status: RESOLVED | Noted: 2017-06-22 | Resolved: 2018-02-16

## 2018-02-16 PROBLEM — O40.9XX0 POLYHYDRAMNIOS AFFECTING PREGNANCY: Status: RESOLVED | Noted: 2017-10-09 | Resolved: 2018-02-16

## 2018-02-16 PROBLEM — L29.9 PRURITUS OF PREGNANCY IN THIRD TRIMESTER: Status: RESOLVED | Noted: 2017-12-28 | Resolved: 2018-02-16

## 2018-02-16 PROBLEM — B00.9 HERPES SIMPLEX VIRUS TYPE 2 (HSV-2) INFECTION AFFECTING PREGNANCY IN FIRST TRIMESTER, ANTEPARTUM: Status: RESOLVED | Noted: 2017-06-22 | Resolved: 2018-02-16

## 2018-02-16 PROCEDURE — 99999 PR PBB SHADOW E&M-EST. PATIENT-LVL III: CPT | Mod: PBBFAC,,, | Performed by: OBSTETRICS & GYNECOLOGY

## 2018-02-16 PROCEDURE — 99213 OFFICE O/P EST LOW 20 MIN: CPT | Mod: PBBFAC | Performed by: OBSTETRICS & GYNECOLOGY

## 2018-02-16 PROCEDURE — 88175 CYTOPATH C/V AUTO FLUID REDO: CPT

## 2018-02-16 NOTE — PROGRESS NOTES
"CC: Post-partum follow-up    Hesham Palacios is a 27 y.o. female  who presents for post-partum visit.  She is S/P a repeat LTCS with BTL.  She and the baby are doing well.  No pain.  No fever.   No bowel / bladder complaints.    Delivery Date: 2018  Delivery MD: Dr. Georgina Pittman  Gender: male  Birth Weight: 9 pounds 15 ounces  Breast Feeding: YES  Depression: NO  Contraception: bilateral tubal ligation    Pregnancy was complicated by:  Hx of C/S x 2  Desires permanent sterilization  Hx of preelcampsia  HSV-2    /87   Ht 5' 2" (1.575 m)   Wt 81.8 kg (180 lb 5.4 oz)   LMP 2017   BMI 32.98 kg/m²     ROS:  GENERAL: No fever, chills, fatigability.  VULVAR: No pain, no lesions and no itching.  VAGINAL: No relaxation, no itching, no discharge, no abnormal bleeding and no lesions.  ABDOMEN: No abdominal pain. Denies nausea. Denies vomiting. No diarrhea. No constipation  BREAST: Denies pain. No lumps. No discharge.  URINARY: No incontinence, no nocturia, no frequency and no dysuria.  CARDIOVASCULAR: No chest pain. No shortness of breath. No leg cramps.  NEUROLOGICAL: No headaches. No vision changes.    PHYSICAL EXAM:  INCISION: well-healed and intact  ABDOMEN:  Soft, non-tender, non-distended  VULVA:  Normal, no lesions  CERVIX:  Without lesions, polyps or tenderness.  UTERUS:  Normal size, shape, consistency, no mass or tenderness.  ADNEXA:  Normal in size without mass or tenderness    Hesham was seen today for postpartum care.    Diagnoses and all orders for this visit:    Status post repeat low transverse  section with bilateral tubal ligation    Pap smear for cervical cancer screening  -     Liquid-based pap smear, screening    Okay to resume regular activities.  RTC 1 year.    No Follow-up on file.          "

## 2018-05-16 ENCOUNTER — PATIENT MESSAGE (OUTPATIENT)
Dept: OBSTETRICS AND GYNECOLOGY | Facility: CLINIC | Age: 28
End: 2018-05-16

## 2018-05-17 ENCOUNTER — TELEPHONE (OUTPATIENT)
Dept: OBSTETRICS AND GYNECOLOGY | Facility: CLINIC | Age: 28
End: 2018-05-17

## 2018-05-17 NOTE — TELEPHONE ENCOUNTER
Spoke with patient; instructed her to get a reliable fax number and send it on the portal and we'll fax her a release and she can sign it and fax it back to us. At that time, we'll send a letter confirming we prescribed pain med.  Patient verbalized understanding of advice.

## 2018-05-17 NOTE — TELEPHONE ENCOUNTER
----- Message from Vanessa Mixon sent at 2018 11:35 AM CDT -----  Contact: Patient  Patient called to speak with the nurse; she stated she took a hair follicle drug screen and she came back positive for Oxycodone. She did take Percocet when she had her baby. She only has until the end of the day to get something from the doctor. She was able to get her medication list off of her Patient Portal but because her name or  isn't showing on it. This is for a job she is trying to apply for and it's due today. She can be contacted at [phone] [place] or  [phone] cell.    Thanks,  Vanessa

## 2018-11-16 ENCOUNTER — PATIENT MESSAGE (OUTPATIENT)
Dept: ADMINISTRATIVE | Facility: HOSPITAL | Age: 28
End: 2018-11-16

## 2019-02-01 ENCOUNTER — OFFICE VISIT (OUTPATIENT)
Dept: OBSTETRICS AND GYNECOLOGY | Facility: CLINIC | Age: 29
End: 2019-02-01
Payer: MEDICAID

## 2019-02-01 VITALS
DIASTOLIC BLOOD PRESSURE: 70 MMHG | SYSTOLIC BLOOD PRESSURE: 110 MMHG | WEIGHT: 203.69 LBS | HEIGHT: 62 IN | BODY MASS INDEX: 37.48 KG/M2

## 2019-02-01 DIAGNOSIS — B96.89 BV (BACTERIAL VAGINOSIS): Primary | ICD-10-CM

## 2019-02-01 DIAGNOSIS — N76.0 BV (BACTERIAL VAGINOSIS): Primary | ICD-10-CM

## 2019-02-01 PROCEDURE — 99999 PR PBB SHADOW E&M-EST. PATIENT-LVL II: CPT | Mod: PBBFAC,,, | Performed by: OBSTETRICS & GYNECOLOGY

## 2019-02-01 PROCEDURE — 99212 OFFICE O/P EST SF 10 MIN: CPT | Mod: PBBFAC | Performed by: OBSTETRICS & GYNECOLOGY

## 2019-02-01 PROCEDURE — 99999 PR PBB SHADOW E&M-EST. PATIENT-LVL II: ICD-10-PCS | Mod: PBBFAC,,, | Performed by: OBSTETRICS & GYNECOLOGY

## 2019-02-01 PROCEDURE — 87210 SMEAR WET MOUNT SALINE/INK: CPT | Mod: PBBFAC | Performed by: OBSTETRICS & GYNECOLOGY

## 2019-02-01 PROCEDURE — 99212 OFFICE O/P EST SF 10 MIN: CPT | Mod: S$PBB,,, | Performed by: OBSTETRICS & GYNECOLOGY

## 2019-02-01 PROCEDURE — 99212 PR OFFICE/OUTPT VISIT, EST, LEVL II, 10-19 MIN: ICD-10-PCS | Mod: S$PBB,,, | Performed by: OBSTETRICS & GYNECOLOGY

## 2019-02-01 RX ORDER — METRONIDAZOLE 7.5 MG/G
1 GEL VAGINAL DAILY
Qty: 70 G | Refills: 1 | Status: SHIPPED | OUTPATIENT
Start: 2019-02-01 | End: 2019-02-06

## 2019-02-06 ENCOUNTER — TELEPHONE (OUTPATIENT)
Dept: OBSTETRICS AND GYNECOLOGY | Facility: CLINIC | Age: 29
End: 2019-02-06

## 2019-02-06 NOTE — TELEPHONE ENCOUNTER
----- Message from Giselle Keller sent at 2/6/2019  3:39 PM CST -----  Contact: pt [phone]  Pt called requesting apt with Georgina Pittman, pt stated she needs a well woman.  Please contact her at [phone]

## 2019-02-12 ENCOUNTER — TELEPHONE (OUTPATIENT)
Dept: OBSTETRICS AND GYNECOLOGY | Facility: CLINIC | Age: 29
End: 2019-02-12

## 2019-02-12 NOTE — TELEPHONE ENCOUNTER
Pt advised Dr. Pittman is only seeing patients in the morning tomorrow.  Pt stated she will keep appt as scheduled.  DANIELA KELLY

## 2019-02-12 NOTE — TELEPHONE ENCOUNTER
----- Message from Lorri Barrios sent at 2/12/2019  3:31 PM CST -----  Contact: [phone]  Pt is requesting to speak with the nurse to come in for a later time like 1 or 2;30 p.m. If possible on same date      Thank you!

## 2019-02-12 NOTE — TELEPHONE ENCOUNTER
----- Message from Lorri Barrios sent at 2/12/2019  3:31 PM CST -----  Contact: [phone]  Pt is requesting to speak with the nurse to come in for a later time like 1 or 2;30 p.m. If possible on same date      Thank you!

## 2019-02-15 ENCOUNTER — LAB VISIT (OUTPATIENT)
Dept: LAB | Facility: HOSPITAL | Age: 29
End: 2019-02-15
Attending: OBSTETRICS & GYNECOLOGY
Payer: MEDICAID

## 2019-02-15 ENCOUNTER — OFFICE VISIT (OUTPATIENT)
Dept: OBSTETRICS AND GYNECOLOGY | Facility: CLINIC | Age: 29
End: 2019-02-15
Payer: MEDICAID

## 2019-02-15 VITALS
DIASTOLIC BLOOD PRESSURE: 64 MMHG | BODY MASS INDEX: 37.73 KG/M2 | WEIGHT: 205 LBS | HEIGHT: 62 IN | SYSTOLIC BLOOD PRESSURE: 108 MMHG

## 2019-02-15 DIAGNOSIS — Z01.419 WELL WOMAN EXAM WITH ROUTINE GYNECOLOGICAL EXAM: Primary | ICD-10-CM

## 2019-02-15 DIAGNOSIS — Z11.3 SCREENING EXAMINATION FOR STD (SEXUALLY TRANSMITTED DISEASE): ICD-10-CM

## 2019-02-15 PROCEDURE — 36415 COLL VENOUS BLD VENIPUNCTURE: CPT

## 2019-02-15 PROCEDURE — 87340 HEPATITIS B SURFACE AG IA: CPT

## 2019-02-15 PROCEDURE — 99213 OFFICE O/P EST LOW 20 MIN: CPT | Mod: PBBFAC | Performed by: OBSTETRICS & GYNECOLOGY

## 2019-02-15 PROCEDURE — 99395 PREV VISIT EST AGE 18-39: CPT | Mod: 25,S$PBB,, | Performed by: OBSTETRICS & GYNECOLOGY

## 2019-02-15 PROCEDURE — 87210 SMEAR WET MOUNT SALINE/INK: CPT | Mod: PBBFAC | Performed by: OBSTETRICS & GYNECOLOGY

## 2019-02-15 PROCEDURE — 86803 HEPATITIS C AB TEST: CPT

## 2019-02-15 PROCEDURE — 86703 HIV-1/HIV-2 1 RESULT ANTBDY: CPT

## 2019-02-15 PROCEDURE — 99999 PR PBB SHADOW E&M-EST. PATIENT-LVL III: ICD-10-PCS | Mod: PBBFAC,,, | Performed by: OBSTETRICS & GYNECOLOGY

## 2019-02-15 PROCEDURE — 87491 CHLMYD TRACH DNA AMP PROBE: CPT

## 2019-02-15 PROCEDURE — 99395 PR PREVENTIVE VISIT,EST,18-39: ICD-10-PCS | Mod: 25,S$PBB,, | Performed by: OBSTETRICS & GYNECOLOGY

## 2019-02-15 PROCEDURE — 99999 PR PBB SHADOW E&M-EST. PATIENT-LVL III: CPT | Mod: PBBFAC,,, | Performed by: OBSTETRICS & GYNECOLOGY

## 2019-02-15 PROCEDURE — 86592 SYPHILIS TEST NON-TREP QUAL: CPT

## 2019-02-15 NOTE — PROGRESS NOTES
Subjective:       Patient ID: Hesham aPlacios is a 28 y.o. female.    Chief Complaint:  Well Woman      History of Present Illness  HPI  Presents for well-woman exam.  No gyn complaints.  Requests STD screening.  Has had a new partner recently.  Pt had a BTL with her last .  Last pap: 18 negative    GYN & OB History  No LMP recorded.   Date of Last Pap: 2018    OB History    Para Term  AB Living   3 3 3 0 0 3   SAB TAB Ectopic Multiple Live Births   0 0 0 0 3      # Outcome Date GA Lbr Virgilio/2nd Weight Sex Delivery Anes PTL Lv   3 Term 18 39w3d  4.52 kg (9 lb 15.4 oz) M CS-LTranv Spinal N DANIEL   2 Term 16 41w1d  3.94 kg (8 lb 11 oz) M CS-LTranv EPI N DANIEL   1 Term 11/23/10 40w0d  3.771 kg (8 lb 5 oz) M CS-Unspec   DANIEL      Complications: Preeclampsia          Review of Systems  Review of Systems   Constitutional: Negative for activity change, fatigue, fever and unexpected weight change.   Gastrointestinal: Negative for abdominal pain, bloating, constipation, diarrhea, nausea and vomiting.   Endocrine: Negative for hair loss and hot flashes.   Genitourinary: Negative for dysmenorrhea, dyspareunia, dysuria, frequency, genital sores, hematuria, menorrhagia, menstrual problem, pelvic pain, urgency, vaginal bleeding, vaginal discharge, vaginal pain, postcoital bleeding and vaginal odor.   Hematological: Negative for adenopathy.   Breast: Negative for mass, mastodynia, nipple discharge and skin changes          Objective:    Physical Exam:   Constitutional: She is oriented to person, place, and time. She appears well-developed and well-nourished. No distress.      Neck: Neck supple. No thyromegaly present.     Pulmonary/Chest: Right breast exhibits no inverted nipple, no mass, no nipple discharge, no skin change, no tenderness, no bleeding and no swelling. Left breast exhibits no inverted nipple, no mass, no nipple discharge, no skin change, no tenderness, no bleeding and no  swelling. Breasts are symmetrical.        Abdominal: Soft. She exhibits no distension and no mass. There is no tenderness. There is no rebound and no guarding.     Genitourinary: Pelvic exam was performed with patient supine. There is no rash, tenderness, lesion or injury on the right labia. There is no rash, tenderness, lesion or injury on the left labia. Uterus is not deviated, not enlarged, not fixed, not tender, not hosting fibroids and not experiencing uterine prolapse. Cervix is normal. Right adnexum displays no mass, no tenderness and no fullness. Left adnexum displays no mass, no tenderness and no fullness. No erythema, tenderness, bleeding, rectocele or cystocele in the vagina. No foreign body in the vagina. No signs of injury around the vagina. No vaginal discharge found. Cervix exhibits no motion tenderness, no discharge and no friability.        Uterus Size: 6 cm      Lymphadenopathy:        Right: No inguinal adenopathy present.        Left: No inguinal adenopathy present.    Neurological: She is alert and oriented to person, place, and time.     Psychiatric: She has a normal mood and affect.        Wet prep: normal vaginal squamous epithelial cells    Assessment:        1. Well woman exam with routine gynecological exam    2. Screening examination for STD (sexually transmitted disease)                Plan:      Hesham was seen today for well woman.    Diagnoses and all orders for this visit:    Well woman exam with routine gynecological exam    Screening examination for STD (sexually transmitted disease)  -     C. trachomatis/N. gonorrhoeae by AMP DNA  -     HIV 1/2 Ag/Ab (4th Gen); Future  -     RPR; Future  -     Hepatitis B surface antigen; Future  -     Hepatitis C antibody; Future     Next pap due in 2021.  Discussed safe sex practices and proper condom use.  Patient asked about PREP prescription.  Advised her to seek further information about this from the health unit.  RTC 1 year or sooner  prn.

## 2019-02-16 LAB — RPR SER QL: NORMAL

## 2019-02-18 ENCOUNTER — TELEPHONE (OUTPATIENT)
Dept: OBSTETRICS AND GYNECOLOGY | Facility: CLINIC | Age: 29
End: 2019-02-18

## 2019-02-18 LAB
C TRACH DNA SPEC QL NAA+PROBE: NOT DETECTED
HBV SURFACE AG SERPL QL IA: NEGATIVE
HCV AB SERPL QL IA: NEGATIVE
HIV 1+2 AB+HIV1 P24 AG SERPL QL IA: NEGATIVE
N GONORRHOEA DNA SPEC QL NAA+PROBE: NOT DETECTED

## 2019-02-18 NOTE — TELEPHONE ENCOUNTER
----- Message from America Rojas sent at 2/18/2019 10:52 AM CST -----  Contact: Patient  Patient would like a nurse to call her back regarding medications    Callback: [phone]    Thank you

## 2019-02-18 NOTE — TELEPHONE ENCOUNTER
Spoke with pt. Pt requesting proof of rx of percocet. Pt states she was prescribed percocet for her  in 2018, however, took some in 2018, and is now having to do a hair drug screening. Notified pt that a letter could be done stating the rx was prescribed for the set dates, however, could not be written stating she had the rx in November. Pt verbalized understanding. Pt states she is going to bring by a form that has to be filled out to the [facility] location.

## 2019-03-08 NOTE — PROGRESS NOTES
Avoid salt, alcohol, limit caffeine    InSight timer is an judy for a smart phone for meditation.    Recommend journaling or using an judy such as My Fitness Pal.  Weight Watchers, Overeaters Anonymous, TOPS are all options.  Recommend at least 6-12 months.      The new shingle shot, Shingrix, is available in 2018. I would recommend that you check with your insurance company to see if this is covered. This is a 2 shot series. If it is covered recommend you call the office to set up an appointment with my nurse to receive the vaccine.      Please contact your surgeon if you become sick prior to your surgery.    Please make sure that you are clear with your surgical team about the shower prep that you need to potentially go through one to 2 days before surgery to decrease risk of infection.       See report in viewpoint

## 2019-05-29 ENCOUNTER — OFFICE VISIT (OUTPATIENT)
Dept: OBSTETRICS AND GYNECOLOGY | Facility: CLINIC | Age: 29
End: 2019-05-29
Payer: MEDICAID

## 2019-05-29 ENCOUNTER — LAB VISIT (OUTPATIENT)
Dept: LAB | Facility: HOSPITAL | Age: 29
End: 2019-05-29
Attending: NURSE PRACTITIONER
Payer: MEDICAID

## 2019-05-29 VITALS
HEIGHT: 62 IN | BODY MASS INDEX: 36.95 KG/M2 | DIASTOLIC BLOOD PRESSURE: 90 MMHG | WEIGHT: 200.81 LBS | SYSTOLIC BLOOD PRESSURE: 118 MMHG

## 2019-05-29 DIAGNOSIS — N92.6 IRREGULAR MENSES: ICD-10-CM

## 2019-05-29 DIAGNOSIS — N92.6 IRREGULAR MENSES: Primary | ICD-10-CM

## 2019-05-29 DIAGNOSIS — R10.2 PELVIC PAIN IN FEMALE: ICD-10-CM

## 2019-05-29 DIAGNOSIS — B00.9 HSV-2 (HERPES SIMPLEX VIRUS 2) INFECTION: ICD-10-CM

## 2019-05-29 LAB
HCG INTACT+B SERPL-ACNC: <1.2 MIU/ML
INSULIN COLLECTION INTERVAL: NORMAL
INSULIN SERPL-ACNC: 13 UU/ML
PROLACTIN SERPL IA-MCNC: 6.6 NG/ML (ref 5.2–26.5)
TSH SERPL DL<=0.005 MIU/L-ACNC: 0.73 UIU/ML (ref 0.4–4)

## 2019-05-29 PROCEDURE — 83525 ASSAY OF INSULIN: CPT

## 2019-05-29 PROCEDURE — 36415 COLL VENOUS BLD VENIPUNCTURE: CPT

## 2019-05-29 PROCEDURE — 99999 PR PBB SHADOW E&M-EST. PATIENT-LVL III: CPT | Mod: PBBFAC,,, | Performed by: NURSE PRACTITIONER

## 2019-05-29 PROCEDURE — 99999 PR PBB SHADOW E&M-EST. PATIENT-LVL III: ICD-10-PCS | Mod: PBBFAC,,, | Performed by: NURSE PRACTITIONER

## 2019-05-29 PROCEDURE — 99213 PR OFFICE/OUTPT VISIT, EST, LEVL III, 20-29 MIN: ICD-10-PCS | Mod: S$PBB,,, | Performed by: NURSE PRACTITIONER

## 2019-05-29 PROCEDURE — 84702 CHORIONIC GONADOTROPIN TEST: CPT

## 2019-05-29 PROCEDURE — 87491 CHLMYD TRACH DNA AMP PROBE: CPT

## 2019-05-29 PROCEDURE — 99213 OFFICE O/P EST LOW 20 MIN: CPT | Mod: S$PBB,,, | Performed by: NURSE PRACTITIONER

## 2019-05-29 PROCEDURE — 84443 ASSAY THYROID STIM HORMONE: CPT

## 2019-05-29 PROCEDURE — 99213 OFFICE O/P EST LOW 20 MIN: CPT | Mod: PBBFAC | Performed by: NURSE PRACTITIONER

## 2019-05-29 PROCEDURE — 84146 ASSAY OF PROLACTIN: CPT

## 2019-05-29 RX ORDER — VALACYCLOVIR HYDROCHLORIDE 500 MG/1
500 TABLET, FILM COATED ORAL DAILY
Qty: 30 TABLET | Refills: 12 | Status: SHIPPED | OUTPATIENT
Start: 2019-05-29 | End: 2023-08-28

## 2019-05-29 RX ORDER — VALACYCLOVIR HYDROCHLORIDE 1 G/1
1000 TABLET, FILM COATED ORAL 2 TIMES DAILY
Qty: 20 TABLET | Refills: 0 | Status: SHIPPED | OUTPATIENT
Start: 2019-05-29 | End: 2019-06-08

## 2019-05-29 NOTE — PROGRESS NOTES
CC Irregular cycles     Deronniemike Palacios is a 29 y.o. female  presents for irregular cycles for three months. S/P BTL. Mild pelvic pain. Patient reports a HSV II outbreak, no daily meds taken.   Past Medical History:   Diagnosis Date    Anemia     Herpes simplex virus (HSV) infection     Hx of migraine headaches      Past Surgical History:   Procedure Laterality Date     SECTION      x3    CYST REMOVAL Right     Right arm     DELIVERY-CEASAREAN SECTION N/A 2016    Performed [facility] at [facility]    DELIVERY- SECTION WITH TUBAL N/A 2018    Performed by [facility]man MD at [facility] L[facility]D    DILATION AND CURETTAGE OF UTERUS      TUBAL LIGATION       Social History     Socioeconomic History    Marital status:      Spouse name: Not on file    Number of children: Not on file    Years of education: Not on file    Highest education level: Not on file   Occupational History    Not on file   Social Needs    Financial resource strain: Not on file    Food insecurity:     Worry: Not on file     Inability: Not on file    Transportation needs:     Medical: Not on file     Non-medical: Not on file   Tobacco Use    Smoking status: Never Smoker    Smokeless tobacco: Never Used   Substance and Sexual Activity    Alcohol use: Yes     Frequency: Monthly or less     Drinks per session: 1 or 2     Binge frequency: Never     Comment: occ    Drug use: No    Sexual activity: Yes     Partners: Male     Birth control/protection: Condom   Lifestyle    Physical activity:     Days per week: Not on file     Minutes per session: Not on file    Stress: Not on file   Relationships    Social connections:     Talks on phone: Not on file     Gets together: Not on file     Attends Sabianist service: Not on file     Active member of club or organization: Not on file     Attends meetings of clubs or organizations: Not on file     Relationship status: Not on file   Other Topics  "Concern    Not on file   Social History Narrative    Not on file     Family History   Problem Relation Age of Onset    Sleep apnea Father     No Known Problems Mother     Breast cancer Neg Hx     Colon cancer Neg Hx     Ovarian cancer Neg Hx     Uterine cancer Neg Hx     Cervical cancer Neg Hx      OB History        3    Para   3    Term   3       0    AB   0    Living   3       SAB   0    TAB   0    Ectopic   0    Multiple   0    Live Births   3                 BP (!) 118/90   Ht 5' 2" (1.575 m)   Wt 91.1 kg (200 lb 13.4 oz)   LMP 2019   BMI 36.73 kg/m²       ROS:  GENERAL: Denies weight gain or weight loss. Feeling well overall.   ABDOMEN: HPI  URINARY: No frequency, dysuria, hematuria, or burning on urination.  REPRODUCTIVE: See HPI.     PHYSICAL EXAM:  APPEARANCE: Well nourished, well developed, in no acute distress.  ABDOMEN: Soft.  No tenderness or masses.  No hepatosplenomegaly.  No hernias.  PELVIC: Normal external genitalia without lesions.  Vagina moist and well rugated without lesions or discharge.  Cervix pink, without lesions, discharge or tenderness.   Bimanual exam shows uterus to be normal size, regular, mobile and nontender.  Adnexa without masses or tenderness.        1. Irregular menses  TSH    hCG, quantitative    Insulin, random    Prolactin    US Pelvis Complete Non OB   2. HSV-2 (herpes simplex virus 2) infection     3. Pelvic pain in female       PLAN:  Valtrex rx  Pelvic ultrasound   Labs   GC cx  Treatment options for irregular cycles explained             "

## 2019-05-30 LAB
C TRACH DNA SPEC QL NAA+PROBE: NOT DETECTED
N GONORRHOEA DNA SPEC QL NAA+PROBE: NOT DETECTED

## 2019-05-30 NOTE — PROGRESS NOTES
Please call patient and inform her that all labs are normal Does she want to consider treatment for irregular cycles ( birth control).

## 2019-11-19 ENCOUNTER — TELEPHONE (OUTPATIENT)
Dept: OBSTETRICS AND GYNECOLOGY | Facility: CLINIC | Age: 29
End: 2019-11-19

## 2019-11-19 NOTE — TELEPHONE ENCOUNTER
----- Message from Michael Cornell sent at 11/19/2019  9:05 AM CST -----  Contact: Pt  Pt called to make an appt but there are no appts available in the system until 5/6/20    Pt believes she has a UTI or a bladder infection    Pt can be reached at [phone]

## 2019-11-19 NOTE — TELEPHONE ENCOUNTER
Pt states that she has a UTI and would like to come in for an appointment. Scheduled pt to see Meghana Messer NP. Pt verbalized understanding.

## 2019-11-20 ENCOUNTER — OFFICE VISIT (OUTPATIENT)
Dept: URGENT CARE | Facility: CLINIC | Age: 29
End: 2019-11-20
Payer: MEDICAID

## 2019-11-20 VITALS
HEIGHT: 62 IN | RESPIRATION RATE: 16 BRPM | WEIGHT: 200 LBS | DIASTOLIC BLOOD PRESSURE: 81 MMHG | TEMPERATURE: 98 F | BODY MASS INDEX: 36.8 KG/M2 | SYSTOLIC BLOOD PRESSURE: 134 MMHG | OXYGEN SATURATION: 99 % | HEART RATE: 89 BPM

## 2019-11-20 DIAGNOSIS — R05.9 COUGH: ICD-10-CM

## 2019-11-20 DIAGNOSIS — B96.89 ACUTE BACTERIAL RHINOSINUSITIS: Primary | ICD-10-CM

## 2019-11-20 DIAGNOSIS — J01.90 ACUTE BACTERIAL RHINOSINUSITIS: Primary | ICD-10-CM

## 2019-11-20 DIAGNOSIS — J02.9 SORE THROAT: ICD-10-CM

## 2019-11-20 DIAGNOSIS — H10.13 ALLERGIC CONJUNCTIVITIS OF BOTH EYES: ICD-10-CM

## 2019-11-20 LAB
CTP QC/QA: YES
CTP QC/QA: YES
FLUAV AG NPH QL: NEGATIVE
FLUBV AG NPH QL: NEGATIVE
S PYO RRNA THROAT QL PROBE: NEGATIVE

## 2019-11-20 PROCEDURE — 87804 POCT INFLUENZA A/B: ICD-10-PCS | Mod: QW,S$GLB,, | Performed by: NURSE PRACTITIONER

## 2019-11-20 PROCEDURE — 99204 PR OFFICE/OUTPT VISIT, NEW, LEVL IV, 45-59 MIN: ICD-10-PCS | Mod: S$GLB,,, | Performed by: NURSE PRACTITIONER

## 2019-11-20 PROCEDURE — 99204 OFFICE O/P NEW MOD 45 MIN: CPT | Mod: S$GLB,,, | Performed by: NURSE PRACTITIONER

## 2019-11-20 PROCEDURE — 87880 STREP A ASSAY W/OPTIC: CPT | Mod: QW,S$GLB,, | Performed by: NURSE PRACTITIONER

## 2019-11-20 PROCEDURE — 87804 INFLUENZA ASSAY W/OPTIC: CPT | Mod: QW,S$GLB,, | Performed by: NURSE PRACTITIONER

## 2019-11-20 PROCEDURE — 87880 POCT RAPID STREP A: ICD-10-PCS | Mod: QW,S$GLB,, | Performed by: NURSE PRACTITIONER

## 2019-11-20 RX ORDER — OLOPATADINE HYDROCHLORIDE 2 MG/ML
1 SOLUTION/ DROPS OPHTHALMIC DAILY
Qty: 2.5 ML | Refills: 0 | Status: SHIPPED | OUTPATIENT
Start: 2019-11-20 | End: 2023-08-28

## 2019-11-20 RX ORDER — AMOXICILLIN AND CLAVULANATE POTASSIUM 875; 125 MG/1; MG/1
1 TABLET, FILM COATED ORAL 2 TIMES DAILY
Qty: 10 TABLET | Refills: 0 | Status: SHIPPED | OUTPATIENT
Start: 2019-11-20 | End: 2019-11-25

## 2019-11-20 NOTE — PATIENT INSTRUCTIONS
· Rest and increase fluids.   · May apply warm compresses as needed.   · Saline nasal spray or saline irrigation (Neti pot) to loosen nasal congestion.  · Flonase or Nasacort to reduce inflammation in the sinus cavities.  · Take antibiotics exactly as prescribed. Make sure to complete the entire course of antibiotics even if you start feeling better. This will prevent recurrence of your infection and bacterial resistance.   · Do not drive, drink alcohol, or take any other sedating medications or substances while taking cough syrup.   · Follow up with your primary care provider or with ENT if not improved within a few days or sooner for any new or worsening symptoms.   · Go to the ER for any fever that does not improve with Tylenol/Ibuprofen, neck stiffness, rash, severe headache, vision changes, shortness of breath, chest pain, severe facial pain or swelling, or for any other new and concerning symptoms.   For cough:  Stop smoking/avoid smoke.  Increase fluids.  Warm liquids may help.  Rest.  Cool mist humidifier may help. Place humidifier in close proximity to you and avoid using a ceiling fan while humidifier is in use.  Follow up in clinic if not improving in 1 week, sooner if worse.  To ER with difficulty breathing or shortness of breath at rest.  Take medications as directed. If your cough medicine contains promethazine or codeine, do not drive and avoid using alcohol or any other potentially sedating medications or substances while taking cough suppressant.

## 2019-11-20 NOTE — PROGRESS NOTES
"Subjective:       Patient ID: Hesham Palacios is a 29 y.o. female.    Vitals:  height is 5' 2" (1.575 m) and weight is 90.7 kg (200 lb). Her oral temperature is 98.1 °F (36.7 °C). Her blood pressure is 134/81 and her pulse is 89. Her respiration is 16 and oxygen saturation is 99%.     Chief Complaint: Conjunctivitis and URI    URI/ x 4 days   Also poss pink eye, no fever, has taken Mucinex no relief    Conjunctivitis   This is a new problem. The current episode started in the past 7 days. The problem has been waxing and waning. Associated symptoms include chills, congestion, coughing, fatigue, headaches, nausea and a sore throat. Pertinent negatives include no diaphoresis, fever, myalgias, rash or vomiting. Treatments tried: Mucinex. The treatment provided mild relief.   URI    This is a new problem. The current episode started 1 to 4 weeks ago. The problem has been gradually worsening. There has been no fever (didn't take temp). Associated symptoms include congestion, coughing, ear pain, headaches, nausea, rhinorrhea, sneezing and a sore throat. Pertinent negatives include no plugged ear sensation, rash, sinus pain, vomiting or wheezing. Treatments tried: mucinex. The treatment provided mild relief.   Pt states she was sick all last week and thought she was getting better than began to feel much worse a few days ago.    Constitution: Positive for chills and fatigue. Negative for sweating and fever.   HENT: Positive for ear pain, congestion and sore throat. Negative for sinus pain, sinus pressure and voice change.    Neck: Negative for painful lymph nodes.   Eyes: Positive for eye redness.   Respiratory: Positive for cough and sputum production. Negative for chest tightness, bloody sputum, COPD, shortness of breath, stridor, wheezing and asthma.    Gastrointestinal: Positive for nausea. Negative for vomiting.   Musculoskeletal: Negative for muscle ache.   Skin: Negative for rash.   Allergic/Immunologic: Positive " for sneezing. Negative for seasonal allergies and asthma.   Neurological: Positive for headaches.   Hematologic/Lymphatic: Negative for swollen lymph nodes.       Objective:      Physical Exam   Constitutional: She is oriented to person, place, and time. She appears well-developed and well-nourished. She is cooperative.  Non-toxic appearance. She does not have a sickly appearance. She does not appear ill. No distress.   HENT:   Head: Normocephalic and atraumatic.   Right Ear: Hearing, external ear and ear canal normal. No swelling or tenderness. Tympanic membrane is not erythematous. A middle ear effusion is present.   Left Ear: Hearing, external ear and ear canal normal. No swelling or tenderness. Tympanic membrane is not erythematous. A middle ear effusion is present.   Nose: Mucosal edema and rhinorrhea present. No nasal deformity. No epistaxis. Right sinus exhibits no maxillary sinus tenderness and no frontal sinus tenderness. Left sinus exhibits no maxillary sinus tenderness and no frontal sinus tenderness.   Mouth/Throat: Uvula is midline, oropharynx is clear and moist and mucous membranes are normal. No trismus in the jaw. Normal dentition. No uvula swelling. Cobblestoning present. No oropharyngeal exudate, posterior oropharyngeal edema or posterior oropharyngeal erythema.   Eyes: Lids are normal. Right conjunctiva is injected (more than left). Left conjunctiva is injected. No scleral icterus.   Neck: Trachea normal, full passive range of motion without pain and phonation normal. Neck supple. No neck rigidity. No edema and no erythema present.   Cardiovascular: Normal rate, regular rhythm, normal heart sounds, intact distal pulses and normal pulses.   Pulmonary/Chest: Effort normal and breath sounds normal. No respiratory distress. She has no decreased breath sounds. She has no rhonchi.   Frequent cough heard on exam.   Abdominal: Normal appearance.   Musculoskeletal: Normal range of motion. She exhibits no  edema or deformity.   Neurological: She is alert and oriented to person, place, and time. She exhibits normal muscle tone. Coordination normal.   Skin: Skin is warm, dry, intact, not diaphoretic and not pale.   Psychiatric: She has a normal mood and affect. Her speech is normal and behavior is normal. Judgment and thought content normal. Cognition and memory are normal.   Nursing note and vitals reviewed.        Assessment:       1. Acute bacterial rhinosinusitis    2. Allergic conjunctivitis of both eyes    3. Sore throat    4. Cough        Plan:         Acute bacterial rhinosinusitis  -     amoxicillin-clavulanate 875-125mg (AUGMENTIN) 875-125 mg per tablet; Take 1 tablet by mouth 2 (two) times daily. for 5 days  Dispense: 10 tablet; Refill: 0    Allergic conjunctivitis of both eyes  -     olopatadine (PATADAY) 0.2 % Drop; Place 1 drop into both eyes once daily.  Dispense: 2.5 mL; Refill: 0    Sore throat  -     POCT rapid strep A    Cough  -     POCT Influenza A/B          Rapid flu swab is negative.  Rapid strep swab is negative.  · Rest and increase fluids.   · May apply warm compresses as needed.   · Saline nasal spray or saline irrigation (Neti pot) to loosen nasal congestion.  · Flonase or Nasacort to reduce inflammation in the sinus cavities.  · Take antibiotics exactly as prescribed. Make sure to complete the entire course of antibiotics even if you start feeling better. This will prevent recurrence of your infection and bacterial resistance.   · Do not drive, drink alcohol, or take any other sedating medications or substances while taking cough syrup.   · Follow up with your primary care provider or with ENT if not improved within a few days or sooner for any new or worsening symptoms.   · Go to the ER for any fever that does not improve with Tylenol/Ibuprofen, neck stiffness, rash, severe headache, vision changes, shortness of breath, chest pain, severe facial pain or swelling, or for any other new and  concerning symptoms.   For cough:  Stop smoking/avoid smoke.  Increase fluids.  Warm liquids may help.  Rest.  Cool mist humidifier may help. Place humidifier in close proximity to you and avoid using a ceiling fan while humidifier is in use.  Follow up in clinic if not improving in 1 week, sooner if worse.  To ER with difficulty breathing or shortness of breath at rest.  Take medications as directed. If your cough medicine contains promethazine or codeine, do not drive and avoid using alcohol or any other potentially sedating medications or substances while taking cough suppressant.

## 2019-11-20 NOTE — LETTER
November 20, 2019      Chava Reid  [facility]  [address], [address]  [place] [address]  Phone: [phone]       Patient: Hesham Palacios   YOB: 1990  Date of Visit: 11/20/2019    To Whom It May Concern:    Anselmo Palacios  was at [facility] on 11/20/2019. She may return to work/school on 11/21/2019 with no restrictions. If you have any questions or concerns, or if I can be of further assistance, please do not hesitate to contact me.    Sincerely,      Carl Walker, NP

## 2021-01-27 ENCOUNTER — OFFICE VISIT (OUTPATIENT)
Dept: OBSTETRICS AND GYNECOLOGY | Facility: CLINIC | Age: 31
End: 2021-01-27
Payer: MEDICAID

## 2021-01-27 VITALS
SYSTOLIC BLOOD PRESSURE: 110 MMHG | HEIGHT: 62 IN | DIASTOLIC BLOOD PRESSURE: 72 MMHG | WEIGHT: 196 LBS | BODY MASS INDEX: 36.07 KG/M2

## 2021-01-27 DIAGNOSIS — L91.8 SKIN TAG: ICD-10-CM

## 2021-01-27 DIAGNOSIS — Z01.419 ENCOUNTER FOR PAPANICOLAOU SMEAR OF VAGINA AS PART OF ROUTINE GYNECOLOGICAL EXAMINATION: ICD-10-CM

## 2021-01-27 DIAGNOSIS — Z12.72 ENCOUNTER FOR PAPANICOLAOU SMEAR OF VAGINA AS PART OF ROUTINE GYNECOLOGICAL EXAMINATION: ICD-10-CM

## 2021-01-27 DIAGNOSIS — Z00.00 PREVENTATIVE HEALTH CARE: Primary | ICD-10-CM

## 2021-01-27 PROCEDURE — 88175 CYTOPATH C/V AUTO FLUID REDO: CPT

## 2021-01-27 PROCEDURE — 99213 OFFICE O/P EST LOW 20 MIN: CPT | Mod: PBBFAC | Performed by: NURSE PRACTITIONER

## 2021-01-27 PROCEDURE — 88305 TISSUE EXAM BY PATHOLOGIST: CPT | Performed by: PATHOLOGY

## 2021-01-27 PROCEDURE — 88305 TISSUE EXAM BY PATHOLOGIST: CPT | Mod: 26,,, | Performed by: PATHOLOGY

## 2021-01-27 PROCEDURE — 99395 PREV VISIT EST AGE 18-39: CPT | Mod: S$PBB,,, | Performed by: NURSE PRACTITIONER

## 2021-01-27 PROCEDURE — 99999 PR PBB SHADOW E&M-EST. PATIENT-LVL III: ICD-10-PCS | Mod: PBBFAC,,, | Performed by: NURSE PRACTITIONER

## 2021-01-27 PROCEDURE — 99395 PR PREVENTIVE VISIT,EST,18-39: ICD-10-PCS | Mod: S$PBB,,, | Performed by: NURSE PRACTITIONER

## 2021-01-27 PROCEDURE — 99999 PR PBB SHADOW E&M-EST. PATIENT-LVL III: CPT | Mod: PBBFAC,,, | Performed by: NURSE PRACTITIONER

## 2021-01-27 PROCEDURE — 88305 TISSUE EXAM BY PATHOLOGIST: ICD-10-PCS | Mod: 26,,, | Performed by: PATHOLOGY

## 2021-01-29 ENCOUNTER — PATIENT MESSAGE (OUTPATIENT)
Dept: OBSTETRICS AND GYNECOLOGY | Facility: CLINIC | Age: 31
End: 2021-01-29

## 2021-01-29 DIAGNOSIS — B35.1 TOENAIL FUNGUS: Primary | ICD-10-CM

## 2021-01-29 DIAGNOSIS — F41.9 ANXIETY: ICD-10-CM

## 2021-02-01 ENCOUNTER — LAB VISIT (OUTPATIENT)
Dept: LAB | Facility: HOSPITAL | Age: 31
End: 2021-02-01
Attending: PODIATRIST
Payer: MEDICAID

## 2021-02-01 ENCOUNTER — OFFICE VISIT (OUTPATIENT)
Dept: PODIATRY | Facility: CLINIC | Age: 31
End: 2021-02-01
Payer: MEDICAID

## 2021-02-01 VITALS
HEIGHT: 62 IN | SYSTOLIC BLOOD PRESSURE: 125 MMHG | WEIGHT: 196 LBS | BODY MASS INDEX: 36.07 KG/M2 | DIASTOLIC BLOOD PRESSURE: 87 MMHG | HEART RATE: 78 BPM

## 2021-02-01 DIAGNOSIS — B35.3 TINEA PEDIS OF BOTH FEET: Primary | ICD-10-CM

## 2021-02-01 DIAGNOSIS — B35.3 TINEA PEDIS OF BOTH FEET: ICD-10-CM

## 2021-02-01 DIAGNOSIS — B35.1 TOENAIL FUNGUS: ICD-10-CM

## 2021-02-01 LAB
FINAL PATHOLOGIC DIAGNOSIS: NORMAL
GROSS: NORMAL

## 2021-02-01 PROCEDURE — 99204 OFFICE O/P NEW MOD 45 MIN: CPT | Mod: S$PBB,,, | Performed by: PODIATRIST

## 2021-02-01 PROCEDURE — 99999 PR PBB SHADOW E&M-EST. PATIENT-LVL III: CPT | Mod: PBBFAC,,, | Performed by: PODIATRIST

## 2021-02-01 PROCEDURE — 36415 COLL VENOUS BLD VENIPUNCTURE: CPT

## 2021-02-01 PROCEDURE — 99213 OFFICE O/P EST LOW 20 MIN: CPT | Mod: PBBFAC | Performed by: PODIATRIST

## 2021-02-01 PROCEDURE — 80076 HEPATIC FUNCTION PANEL: CPT

## 2021-02-01 PROCEDURE — 99999 PR PBB SHADOW E&M-EST. PATIENT-LVL III: ICD-10-PCS | Mod: PBBFAC,,, | Performed by: PODIATRIST

## 2021-02-01 PROCEDURE — 99204 PR OFFICE/OUTPT VISIT, NEW, LEVL IV, 45-59 MIN: ICD-10-PCS | Mod: S$PBB,,, | Performed by: PODIATRIST

## 2021-02-01 RX ORDER — NAFTIFINE HYDROCHLORIDE 2 G/100G
1 GEL TOPICAL 2 TIMES DAILY
Qty: 1 TUBE | Refills: 1 | Status: SHIPPED | OUTPATIENT
Start: 2021-02-01 | End: 2023-08-28

## 2021-02-01 RX ORDER — CICLOPIROX 80 MG/ML
SOLUTION TOPICAL
Qty: 1 BOTTLE | Refills: 10 | Status: SHIPPED | OUTPATIENT
Start: 2021-02-01 | End: 2023-03-30

## 2021-02-02 ENCOUNTER — PATIENT MESSAGE (OUTPATIENT)
Dept: PODIATRY | Facility: CLINIC | Age: 31
End: 2021-02-02

## 2021-02-02 LAB
ALBUMIN SERPL BCP-MCNC: 3.8 G/DL (ref 3.5–5.2)
ALP SERPL-CCNC: 38 U/L (ref 55–135)
ALT SERPL W/O P-5'-P-CCNC: 15 U/L (ref 10–44)
AST SERPL-CCNC: 18 U/L (ref 10–40)
BILIRUB DIRECT SERPL-MCNC: 0.2 MG/DL (ref 0.1–0.3)
BILIRUB SERPL-MCNC: 0.3 MG/DL (ref 0.1–1)
PROT SERPL-MCNC: 6.9 G/DL (ref 6–8.4)

## 2021-02-08 ENCOUNTER — PATIENT MESSAGE (OUTPATIENT)
Dept: PODIATRY | Facility: CLINIC | Age: 31
End: 2021-02-08

## 2021-02-08 ENCOUNTER — OFFICE VISIT (OUTPATIENT)
Dept: PSYCHIATRY | Facility: CLINIC | Age: 31
End: 2021-02-08
Payer: MEDICAID

## 2021-02-08 DIAGNOSIS — F33.1 MODERATE EPISODE OF RECURRENT MAJOR DEPRESSIVE DISORDER: Primary | ICD-10-CM

## 2021-02-08 DIAGNOSIS — F41.9 ANXIETY: ICD-10-CM

## 2021-02-08 PROCEDURE — 90837 PSYTX W PT 60 MINUTES: CPT | Mod: S$PBB,AJ,HB, | Performed by: SOCIAL WORKER

## 2021-02-08 PROCEDURE — 90837 PR PSYCHOTHERAPY W/PATIENT, 60 MIN: ICD-10-PCS | Mod: S$PBB,AJ,HB, | Performed by: SOCIAL WORKER

## 2021-02-08 PROCEDURE — 99999 PR PBB SHADOW E&M-EST. PATIENT-LVL I: CPT | Mod: PBBFAC,AJ,HB, | Performed by: SOCIAL WORKER

## 2021-02-08 PROCEDURE — 99211 OFF/OP EST MAY X REQ PHY/QHP: CPT | Mod: PBBFAC,25 | Performed by: SOCIAL WORKER

## 2021-02-08 PROCEDURE — 90837 PSYTX W PT 60 MINUTES: CPT | Mod: PBBFAC | Performed by: SOCIAL WORKER

## 2021-02-08 PROCEDURE — 99999 PR PBB SHADOW E&M-EST. PATIENT-LVL I: ICD-10-PCS | Mod: PBBFAC,AJ,HB, | Performed by: SOCIAL WORKER

## 2021-02-10 LAB
FINAL PATHOLOGIC DIAGNOSIS: NORMAL
Lab: NORMAL

## 2021-02-18 ENCOUNTER — PATIENT MESSAGE (OUTPATIENT)
Dept: PODIATRY | Facility: CLINIC | Age: 31
End: 2021-02-18

## 2021-02-18 RX ORDER — TERBINAFINE HYDROCHLORIDE 250 MG/1
250 TABLET ORAL DAILY
Qty: 90 TABLET | Refills: 0 | Status: SHIPPED | OUTPATIENT
Start: 2021-02-18 | End: 2023-08-28

## 2021-02-22 ENCOUNTER — PATIENT MESSAGE (OUTPATIENT)
Dept: PSYCHIATRY | Facility: CLINIC | Age: 31
End: 2021-02-22

## 2021-02-24 ENCOUNTER — OFFICE VISIT (OUTPATIENT)
Dept: PSYCHIATRY | Facility: CLINIC | Age: 31
End: 2021-02-24
Payer: MEDICAID

## 2021-02-24 VITALS
HEART RATE: 71 BPM | WEIGHT: 195.19 LBS | DIASTOLIC BLOOD PRESSURE: 96 MMHG | SYSTOLIC BLOOD PRESSURE: 134 MMHG | BODY MASS INDEX: 35.71 KG/M2

## 2021-02-24 DIAGNOSIS — F43.10 PTSD (POST-TRAUMATIC STRESS DISORDER): ICD-10-CM

## 2021-02-24 DIAGNOSIS — F41.1 GAD (GENERALIZED ANXIETY DISORDER): ICD-10-CM

## 2021-02-24 DIAGNOSIS — F33.1 MODERATE EPISODE OF RECURRENT MAJOR DEPRESSIVE DISORDER: ICD-10-CM

## 2021-02-24 DIAGNOSIS — F41.9 ANXIETY: ICD-10-CM

## 2021-02-24 PROCEDURE — 99999 PR PBB SHADOW E&M-EST. PATIENT-LVL II: ICD-10-PCS | Mod: PBBFAC,SA,HB, | Performed by: NURSE PRACTITIONER

## 2021-02-24 PROCEDURE — 99417 PR PROLONGED SVC, OUTPT, W/WO DIRECT PT CONTACT,  EA ADDTL 15 MIN: ICD-10-PCS | Mod: SA,HB,S$PBB, | Performed by: NURSE PRACTITIONER

## 2021-02-24 PROCEDURE — 99417 PROLNG OP E/M EACH 15 MIN: CPT | Mod: SA,HB,S$PBB, | Performed by: NURSE PRACTITIONER

## 2021-02-24 PROCEDURE — 99215 OFFICE O/P EST HI 40 MIN: CPT | Mod: SA,HB,S$PBB, | Performed by: NURSE PRACTITIONER

## 2021-02-24 PROCEDURE — 99212 OFFICE O/P EST SF 10 MIN: CPT | Mod: PBBFAC | Performed by: NURSE PRACTITIONER

## 2021-02-24 PROCEDURE — 99215 PR OFFICE/OUTPT VISIT, EST, LEVL V, 40-54 MIN: ICD-10-PCS | Mod: SA,HB,S$PBB, | Performed by: NURSE PRACTITIONER

## 2021-02-24 PROCEDURE — 99999 PR PBB SHADOW E&M-EST. PATIENT-LVL II: CPT | Mod: PBBFAC,SA,HB, | Performed by: NURSE PRACTITIONER

## 2021-02-24 RX ORDER — ESCITALOPRAM OXALATE 10 MG/1
10 TABLET ORAL DAILY
Qty: 30 TABLET | Refills: 0 | Status: SHIPPED | OUTPATIENT
Start: 2021-02-24 | End: 2021-04-08

## 2021-02-24 RX ORDER — HYDROXYZINE HYDROCHLORIDE 10 MG/1
10 TABLET, FILM COATED ORAL 3 TIMES DAILY PRN
Qty: 42 TABLET | Refills: 0 | Status: SHIPPED | OUTPATIENT
Start: 2021-02-24 | End: 2021-03-10

## 2021-03-09 ENCOUNTER — PATIENT MESSAGE (OUTPATIENT)
Dept: PSYCHIATRY | Facility: CLINIC | Age: 31
End: 2021-03-09

## 2021-03-16 ENCOUNTER — PATIENT MESSAGE (OUTPATIENT)
Dept: PSYCHIATRY | Facility: CLINIC | Age: 31
End: 2021-03-16

## 2021-03-17 ENCOUNTER — TELEPHONE (OUTPATIENT)
Dept: PSYCHIATRY | Facility: CLINIC | Age: 31
End: 2021-03-17

## 2021-04-08 ENCOUNTER — OFFICE VISIT (OUTPATIENT)
Dept: PSYCHIATRY | Facility: CLINIC | Age: 31
End: 2021-04-08
Payer: MEDICAID

## 2021-04-08 DIAGNOSIS — F41.1 GAD (GENERALIZED ANXIETY DISORDER): ICD-10-CM

## 2021-04-08 DIAGNOSIS — F43.10 PTSD (POST-TRAUMATIC STRESS DISORDER): ICD-10-CM

## 2021-04-08 DIAGNOSIS — F39 MOOD DISORDER: ICD-10-CM

## 2021-04-08 DIAGNOSIS — F51.05 INSOMNIA DUE TO MENTAL CONDITION: ICD-10-CM

## 2021-04-08 DIAGNOSIS — F33.1 MODERATE EPISODE OF RECURRENT MAJOR DEPRESSIVE DISORDER: ICD-10-CM

## 2021-04-08 PROCEDURE — 99214 PR OFFICE/OUTPT VISIT, EST, LEVL IV, 30-39 MIN: ICD-10-PCS | Mod: S$PBB,SA,HB, | Performed by: NURSE PRACTITIONER

## 2021-04-08 PROCEDURE — 99214 OFFICE O/P EST MOD 30 MIN: CPT | Mod: S$PBB,SA,HB, | Performed by: NURSE PRACTITIONER

## 2021-04-08 RX ORDER — HYDROXYZINE HYDROCHLORIDE 10 MG/1
10 TABLET, FILM COATED ORAL EVERY 8 HOURS PRN
Qty: 90 TABLET | Refills: 0 | Status: SHIPPED | OUTPATIENT
Start: 2021-04-08 | End: 2021-05-05

## 2021-04-08 RX ORDER — HYDROXYZINE PAMOATE 50 MG/1
50 CAPSULE ORAL NIGHTLY PRN
Qty: 30 CAPSULE | Refills: 0 | Status: SHIPPED | OUTPATIENT
Start: 2021-04-08 | End: 2021-05-05 | Stop reason: SDUPTHER

## 2021-04-08 RX ORDER — OXCARBAZEPINE 300 MG/1
300 TABLET, FILM COATED ORAL 2 TIMES DAILY
Qty: 46 TABLET | Refills: 0 | Status: SHIPPED | OUTPATIENT
Start: 2021-04-16 | End: 2021-05-05

## 2021-04-08 RX ORDER — OXCARBAZEPINE 150 MG/1
150 TABLET, FILM COATED ORAL 2 TIMES DAILY
Qty: 14 TABLET | Refills: 0 | Status: SHIPPED | OUTPATIENT
Start: 2021-04-08 | End: 2021-04-15

## 2021-04-15 ENCOUNTER — PATIENT MESSAGE (OUTPATIENT)
Dept: PSYCHIATRY | Facility: CLINIC | Age: 31
End: 2021-04-15

## 2021-04-16 ENCOUNTER — TELEPHONE (OUTPATIENT)
Dept: PSYCHIATRY | Facility: CLINIC | Age: 31
End: 2021-04-16

## 2021-04-21 ENCOUNTER — PATIENT MESSAGE (OUTPATIENT)
Dept: PSYCHIATRY | Facility: CLINIC | Age: 31
End: 2021-04-21

## 2021-04-24 PROBLEM — F43.10 PTSD (POST-TRAUMATIC STRESS DISORDER): Status: ACTIVE | Noted: 2021-04-24

## 2021-04-24 PROBLEM — F33.1 MODERATE EPISODE OF RECURRENT MAJOR DEPRESSIVE DISORDER: Status: ACTIVE | Noted: 2021-04-24

## 2021-04-24 PROBLEM — F41.1 GAD (GENERALIZED ANXIETY DISORDER): Status: ACTIVE | Noted: 2021-04-24

## 2021-05-05 ENCOUNTER — OFFICE VISIT (OUTPATIENT)
Dept: PSYCHIATRY | Facility: CLINIC | Age: 31
End: 2021-05-05
Payer: MEDICAID

## 2021-05-05 ENCOUNTER — HOSPITAL ENCOUNTER (OUTPATIENT)
Dept: CARDIOLOGY | Facility: HOSPITAL | Age: 31
Discharge: HOME | End: 2021-05-05
Payer: MEDICAID

## 2021-05-05 VITALS
WEIGHT: 192.69 LBS | SYSTOLIC BLOOD PRESSURE: 120 MMHG | BODY MASS INDEX: 35.24 KG/M2 | DIASTOLIC BLOOD PRESSURE: 82 MMHG | HEART RATE: 81 BPM

## 2021-05-05 DIAGNOSIS — Z79.899 MEDICATION MANAGEMENT: ICD-10-CM

## 2021-05-05 DIAGNOSIS — F41.1 GAD (GENERALIZED ANXIETY DISORDER): ICD-10-CM

## 2021-05-05 DIAGNOSIS — F51.05 INSOMNIA DUE TO MENTAL CONDITION: ICD-10-CM

## 2021-05-05 DIAGNOSIS — F33.1 MODERATE EPISODE OF RECURRENT MAJOR DEPRESSIVE DISORDER: ICD-10-CM

## 2021-05-05 DIAGNOSIS — F39 MOOD DISORDER: ICD-10-CM

## 2021-05-05 DIAGNOSIS — F43.10 PTSD (POST-TRAUMATIC STRESS DISORDER): ICD-10-CM

## 2021-05-05 DIAGNOSIS — F41.9 ANXIETY DISORDER, UNSPECIFIED TYPE: ICD-10-CM

## 2021-05-05 PROCEDURE — 99999 PR PBB SHADOW E&M-EST. PATIENT-LVL I: CPT | Mod: PBBFAC,SA,HB, | Performed by: NURSE PRACTITIONER

## 2021-05-05 PROCEDURE — 99214 OFFICE O/P EST MOD 30 MIN: CPT | Mod: SA,HB,S$PBB, | Performed by: NURSE PRACTITIONER

## 2021-05-05 PROCEDURE — 99999 PR PBB SHADOW E&M-EST. PATIENT-LVL I: ICD-10-PCS | Mod: PBBFAC,SA,HB, | Performed by: NURSE PRACTITIONER

## 2021-05-05 PROCEDURE — 99211 OFF/OP EST MAY X REQ PHY/QHP: CPT | Mod: PBBFAC,25 | Performed by: NURSE PRACTITIONER

## 2021-05-05 PROCEDURE — 93005 ELECTROCARDIOGRAM TRACING: CPT

## 2021-05-05 PROCEDURE — 93010 ELECTROCARDIOGRAM REPORT: CPT | Mod: ,,, | Performed by: INTERNAL MEDICINE

## 2021-05-05 PROCEDURE — 93010 EKG 12-LEAD: ICD-10-PCS | Mod: ,,, | Performed by: INTERNAL MEDICINE

## 2021-05-05 PROCEDURE — 99214 PR OFFICE/OUTPT VISIT, EST, LEVL IV, 30-39 MIN: ICD-10-PCS | Mod: SA,HB,S$PBB, | Performed by: NURSE PRACTITIONER

## 2021-05-05 RX ORDER — HYDROXYZINE HYDROCHLORIDE 10 MG/1
10 TABLET, FILM COATED ORAL EVERY 12 HOURS PRN
Qty: 60 TABLET | Refills: 0 | Status: SHIPPED | OUTPATIENT
Start: 2021-05-05 | End: 2021-07-04

## 2021-05-05 RX ORDER — LISDEXAMFETAMINE DIMESYLATE CAPSULES 20 MG/1
20 CAPSULE ORAL EVERY MORNING
Qty: 30 CAPSULE | Refills: 0 | Status: SHIPPED | OUTPATIENT
Start: 2021-05-05 | End: 2021-06-02

## 2021-05-05 RX ORDER — HYDROXYZINE PAMOATE 50 MG/1
50 CAPSULE ORAL NIGHTLY PRN
Qty: 30 CAPSULE | Refills: 0 | Status: SHIPPED | OUTPATIENT
Start: 2021-05-05 | End: 2021-06-04

## 2021-05-05 RX ORDER — OXCARBAZEPINE 300 MG/1
300 TABLET, FILM COATED ORAL EVERY MORNING
Qty: 30 TABLET | Refills: 0 | Status: SHIPPED | OUTPATIENT
Start: 2021-05-05 | End: 2021-06-04

## 2021-05-05 RX ORDER — OXCARBAZEPINE 600 MG/1
600 TABLET, FILM COATED ORAL NIGHTLY
Qty: 30 TABLET | Refills: 0 | Status: SHIPPED | OUTPATIENT
Start: 2021-05-05 | End: 2021-07-14 | Stop reason: SDUPTHER

## 2021-05-06 ENCOUNTER — OFFICE VISIT (OUTPATIENT)
Dept: PSYCHIATRY | Facility: CLINIC | Age: 31
End: 2021-05-06
Payer: MEDICAID

## 2021-05-06 DIAGNOSIS — F43.10 PTSD (POST-TRAUMATIC STRESS DISORDER): ICD-10-CM

## 2021-05-06 DIAGNOSIS — F33.1 MODERATE EPISODE OF RECURRENT MAJOR DEPRESSIVE DISORDER: ICD-10-CM

## 2021-05-06 DIAGNOSIS — F41.1 GAD (GENERALIZED ANXIETY DISORDER): Primary | ICD-10-CM

## 2021-05-06 PROCEDURE — 90834 PSYTX W PT 45 MINUTES: CPT | Mod: AJ,HB,, | Performed by: SOCIAL WORKER

## 2021-05-06 PROCEDURE — 90834 PR PSYCHOTHERAPY W/PATIENT, 45 MIN: ICD-10-PCS | Mod: AJ,HB,, | Performed by: SOCIAL WORKER

## 2021-05-20 ENCOUNTER — OFFICE VISIT (OUTPATIENT)
Dept: PSYCHIATRY | Facility: CLINIC | Age: 31
End: 2021-05-20
Payer: MEDICAID

## 2021-05-20 DIAGNOSIS — F43.10 PTSD (POST-TRAUMATIC STRESS DISORDER): ICD-10-CM

## 2021-05-20 DIAGNOSIS — F33.1 MODERATE EPISODE OF RECURRENT MAJOR DEPRESSIVE DISORDER: ICD-10-CM

## 2021-05-20 DIAGNOSIS — F41.1 GAD (GENERALIZED ANXIETY DISORDER): Primary | ICD-10-CM

## 2021-05-20 PROCEDURE — 90834 PR PSYCHOTHERAPY W/PATIENT, 45 MIN: ICD-10-PCS | Mod: AJ,HB,, | Performed by: SOCIAL WORKER

## 2021-05-20 PROCEDURE — 90834 PSYTX W PT 45 MINUTES: CPT | Mod: AJ,HB,, | Performed by: SOCIAL WORKER

## 2021-05-25 PROBLEM — F51.05 INSOMNIA DUE TO MENTAL CONDITION: Status: ACTIVE | Noted: 2021-05-25

## 2021-05-25 PROBLEM — F39 MOOD DISORDER: Status: ACTIVE | Noted: 2021-05-25

## 2021-06-02 ENCOUNTER — TELEPHONE (OUTPATIENT)
Dept: PSYCHIATRY | Facility: CLINIC | Age: 31
End: 2021-06-02

## 2021-06-02 ENCOUNTER — OFFICE VISIT (OUTPATIENT)
Dept: PSYCHIATRY | Facility: CLINIC | Age: 31
End: 2021-06-02
Payer: MEDICAID

## 2021-06-02 VITALS
DIASTOLIC BLOOD PRESSURE: 94 MMHG | SYSTOLIC BLOOD PRESSURE: 138 MMHG | WEIGHT: 194.25 LBS | HEART RATE: 73 BPM | BODY MASS INDEX: 35.52 KG/M2

## 2021-06-02 DIAGNOSIS — F41.1 GAD (GENERALIZED ANXIETY DISORDER): ICD-10-CM

## 2021-06-02 DIAGNOSIS — F33.1 MODERATE EPISODE OF RECURRENT MAJOR DEPRESSIVE DISORDER: ICD-10-CM

## 2021-06-02 DIAGNOSIS — F50.81 BINGE EATING DISORDER: Primary | ICD-10-CM

## 2021-06-02 DIAGNOSIS — F39 MOOD DISORDER: ICD-10-CM

## 2021-06-02 DIAGNOSIS — F43.10 PTSD (POST-TRAUMATIC STRESS DISORDER): Primary | ICD-10-CM

## 2021-06-02 PROCEDURE — 99499 NO LOS: ICD-10-PCS | Mod: SA,HB,S$PBB, | Performed by: NURSE PRACTITIONER

## 2021-06-02 PROCEDURE — 90837 PSYTX W PT 60 MINUTES: CPT | Mod: S$PBB,AJ,HB, | Performed by: SOCIAL WORKER

## 2021-06-02 PROCEDURE — 90837 PSYTX W PT 60 MINUTES: CPT | Mod: PBBFAC | Performed by: SOCIAL WORKER

## 2021-06-02 PROCEDURE — 99499 UNLISTED E&M SERVICE: CPT | Mod: SA,HB,S$PBB, | Performed by: NURSE PRACTITIONER

## 2021-06-02 PROCEDURE — 90837 PR PSYCHOTHERAPY W/PATIENT, 60 MIN: ICD-10-PCS | Mod: S$PBB,AJ,HB, | Performed by: SOCIAL WORKER

## 2021-06-02 RX ORDER — LISDEXAMFETAMINE DIMESYLATE 30 MG/1
30 CAPSULE ORAL EVERY MORNING
Qty: 7 CAPSULE | Refills: 0 | Status: SHIPPED | OUTPATIENT
Start: 2021-06-02 | End: 2021-06-09

## 2021-06-02 RX ORDER — LISDEXAMFETAMINE DIMESYLATE 40 MG/1
40 CAPSULE ORAL DAILY
Qty: 7 CAPSULE | Refills: 0 | Status: SHIPPED | OUTPATIENT
Start: 2021-06-10 | End: 2021-06-19 | Stop reason: SDUPTHER

## 2021-06-08 ENCOUNTER — PATIENT MESSAGE (OUTPATIENT)
Dept: PSYCHIATRY | Facility: CLINIC | Age: 31
End: 2021-06-08

## 2021-06-08 DIAGNOSIS — Z00.00 ENCOUNTER FOR MEDICAL EXAMINATION TO ESTABLISH CARE: ICD-10-CM

## 2021-06-08 DIAGNOSIS — L70.9 ACNE, UNSPECIFIED ACNE TYPE: Primary | ICD-10-CM

## 2021-06-10 ENCOUNTER — PATIENT MESSAGE (OUTPATIENT)
Dept: PSYCHIATRY | Facility: CLINIC | Age: 31
End: 2021-06-10

## 2021-06-15 DIAGNOSIS — F39 MOOD DISORDER: ICD-10-CM

## 2021-06-16 ENCOUNTER — PATIENT MESSAGE (OUTPATIENT)
Dept: PSYCHIATRY | Facility: CLINIC | Age: 31
End: 2021-06-16

## 2021-06-17 ENCOUNTER — PATIENT MESSAGE (OUTPATIENT)
Dept: PSYCHIATRY | Facility: CLINIC | Age: 31
End: 2021-06-17

## 2021-06-17 RX ORDER — LISDEXAMFETAMINE DIMESYLATE 30 MG/1
30 CAPSULE ORAL EVERY MORNING
Qty: 30 CAPSULE | Refills: 0 | OUTPATIENT
Start: 2021-06-17 | End: 2021-07-17

## 2021-06-19 DIAGNOSIS — F50.81 BINGE EATING DISORDER: ICD-10-CM

## 2021-06-19 RX ORDER — LISDEXAMFETAMINE DIMESYLATE 30 MG/1
30 CAPSULE ORAL DAILY
Qty: 30 CAPSULE | Refills: 0 | Status: SHIPPED | OUTPATIENT
Start: 2021-06-19 | End: 2021-07-14 | Stop reason: SDUPTHER

## 2021-07-12 ENCOUNTER — PATIENT MESSAGE (OUTPATIENT)
Dept: PSYCHIATRY | Facility: CLINIC | Age: 31
End: 2021-07-12

## 2021-07-12 DIAGNOSIS — F50.81 BINGE EATING DISORDER: ICD-10-CM

## 2021-07-12 DIAGNOSIS — F39 MOOD DISORDER: ICD-10-CM

## 2021-07-14 ENCOUNTER — TELEPHONE (OUTPATIENT)
Dept: PSYCHIATRY | Facility: CLINIC | Age: 31
End: 2021-07-14

## 2021-07-14 RX ORDER — LISDEXAMFETAMINE DIMESYLATE 30 MG/1
30 CAPSULE ORAL DAILY
Qty: 30 CAPSULE | Refills: 0 | Status: SHIPPED | OUTPATIENT
Start: 2021-07-20 | End: 2023-08-28

## 2021-07-14 RX ORDER — OXCARBAZEPINE 600 MG/1
600 TABLET, FILM COATED ORAL NIGHTLY
Qty: 30 TABLET | Refills: 2 | Status: SHIPPED | OUTPATIENT
Start: 2021-07-14 | End: 2021-09-15 | Stop reason: DRUGHIGH

## 2021-07-14 RX ORDER — OXCARBAZEPINE 300 MG/1
300 TABLET, FILM COATED ORAL NIGHTLY
Qty: 30 TABLET | Refills: 2 | Status: SHIPPED | OUTPATIENT
Start: 2021-07-14 | End: 2021-09-15 | Stop reason: DRUGHIGH

## 2021-07-15 ENCOUNTER — PATIENT MESSAGE (OUTPATIENT)
Dept: PSYCHIATRY | Facility: CLINIC | Age: 31
End: 2021-07-15

## 2021-08-10 ENCOUNTER — PATIENT MESSAGE (OUTPATIENT)
Dept: PSYCHIATRY | Facility: CLINIC | Age: 31
End: 2021-08-10

## 2021-08-16 ENCOUNTER — TELEPHONE (OUTPATIENT)
Dept: PSYCHIATRY | Facility: CLINIC | Age: 31
End: 2021-08-16

## 2021-09-01 ENCOUNTER — PATIENT MESSAGE (OUTPATIENT)
Dept: PSYCHIATRY | Facility: CLINIC | Age: 31
End: 2021-09-01

## 2021-09-08 ENCOUNTER — PATIENT MESSAGE (OUTPATIENT)
Dept: PSYCHIATRY | Facility: CLINIC | Age: 31
End: 2021-09-08

## 2021-09-15 ENCOUNTER — OFFICE VISIT (OUTPATIENT)
Dept: PSYCHIATRY | Facility: CLINIC | Age: 31
End: 2021-09-15
Payer: MEDICAID

## 2021-09-15 DIAGNOSIS — L70.9 ACNE, UNSPECIFIED ACNE TYPE: ICD-10-CM

## 2021-09-15 DIAGNOSIS — F50.81 BINGE EATING DISORDER: ICD-10-CM

## 2021-09-15 DIAGNOSIS — F41.1 GAD (GENERALIZED ANXIETY DISORDER): ICD-10-CM

## 2021-09-15 DIAGNOSIS — F39 MOOD DISORDER: ICD-10-CM

## 2021-09-15 DIAGNOSIS — F43.10 PTSD (POST-TRAUMATIC STRESS DISORDER): ICD-10-CM

## 2021-09-15 DIAGNOSIS — F51.05 INSOMNIA DUE TO MENTAL CONDITION: ICD-10-CM

## 2021-09-15 PROCEDURE — 99214 PR OFFICE/OUTPT VISIT, EST, LEVL IV, 30-39 MIN: ICD-10-PCS | Mod: SA,HB,95, | Performed by: NURSE PRACTITIONER

## 2021-09-15 PROCEDURE — 99214 OFFICE O/P EST MOD 30 MIN: CPT | Mod: SA,HB,95, | Performed by: NURSE PRACTITIONER

## 2021-09-15 RX ORDER — ZIPRASIDONE HYDROCHLORIDE 40 MG/1
40 CAPSULE ORAL NIGHTLY
Qty: 14 CAPSULE | Refills: 0 | Status: SHIPPED | OUTPATIENT
Start: 2021-09-22 | End: 2021-10-06

## 2021-09-15 RX ORDER — ZIPRASIDONE HYDROCHLORIDE 20 MG/1
20 CAPSULE ORAL NIGHTLY
Qty: 7 CAPSULE | Refills: 0 | Status: SHIPPED | OUTPATIENT
Start: 2021-09-15 | End: 2021-09-22

## 2021-09-15 RX ORDER — OXCARBAZEPINE 300 MG/1
300 TABLET, FILM COATED ORAL NIGHTLY
Qty: 30 TABLET | Refills: 0 | Status: SHIPPED | OUTPATIENT
Start: 2021-09-15 | End: 2021-10-16

## 2021-09-15 RX ORDER — ZIPRASIDONE HYDROCHLORIDE 60 MG/1
60 CAPSULE ORAL NIGHTLY
Qty: 30 CAPSULE | Refills: 0 | Status: SHIPPED | OUTPATIENT
Start: 2021-10-07 | End: 2021-10-26 | Stop reason: SDUPTHER

## 2021-09-16 ENCOUNTER — PATIENT MESSAGE (OUTPATIENT)
Dept: PSYCHIATRY | Facility: CLINIC | Age: 31
End: 2021-09-16

## 2021-09-16 ENCOUNTER — PATIENT MESSAGE (OUTPATIENT)
Dept: UROLOGY | Facility: CLINIC | Age: 31
End: 2021-09-16

## 2021-09-27 ENCOUNTER — PATIENT MESSAGE (OUTPATIENT)
Dept: PODIATRY | Facility: CLINIC | Age: 31
End: 2021-09-27

## 2021-10-04 ENCOUNTER — PATIENT MESSAGE (OUTPATIENT)
Dept: PSYCHIATRY | Facility: CLINIC | Age: 31
End: 2021-10-04

## 2021-10-15 ENCOUNTER — PATIENT OUTREACH (OUTPATIENT)
Dept: ADMINISTRATIVE | Facility: HOSPITAL | Age: 31
End: 2021-10-15

## 2021-10-16 DIAGNOSIS — F39 MOOD DISORDER: ICD-10-CM

## 2021-10-16 RX ORDER — OXCARBAZEPINE 300 MG/1
300 TABLET, FILM COATED ORAL NIGHTLY
Qty: 30 TABLET | Refills: 2 | Status: SHIPPED | OUTPATIENT
Start: 2021-10-16 | End: 2023-08-28

## 2021-10-19 ENCOUNTER — OFFICE VISIT (OUTPATIENT)
Dept: DERMATOLOGY | Facility: CLINIC | Age: 31
End: 2021-10-19
Payer: MEDICAID

## 2021-10-19 DIAGNOSIS — L21.9 SEBORRHEIC DERMATITIS: ICD-10-CM

## 2021-10-19 DIAGNOSIS — L70.0 ACNE VULGARIS: Primary | ICD-10-CM

## 2021-10-19 DIAGNOSIS — L81.9 DYSCHROMIA: ICD-10-CM

## 2021-10-19 PROCEDURE — 99203 OFFICE O/P NEW LOW 30 MIN: CPT | Mod: S$PBB,,, | Performed by: DERMATOLOGY

## 2021-10-19 PROCEDURE — 99213 OFFICE O/P EST LOW 20 MIN: CPT | Mod: PBBFAC | Performed by: DERMATOLOGY

## 2021-10-19 PROCEDURE — 99999 PR PBB SHADOW E&M-EST. PATIENT-LVL III: ICD-10-PCS | Mod: PBBFAC,,, | Performed by: DERMATOLOGY

## 2021-10-19 PROCEDURE — 99203 PR OFFICE/OUTPT VISIT, NEW, LEVL III, 30-44 MIN: ICD-10-PCS | Mod: S$PBB,,, | Performed by: DERMATOLOGY

## 2021-10-19 PROCEDURE — 99999 PR PBB SHADOW E&M-EST. PATIENT-LVL III: CPT | Mod: PBBFAC,,, | Performed by: DERMATOLOGY

## 2021-10-19 RX ORDER — AZELAIC ACID 0.15 G/G
GEL TOPICAL
Qty: 50 G | Refills: 3 | Status: SHIPPED | OUTPATIENT
Start: 2021-10-19

## 2021-10-19 RX ORDER — CLINDAMYCIN PHOSPHATE 10 MG/ML
SOLUTION TOPICAL 2 TIMES DAILY
Qty: 60 EACH | Refills: 5 | Status: SHIPPED | OUTPATIENT
Start: 2021-10-19 | End: 2023-08-28

## 2021-10-19 RX ORDER — KETOCONAZOLE 20 MG/ML
SHAMPOO, SUSPENSION TOPICAL
Qty: 120 ML | Refills: 5 | Status: SHIPPED | OUTPATIENT
Start: 2021-10-19 | End: 2024-01-17 | Stop reason: SDUPTHER

## 2021-10-19 RX ORDER — FLUOCINOLONE ACETONIDE 0.11 MG/ML
OIL TOPICAL
Qty: 1 BOTTLE | Refills: 5 | Status: SHIPPED | OUTPATIENT
Start: 2021-10-19 | End: 2023-08-28

## 2021-10-19 RX ORDER — HYDROQUINONE 40 MG/G
CREAM TOPICAL
Qty: 28 G | Refills: 1 | Status: SHIPPED | OUTPATIENT
Start: 2021-10-19 | End: 2024-01-17 | Stop reason: SDUPTHER

## 2021-10-29 ENCOUNTER — HOSPITAL ENCOUNTER (OUTPATIENT)
Dept: RADIOLOGY | Facility: HOSPITAL | Age: 31
Discharge: HOME | End: 2021-10-29
Attending: NURSE PRACTITIONER
Payer: MEDICAID

## 2021-10-29 ENCOUNTER — OFFICE VISIT (OUTPATIENT)
Dept: PRIMARY CARE CLINIC | Facility: CLINIC | Age: 31
End: 2021-10-29
Payer: MEDICAID

## 2021-10-29 VITALS
BODY MASS INDEX: 34.53 KG/M2 | SYSTOLIC BLOOD PRESSURE: 138 MMHG | TEMPERATURE: 98 F | DIASTOLIC BLOOD PRESSURE: 95 MMHG | OXYGEN SATURATION: 99 % | HEART RATE: 78 BPM | WEIGHT: 187.63 LBS | HEIGHT: 62 IN

## 2021-10-29 DIAGNOSIS — J45.909 ASTHMA, UNSPECIFIED ASTHMA SEVERITY, UNSPECIFIED WHETHER COMPLICATED, UNSPECIFIED WHETHER PERSISTENT: ICD-10-CM

## 2021-10-29 DIAGNOSIS — R05.9 COUGH: ICD-10-CM

## 2021-10-29 DIAGNOSIS — R05.9 COUGH: Primary | ICD-10-CM

## 2021-10-29 PROCEDURE — 71046 XR CHEST PA AND LATERAL: ICD-10-PCS | Mod: 26,,, | Performed by: RADIOLOGY

## 2021-10-29 PROCEDURE — 71046 X-RAY EXAM CHEST 2 VIEWS: CPT | Mod: 26,,, | Performed by: RADIOLOGY

## 2021-10-29 PROCEDURE — 99215 OFFICE O/P EST HI 40 MIN: CPT | Mod: PBBFAC,25,PN | Performed by: NURSE PRACTITIONER

## 2021-10-29 PROCEDURE — 99999 PR PBB SHADOW E&M-EST. PATIENT-LVL V: ICD-10-PCS | Mod: PBBFAC,,, | Performed by: NURSE PRACTITIONER

## 2021-10-29 PROCEDURE — 99999 PR PBB SHADOW E&M-EST. PATIENT-LVL V: CPT | Mod: PBBFAC,,, | Performed by: NURSE PRACTITIONER

## 2021-10-29 PROCEDURE — 99203 PR OFFICE/OUTPT VISIT, NEW, LEVL III, 30-44 MIN: ICD-10-PCS | Mod: S$PBB,,, | Performed by: NURSE PRACTITIONER

## 2021-10-29 PROCEDURE — 71046 X-RAY EXAM CHEST 2 VIEWS: CPT | Mod: TC,PN

## 2021-10-29 PROCEDURE — 99203 OFFICE O/P NEW LOW 30 MIN: CPT | Mod: S$PBB,,, | Performed by: NURSE PRACTITIONER

## 2021-10-29 RX ORDER — MONTELUKAST SODIUM 10 MG/1
10 TABLET ORAL NIGHTLY
Qty: 30 TABLET | Refills: 5 | Status: SHIPPED | OUTPATIENT
Start: 2021-10-29 | End: 2021-11-28

## 2021-10-29 RX ORDER — ALBUTEROL SULFATE 90 UG/1
2 AEROSOL, METERED RESPIRATORY (INHALATION) EVERY 4 HOURS PRN
Qty: 18 G | Refills: 5 | Status: SHIPPED | OUTPATIENT
Start: 2021-10-29

## 2021-10-29 RX ORDER — FLUTICASONE PROPIONATE AND SALMETEROL 100; 50 UG/1; UG/1
1 POWDER RESPIRATORY (INHALATION) 2 TIMES DAILY
Qty: 60 EACH | Refills: 5 | Status: SHIPPED | OUTPATIENT
Start: 2021-10-29 | End: 2023-08-28

## 2021-10-29 RX ORDER — PREDNISONE 20 MG/1
20 TABLET ORAL DAILY
Qty: 4 TABLET | Refills: 0 | Status: SHIPPED | OUTPATIENT
Start: 2021-10-29 | End: 2021-11-02

## 2021-11-29 ENCOUNTER — TELEPHONE (OUTPATIENT)
Dept: OBSTETRICS AND GYNECOLOGY | Facility: CLINIC | Age: 31
End: 2021-11-29
Payer: MEDICAID

## 2021-11-29 ENCOUNTER — PATIENT MESSAGE (OUTPATIENT)
Dept: PSYCHIATRY | Facility: CLINIC | Age: 31
End: 2021-11-29
Payer: MEDICAID

## 2021-12-27 ENCOUNTER — PATIENT MESSAGE (OUTPATIENT)
Dept: UROLOGY | Facility: CLINIC | Age: 31
End: 2021-12-27
Payer: MEDICAID

## 2021-12-27 ENCOUNTER — PATIENT MESSAGE (OUTPATIENT)
Dept: PSYCHIATRY | Facility: CLINIC | Age: 31
End: 2021-12-27
Payer: MEDICAID

## 2021-12-27 ENCOUNTER — TELEPHONE (OUTPATIENT)
Dept: PSYCHIATRY | Facility: CLINIC | Age: 31
End: 2021-12-27
Payer: MEDICAID

## 2021-12-27 ENCOUNTER — TELEPHONE (OUTPATIENT)
Dept: OBSTETRICS AND GYNECOLOGY | Facility: CLINIC | Age: 31
End: 2021-12-27
Payer: MEDICAID

## 2021-12-30 ENCOUNTER — PATIENT MESSAGE (OUTPATIENT)
Dept: OBSTETRICS AND GYNECOLOGY | Facility: CLINIC | Age: 31
End: 2021-12-30

## 2021-12-30 ENCOUNTER — LAB VISIT (OUTPATIENT)
Dept: LAB | Facility: HOSPITAL | Age: 31
End: 2021-12-30
Attending: NURSE PRACTITIONER
Payer: MEDICAID

## 2021-12-30 ENCOUNTER — OFFICE VISIT (OUTPATIENT)
Dept: OBSTETRICS AND GYNECOLOGY | Facility: CLINIC | Age: 31
End: 2021-12-30
Payer: MEDICAID

## 2021-12-30 VITALS
BODY MASS INDEX: 36.92 KG/M2 | HEIGHT: 62 IN | DIASTOLIC BLOOD PRESSURE: 72 MMHG | SYSTOLIC BLOOD PRESSURE: 110 MMHG | WEIGHT: 200.63 LBS

## 2021-12-30 DIAGNOSIS — Z11.3 SCREENING EXAMINATION FOR STD (SEXUALLY TRANSMITTED DISEASE): ICD-10-CM

## 2021-12-30 DIAGNOSIS — Z11.3 SCREENING EXAMINATION FOR STD (SEXUALLY TRANSMITTED DISEASE): Primary | ICD-10-CM

## 2021-12-30 PROCEDURE — 1160F RVW MEDS BY RX/DR IN RCRD: CPT | Mod: CPTII,,, | Performed by: NURSE PRACTITIONER

## 2021-12-30 PROCEDURE — 36415 COLL VENOUS BLD VENIPUNCTURE: CPT | Performed by: NURSE PRACTITIONER

## 2021-12-30 PROCEDURE — 1160F PR REVIEW ALL MEDS BY PRESCRIBER/CLIN PHARMACIST DOCUMENTED: ICD-10-PCS | Mod: CPTII,,, | Performed by: NURSE PRACTITIONER

## 2021-12-30 PROCEDURE — 87591 N.GONORRHOEAE DNA AMP PROB: CPT | Performed by: NURSE PRACTITIONER

## 2021-12-30 PROCEDURE — 99213 OFFICE O/P EST LOW 20 MIN: CPT | Mod: S$PBB,,, | Performed by: NURSE PRACTITIONER

## 2021-12-30 PROCEDURE — 99213 PR OFFICE/OUTPT VISIT, EST, LEVL III, 20-29 MIN: ICD-10-PCS | Mod: S$PBB,,, | Performed by: NURSE PRACTITIONER

## 2021-12-30 PROCEDURE — 3074F SYST BP LT 130 MM HG: CPT | Mod: CPTII,,, | Performed by: NURSE PRACTITIONER

## 2021-12-30 PROCEDURE — 99213 OFFICE O/P EST LOW 20 MIN: CPT | Mod: PBBFAC | Performed by: NURSE PRACTITIONER

## 2021-12-30 PROCEDURE — 86592 SYPHILIS TEST NON-TREP QUAL: CPT | Performed by: NURSE PRACTITIONER

## 2021-12-30 PROCEDURE — 3008F BODY MASS INDEX DOCD: CPT | Mod: CPTII,,, | Performed by: NURSE PRACTITIONER

## 2021-12-30 PROCEDURE — 87491 CHLMYD TRACH DNA AMP PROBE: CPT | Performed by: NURSE PRACTITIONER

## 2021-12-30 PROCEDURE — 1159F MED LIST DOCD IN RCRD: CPT | Mod: CPTII,,, | Performed by: NURSE PRACTITIONER

## 2021-12-30 PROCEDURE — 87389 HIV-1 AG W/HIV-1&-2 AB AG IA: CPT | Performed by: NURSE PRACTITIONER

## 2021-12-30 PROCEDURE — 99999 PR PBB SHADOW E&M-EST. PATIENT-LVL III: ICD-10-PCS | Mod: PBBFAC,,, | Performed by: NURSE PRACTITIONER

## 2021-12-30 PROCEDURE — 3008F PR BODY MASS INDEX (BMI) DOCUMENTED: ICD-10-PCS | Mod: CPTII,,, | Performed by: NURSE PRACTITIONER

## 2021-12-30 PROCEDURE — 3078F DIAST BP <80 MM HG: CPT | Mod: CPTII,,, | Performed by: NURSE PRACTITIONER

## 2021-12-30 PROCEDURE — 99999 PR PBB SHADOW E&M-EST. PATIENT-LVL III: CPT | Mod: PBBFAC,,, | Performed by: NURSE PRACTITIONER

## 2021-12-30 PROCEDURE — 1159F PR MEDICATION LIST DOCUMENTED IN MEDICAL RECORD: ICD-10-PCS | Mod: CPTII,,, | Performed by: NURSE PRACTITIONER

## 2021-12-30 PROCEDURE — 3074F PR MOST RECENT SYSTOLIC BLOOD PRESSURE < 130 MM HG: ICD-10-PCS | Mod: CPTII,,, | Performed by: NURSE PRACTITIONER

## 2021-12-30 PROCEDURE — 3078F PR MOST RECENT DIASTOLIC BLOOD PRESSURE < 80 MM HG: ICD-10-PCS | Mod: CPTII,,, | Performed by: NURSE PRACTITIONER

## 2021-12-30 PROCEDURE — 80074 ACUTE HEPATITIS PANEL: CPT | Performed by: NURSE PRACTITIONER

## 2021-12-31 LAB
HAV IGM SERPL QL IA: NEGATIVE
HBV CORE IGM SERPL QL IA: NEGATIVE
HBV SURFACE AG SERPL QL IA: NEGATIVE
HCV AB SERPL QL IA: NEGATIVE
HIV 1+2 AB+HIV1 P24 AG SERPL QL IA: NEGATIVE
RPR SER QL: NORMAL

## 2022-01-06 LAB
C TRACH DNA SPEC QL NAA+PROBE: NOT DETECTED
N GONORRHOEA DNA SPEC QL NAA+PROBE: NOT DETECTED

## 2022-01-18 ENCOUNTER — IMMUNIZATION (OUTPATIENT)
Dept: PRIMARY CARE CLINIC | Facility: CLINIC | Age: 32
End: 2022-01-18
Payer: MEDICAID

## 2022-01-18 DIAGNOSIS — Z23 NEED FOR VACCINATION: Primary | ICD-10-CM

## 2022-01-18 PROCEDURE — 0004A COVID-19, MRNA, LNP-S, PF, 30 MCG/0.3 ML DOSE VACCINE: CPT | Mod: CV19,PBBFAC | Performed by: FAMILY MEDICINE

## 2022-02-01 ENCOUNTER — TELEPHONE (OUTPATIENT)
Dept: PRIMARY CARE CLINIC | Facility: CLINIC | Age: 32
End: 2022-02-01
Payer: MEDICAID

## 2022-09-13 ENCOUNTER — HOSPITAL ENCOUNTER (EMERGENCY)
Facility: HOSPITAL | Age: 32
Discharge: HOME | End: 2022-09-13
Attending: EMERGENCY MEDICINE
Payer: MEDICAID

## 2022-09-13 VITALS
HEART RATE: 90 BPM | TEMPERATURE: 99 F | SYSTOLIC BLOOD PRESSURE: 177 MMHG | DIASTOLIC BLOOD PRESSURE: 104 MMHG | WEIGHT: 200.81 LBS | RESPIRATION RATE: 20 BRPM | OXYGEN SATURATION: 100 % | HEIGHT: 62 IN | BODY MASS INDEX: 36.95 KG/M2

## 2022-09-13 DIAGNOSIS — K08.89 PAIN, DENTAL: Primary | ICD-10-CM

## 2022-09-13 PROCEDURE — 99283 EMERGENCY DEPT VISIT LOW MDM: CPT

## 2022-09-13 RX ORDER — OXYCODONE AND ACETAMINOPHEN 10; 325 MG/1; MG/1
1 TABLET ORAL EVERY 6 HOURS PRN
Qty: 18 TABLET | Refills: 0 | Status: SHIPPED | OUTPATIENT
Start: 2022-09-13 | End: 2023-08-28

## 2022-09-13 NOTE — ED NOTES
Patient identifiers verified and correct for Hesham Palacios    Right sided wisdom tooth pain pain 10/10    LOC: The patient is awake, alert and aware of environment with an appropriate affect, the patient is oriented x 3 and speaking appropriately.    APPEARANCE: Patient resting comfortably and in no acute distress, patient is clean and well groomed, patient's clothing is properly fastened.    SKIN: The skin is warm and dry, color consistent with ethnicity, patient has normal skin turgor and moist mucus membranes, skin intact, no breakdown or bruising noted.     MUSCULOSKELETAL: Patient moving all extremities spontaneously.    RESPIRATORY: Airway is open and patent, respirations are spontaneous.    CARDIAC: Patient has a normal rate, no periphreal edema noted, capillary refill < 3 seconds.    ABDOMEN: Soft and non tender to palpation.

## 2022-09-13 NOTE — ED PROVIDER NOTES
SCRIBE #1 NOTE: IPatti, am scribing for, and in the presence of, No att. providers found. I have scribed the entire note.       History     Chief Complaint   Patient presents with    Dental Problem     Pt had bottom left wisdom tooth pulled Sat. Pt presents to ED [facility] [place] pain and swelling to L side of face. Pt reports that RX meds not working     Review of patient's allergies indicates:   Allergen Reactions    Latex, natural rubber Itching    Morphine Itching, Other (See Comments) and Rash     Burning sensation    Tylenol [acetaminophen] Rash         History of Present Illness     HPI    2022, 4:56 AM  History obtained from the patient      History of Present Illness: Hesham Palacios is a 32 y.o. female patient with a PMHx of anemia who presents to the Emergency Department for evaluation of a dental problem. Pt had her lower left wisdom tooth pulled 3 days ago, but the dentist had to leave the root of the tooth. Pt c/o pain and facial swelling to the left side of her face. She has an appointment with an oral surgeon later this week. Symptoms are constant and moderate in severity. No mitigating or exacerbating factors reported. Associated sxs include a HA. Patient denies any fever, chills, nausea, vomiting, dizziness, and all other sxs at this time. Prior Tx includes hydrocodone with no relief. No further complaints or concerns at this time.       Arrival mode: Personal vehicle    PCP: Primary Doctor No        Past Medical History:  Past Medical History:   Diagnosis Date    Anemia     Herpes simplex virus (HSV) infection     Hx of migraine headaches        Past Surgical History:  Past Surgical History:   Procedure Laterality Date     SECTION      x3    CYST REMOVAL Right     Right arm     DILATION AND CURETTAGE OF UTERUS      TUBAL LIGATION           Family History:  Family History   Problem Relation Age of Onset    Sleep apnea Father     No Known Problems Mother     Breast cancer Neg Hx      Colon cancer Neg Hx     Ovarian cancer Neg Hx     Uterine cancer Neg Hx     Cervical cancer Neg Hx        Social History:  Social History     Tobacco Use    Smoking status: Never    Smokeless tobacco: Never   Substance and Sexual Activity    Alcohol use: Yes     Comment: occ    Drug use: No    Sexual activity: Yes     Partners: Male     Birth control/protection: See Surgical Hx     Comment: BTL         Review of Systems     Review of Systems   Constitutional:  Negative for chills and fever.   HENT:  Positive for dental problem and facial swelling. Negative for sore throat.    Respiratory:  Negative for shortness of breath.    Cardiovascular:  Negative for chest pain.   Gastrointestinal:  Negative for nausea and vomiting.   Genitourinary:  Negative for dysuria.   Musculoskeletal:  Negative for back pain.   Skin:  Negative for rash.   Neurological:  Positive for headaches. Negative for dizziness and weakness.   Hematological:  Does not bruise/bleed easily.   All other systems reviewed and are negative.     Physical Exam     Initial Vitals [09/13/22 0337]   BP Pulse Resp Temp SpO2   (!) 177/104 90 20 98.8 °F (37.1 °C) 100 %      MAP       --          Physical Exam  Nursing Notes and Vital Signs Reviewed.  Constitutional: Patient is in no acute distress. Well-developed and well-nourished.  Head: Atraumatic. Normocephalic.  Eyes: PERRL. EOM intact. Conjunctivae are not pale. No scleral icterus.  ENT: Mucous membranes are moist. Oropharynx is clear and symmetric.  Tenderness and swelling to the wisdom tooth on the lower left side.   Neck: Supple. Full ROM. No lymphadenopathy.  Cardiovascular: Regular rate. Regular rhythm. No murmurs, rubs, or gallops. Distal pulses are 2+ and symmetric.  Pulmonary/Chest: No respiratory distress. Clear to auscultation bilaterally. No wheezing or rales.  Abdominal: Soft and non-distended.  There is no tenderness.  No rebound, guarding, or rigidity.   Musculoskeletal: Moves all extremities.  "No obvious deformities. No edema.   Skin: Warm and dry.  Neurological:  Alert, awake, and appropriate.  Normal speech.  No acute focal neurological deficits are appreciated.  Psychiatric: Normal affect. Good eye contact. Appropriate in content.     ED Course   Procedures  ED Vital Signs:  Vitals:    09/13/22 0337   BP: (!) 177/104   Pulse: 90   Resp: 20   Temp: 98.8 °F (37.1 °C)   TempSrc: Oral   SpO2: 100%   Weight: 91.1 kg (200 lb 13.4 oz)   Height: 5' 2" (1.575 m)                  The Emergency Provider reviewed the vital signs and test results, which are outlined above.     ED Discussion       5:06 AM: Reassessed pt at this time. Discussed with pt all pertinent ED information and results. Discussed pt dx and plan of tx. Gave pt all f/u and return to the ED instructions. All questions and concerns were addressed at this time. Pt expresses understanding of information and instructions, and is comfortable with plan to discharge. Pt is stable for discharge.    I discussed with patient and/or family/caretaker that evaluation in the ED does not suggest any emergent or life threatening medical conditions requiring immediate intervention beyond what was provided in the ED, and I believe patient is safe for discharge.  Regardless, an unremarkable evaluation in the ED does not preclude the development or presence of a serious of life threatening condition. As such, patient was instructed to return immediately for any worsening or change in current symptoms.                   ED Medication(s):  Medications - No data to display    Discharge Medication List as of 9/13/2022  5:06 AM        START taking these medications    Details   oxyCODONE-acetaminophen (PERCOCET)  mg per tablet Take 1 tablet by mouth every 6 (six) hours as needed for Pain., Starting Tue 9/13/2022, Print              Follow-up Information       Your oral surgeon In 2 days.    Why: As scheduled             [facility] - Emergency Dept..    Specialty: " Emergency Medicine  Why: As needed, If symptoms worsen  Contact information:  [address]  [facility] [address] [phone]                               Scribe Attestation:   Scribe #1: I performed the above scribed service and the documentation accurately describes the services I performed. I attest to the accuracy of the note.     Attending:   Physician Attestation Statement for Scribe #1: I, Patti España MD, personally performed the services described in this documentation, as scribed by Nalini Merida, in my presence, and it is both accurate and complete.           Clinical Impression       ICD-10-CM ICD-9-CM   1. Pain, dental  K08.89 525.9       Disposition:   Disposition: Discharged  Condition: Stable       Patti España MD  09/13/22 0553

## 2022-10-21 ENCOUNTER — OFFICE VISIT (OUTPATIENT)
Dept: PRIMARY CARE CLINIC | Facility: CLINIC | Age: 32
End: 2022-10-21
Payer: MEDICAID

## 2022-10-21 ENCOUNTER — HOSPITAL ENCOUNTER (OUTPATIENT)
Dept: RADIOLOGY | Facility: HOSPITAL | Age: 32
Discharge: HOME | End: 2022-10-21
Attending: NURSE PRACTITIONER
Payer: MEDICAID

## 2022-10-21 VITALS
SYSTOLIC BLOOD PRESSURE: 144 MMHG | OXYGEN SATURATION: 97 % | TEMPERATURE: 97 F | WEIGHT: 198.69 LBS | HEART RATE: 67 BPM | BODY MASS INDEX: 36.56 KG/M2 | HEIGHT: 62 IN | DIASTOLIC BLOOD PRESSURE: 98 MMHG

## 2022-10-21 DIAGNOSIS — Z13.220 ENCOUNTER FOR LIPID SCREENING FOR CARDIOVASCULAR DISEASE: ICD-10-CM

## 2022-10-21 DIAGNOSIS — Z00.00 GENERAL MEDICAL EXAM: ICD-10-CM

## 2022-10-21 DIAGNOSIS — Z13.6 ENCOUNTER FOR LIPID SCREENING FOR CARDIOVASCULAR DISEASE: ICD-10-CM

## 2022-10-21 DIAGNOSIS — Z11.4 SCREENING FOR HIV (HUMAN IMMUNODEFICIENCY VIRUS): ICD-10-CM

## 2022-10-21 DIAGNOSIS — R05.9 COUGH, UNSPECIFIED TYPE: ICD-10-CM

## 2022-10-21 DIAGNOSIS — Z86.39 HISTORY OF METABOLIC AND NUTRITIONAL DISORDER: ICD-10-CM

## 2022-10-21 DIAGNOSIS — Z11.59 ENCOUNTER FOR HEPATITIS C SCREENING TEST FOR LOW RISK PATIENT: ICD-10-CM

## 2022-10-21 DIAGNOSIS — J45.909 ASTHMA, UNSPECIFIED ASTHMA SEVERITY, UNSPECIFIED WHETHER COMPLICATED, UNSPECIFIED WHETHER PERSISTENT: ICD-10-CM

## 2022-10-21 DIAGNOSIS — K05.219 GINGIVAL ABSCESS: Primary | ICD-10-CM

## 2022-10-21 PROCEDURE — 99214 PR OFFICE/OUTPT VISIT, EST, LEVL IV, 30-39 MIN: ICD-10-PCS | Mod: 95,,, | Performed by: NURSE PRACTITIONER

## 2022-10-21 PROCEDURE — 3077F PR MOST RECENT SYSTOLIC BLOOD PRESSURE >= 140 MM HG: ICD-10-PCS | Mod: CPTII,95,, | Performed by: NURSE PRACTITIONER

## 2022-10-21 PROCEDURE — 3080F DIAST BP >= 90 MM HG: CPT | Mod: CPTII,95,, | Performed by: NURSE PRACTITIONER

## 2022-10-21 PROCEDURE — 3077F SYST BP >= 140 MM HG: CPT | Mod: CPTII,95,, | Performed by: NURSE PRACTITIONER

## 2022-10-21 PROCEDURE — 1159F PR MEDICATION LIST DOCUMENTED IN MEDICAL RECORD: ICD-10-PCS | Mod: CPTII,95,, | Performed by: NURSE PRACTITIONER

## 2022-10-21 PROCEDURE — 71046 X-RAY EXAM CHEST 2 VIEWS: CPT | Mod: 26,,, | Performed by: RADIOLOGY

## 2022-10-21 PROCEDURE — 71046 X-RAY EXAM CHEST 2 VIEWS: CPT | Mod: TC,PN

## 2022-10-21 PROCEDURE — 3044F PR MOST RECENT HEMOGLOBIN A1C LEVEL <7.0%: ICD-10-PCS | Mod: CPTII,95,, | Performed by: NURSE PRACTITIONER

## 2022-10-21 PROCEDURE — 99214 OFFICE O/P EST MOD 30 MIN: CPT | Mod: 95,,, | Performed by: NURSE PRACTITIONER

## 2022-10-21 PROCEDURE — 3080F PR MOST RECENT DIASTOLIC BLOOD PRESSURE >= 90 MM HG: ICD-10-PCS | Mod: CPTII,95,, | Performed by: NURSE PRACTITIONER

## 2022-10-21 PROCEDURE — 1159F MED LIST DOCD IN RCRD: CPT | Mod: CPTII,95,, | Performed by: NURSE PRACTITIONER

## 2022-10-21 PROCEDURE — 3044F HG A1C LEVEL LT 7.0%: CPT | Mod: CPTII,95,, | Performed by: NURSE PRACTITIONER

## 2022-10-21 PROCEDURE — 71046 XR CHEST PA AND LATERAL: ICD-10-PCS | Mod: 26,,, | Performed by: RADIOLOGY

## 2022-10-21 RX ORDER — KETOROLAC TROMETHAMINE 30 MG/ML
60 INJECTION, SOLUTION INTRAMUSCULAR; INTRAVENOUS
Status: COMPLETED | OUTPATIENT
Start: 2022-10-21 | End: 2022-10-21

## 2022-10-21 RX ADMIN — KETOROLAC TROMETHAMINE 60 MG: 30 INJECTION, SOLUTION INTRAMUSCULAR; INTRAVENOUS at 10:10

## 2022-10-21 NOTE — PROGRESS NOTES
Subjective:       Patient ID: Hesham Palacios is a 32 y.o. female.    Chief Complaint: Gingival Pain, labs and pain  The patient location is: [place], [place]     Visit type: audiovisual    Face to Face time with patient: 11 min  12 minutes of total time spent on the encounter, which includes face to face time and non-face to face time preparing to see the patient (eg, review of tests), Obtaining and/or reviewing separately obtained history, Documenting clinical information in the electronic or other health record, Independently interpreting results (not separately reported) and communicating results to the patient/family/caregiver, or Care coordination (not separately reported).         Each patient to whom he or she provides medical services by telemedicine is:  (1) informed of the relationship between the physician and patient and the respective role of any other health care provider with respect to management of the patient; and (2) notified that he or she may decline to receive medical services by telemedicine and may withdraw from such care at any time.    Notes:      History of Present Illness:   Hesham Palacios 32 y.o. female presents today with reports of gingival abscess and pain, labs. Patient reports that her blood pressure has been elevated. Treatment options and alternatives were discussed with the patient. Patient provided opportunity to ask additional questions.  All questions were answered. Voices understanding and acceptance of this advice. Instructed to call back if any further questions or concerns.      Past Medical History:   Diagnosis Date    Anemia     Herpes simplex virus (HSV) infection     Hx of migraine headaches      Family History   Problem Relation Age of Onset    Sleep apnea Father     No Known Problems Mother     Breast cancer Neg Hx     Colon cancer Neg Hx     Ovarian cancer Neg Hx     Uterine cancer Neg Hx     Cervical cancer Neg Hx      Social History     Socioeconomic History     Marital status: Significant Other   Tobacco Use    Smoking status: Never    Smokeless tobacco: Never   Substance and Sexual Activity    Alcohol use: Yes     Comment: occ    Drug use: No    Sexual activity: Yes     Partners: Male     Birth control/protection: See Surgical Hx     Comment: BTL      Social Determinants of Health     Financial Resource Strain: Low Risk     Difficulty of Paying Living Expenses: Not hard at all   Food Insecurity: No Food Insecurity    Worried About Running Out of Food in the Last Year: Never true    Ran Out of Food in the Last Year: Never true   Transportation Needs: No Transportation Needs    Lack of Transportation (Medical): No    Lack of Transportation (Non-Medical): No   Stress: No Stress Concern Present    Feeling of Stress : Not at all   Social Connections: Moderately Integrated    Frequency of Communication with Friends and Family: Twice a week    Frequency of Social Gatherings with Friends and Family: Twice a week    Attends [employer] Services: More than 4 times per year    Active Member of Clubs or Organizations: Yes    Attends Club or Organization Meetings: More than 4 times per year    Marital Status: Never    Housing Stability: Low Risk     Unable to Pay for Housing in the Last Year: No    Number of Places Lived in the Last Year: 1    Unstable Housing in the Last Year: No     Outpatient Encounter Medications as of 10/21/2022   Medication Sig Dispense Refill    albuterol (PROVENTIL/VENTOLIN HFA) 90 mcg/actuation inhaler Inhale 2 puffs into the lungs every 4 (four) hours as needed for Wheezing (cough). 18 g 5    azelaic acid (FINACEA) 15 % gel AAA bid 50 g 3    ciclopirox (PENLAC) 8 % Soln Apply to affected toenails at night time DAILY. On 7th day, file nails down, clean all nails with alcohol and restart application process. 1 Bottle 10    clindamycin (CLEOCIN T) 1 % Swab Apply topically 2 (two) times daily. 60 each 5    fluocinolone (DERMA-SMOOTHE) 0.01 % external oil  Apply oil to scalp twice a day prn 1 Bottle 5    fluticasone-salmeterol diskus inhaler 100-50 mcg Inhale 1 puff into the lungs 2 (two) times daily. Controller 60 each 5    hydroquinone 4 % Crea Apply to dark spots once daily. Use with sunscreen if outdoors 28 g 1    ketoconazole (NIZORAL) 2 % shampoo Wash hair with medicated shampoo at least 1x/week - let sit on scalp at least 5 minutes prior to rinsing 120 mL 5    lisdexamfetamine (VYVANSE) 30 MG capsule Take 1 capsule (30 mg total) by mouth once daily. 30 capsule 0    naftifine (NAFTIN) 2 % Gel Apply 1 application topically 2 (two) times a day. 1 Tube 1    olopatadine (PATADAY) 0.2 % Drop Place 1 drop into both eyes once daily. 2.5 mL 0    OXcarbazepine (TRILEPTAL) 300 MG Tab Take 1 tablet (300 mg total) by mouth nightly. 30 tablet 2    oxyCODONE-acetaminophen (PERCOCET)  mg per tablet Take 1 tablet by mouth every 6 (six) hours as needed for Pain. 18 tablet 0    terbinafine HCL (LAMISIL) 250 mg tablet Take 1 tablet (250 mg total) by mouth once daily. 1 pill by mouth x 90 days. Avoid alcohol intake while taking medication 90 tablet 0    valACYclovir (VALTREX) 500 MG tablet Take 1 tablet (500 mg total) by mouth once daily. 30 tablet 12    ziprasidone (GEODON) 60 MG Cap TAKE 1 CAPSULE BY MOUTH EVERY NIGHT 30 capsule 2    [] ketorolac injection 60 mg        No facility-administered encounter medications on file as of 10/21/2022.       Review of Systems   Constitutional:  Negative for appetite change, chills and fever.   HENT:  Positive for dental problem (gingival abscess/tooth). Negative for ear pain, sinus pressure, sore throat and trouble swallowing.    Eyes:  Negative for visual disturbance.   Respiratory:  Positive for cough and wheezing. Negative for shortness of breath.    Cardiovascular:  Negative for chest pain.   Gastrointestinal:  Negative for abdominal pain, diarrhea, nausea and vomiting.   Endocrine: Negative for cold intolerance, polyphagia  "and polyuria.   Genitourinary:  Negative for decreased urine volume and dysuria.   Musculoskeletal:  Negative for back pain.   Skin:  Negative for rash.   Allergic/Immunologic: Negative for environmental allergies and food allergies.   Neurological:  Negative for dizziness, tremors, weakness and numbness.   Hematological:  Does not bruise/bleed easily.   Psychiatric/Behavioral:  Negative for confusion and hallucinations. The patient is not nervous/anxious and is not hyperactive.    All other systems reviewed and are negative.    Objective:      BP (!) 144/98 (BP Location: Right arm, Patient Position: Sitting, BP Method: Medium (Manual))   Pulse 67   Temp 97 °F (36.1 °C) (Temporal)   Ht 5' 2" (1.575 m)   Wt 90.1 kg (198 lb 11.2 oz)   SpO2 97%   BMI 36.34 kg/m²   Physical Exam  Constitutional:       Appearance: Normal appearance.   Neurological:      Mental Status: She is alert.       Results for orders placed or performed in visit on 12/30/21   HIV 1/2 Ag/Ab (4th Gen)   Result Value Ref Range    HIV 1/2 Ag/Ab Negative Negative   RPR   Result Value Ref Range    RPR Non-reactive Non-reactive   Hepatitis Panel, Acute   Result Value Ref Range    Hepatitis B Surface Ag Negative Negative    Hep B C IgM Negative Negative    Hep A IgM Negative Negative    Hepatitis C Ab Negative Negative     Assessment:       1. Gingival abscess    2. Asthma, unspecified asthma severity, unspecified whether complicated, unspecified whether persistent    3. General medical exam    4. Cough, unspecified type    5. Screening for HIV (human immunodeficiency virus)    6. Encounter for lipid screening for cardiovascular disease    7. History of metabolic and nutritional disorder    8. Encounter for hepatitis C screening test for low risk patient        Plan:   Diagnoses and all orders for this visit:    Gingival abscess  -     ketorolac injection 60 mg    Asthma, unspecified asthma severity, unspecified whether complicated, unspecified " whether persistent    General medical exam  -     CBC Auto Differential; Future  -     Comprehensive Metabolic Panel; Future    Cough, unspecified type  -     X-Ray Chest PA And Lateral; Future    Screening for HIV (human immunodeficiency virus)  -     HIV 1/2 Ag/Ab (4th Gen); Future    Encounter for lipid screening for cardiovascular disease  -     Lipid Panel; Future    History of metabolic and nutritional disorder  -     Hemoglobin A1C; Future  -     TSH; Future  -     T4, Free; Future    Encounter for hepatitis C screening test for low risk patient  -     Hepatitis C Antibody; Future             [facility]   [address]  [place] [address]  Office [phone]  Fax [phone]

## 2022-11-04 ENCOUNTER — CLINICAL SUPPORT (OUTPATIENT)
Dept: PRIMARY CARE CLINIC | Facility: CLINIC | Age: 32
End: 2022-11-04
Payer: MEDICAID

## 2022-11-04 ENCOUNTER — TELEPHONE (OUTPATIENT)
Dept: PRIMARY CARE CLINIC | Facility: CLINIC | Age: 32
End: 2022-11-04

## 2022-11-04 VITALS
HEART RATE: 90 BPM | RESPIRATION RATE: 20 BRPM | DIASTOLIC BLOOD PRESSURE: 88 MMHG | OXYGEN SATURATION: 98 % | SYSTOLIC BLOOD PRESSURE: 114 MMHG

## 2022-11-04 DIAGNOSIS — Z13.220 ENCOUNTER FOR LIPID SCREENING FOR CARDIOVASCULAR DISEASE: Primary | ICD-10-CM

## 2022-11-04 DIAGNOSIS — Z13.6 ENCOUNTER FOR LIPID SCREENING FOR CARDIOVASCULAR DISEASE: Primary | ICD-10-CM

## 2022-11-04 PROCEDURE — 99999 PR PBB SHADOW E&M-EST. PATIENT-LVL II: ICD-10-PCS | Mod: PBBFAC,,,

## 2022-11-04 PROCEDURE — 99212 OFFICE O/P EST SF 10 MIN: CPT | Mod: PBBFAC,PN

## 2022-11-04 PROCEDURE — 99999 PR PBB SHADOW E&M-EST. PATIENT-LVL II: CPT | Mod: PBBFAC,,,

## 2022-11-04 NOTE — PROGRESS NOTES
BP check done with reading for right arm while sitting down 114/88  Pulse 90   R 20 unlabored    Pulse ox 98 %     Pain level : 4 ( left lower nerve back tooth )    Falls : 0   pt is presently not on BP medication ; pt requesting a referral to psychiatry for Anxiety ; RICARDO Fuller DNP , please review updated information and advices

## 2023-03-29 ENCOUNTER — PATIENT MESSAGE (OUTPATIENT)
Dept: PODIATRY | Facility: CLINIC | Age: 33
End: 2023-03-29
Payer: MEDICAID

## 2023-03-30 ENCOUNTER — PATIENT MESSAGE (OUTPATIENT)
Dept: PODIATRY | Facility: CLINIC | Age: 33
End: 2023-03-30
Payer: MEDICAID

## 2023-03-30 ENCOUNTER — OFFICE VISIT (OUTPATIENT)
Dept: PODIATRY | Facility: CLINIC | Age: 33
End: 2023-03-30
Payer: MEDICAID

## 2023-03-30 VITALS — BODY MASS INDEX: 36.44 KG/M2 | HEIGHT: 62 IN | WEIGHT: 198 LBS

## 2023-03-30 DIAGNOSIS — B35.3 TINEA PEDIS OF BOTH FEET: ICD-10-CM

## 2023-03-30 DIAGNOSIS — L60.2 NAIL DISORDER (ONYCHOGRYPHOSIS): ICD-10-CM

## 2023-03-30 DIAGNOSIS — B35.1 DERMATOPHYTOSIS OF NAIL: Primary | ICD-10-CM

## 2023-03-30 PROCEDURE — 99214 PR OFFICE/OUTPT VISIT, EST, LEVL IV, 30-39 MIN: ICD-10-PCS | Mod: S$PBB,,, | Performed by: PODIATRIST

## 2023-03-30 PROCEDURE — 99999 PR PBB SHADOW E&M-EST. PATIENT-LVL III: ICD-10-PCS | Mod: PBBFAC,,, | Performed by: PODIATRIST

## 2023-03-30 PROCEDURE — 3008F BODY MASS INDEX DOCD: CPT | Mod: CPTII,,, | Performed by: PODIATRIST

## 2023-03-30 PROCEDURE — 99213 OFFICE O/P EST LOW 20 MIN: CPT | Mod: PBBFAC | Performed by: PODIATRIST

## 2023-03-30 PROCEDURE — 99214 OFFICE O/P EST MOD 30 MIN: CPT | Mod: S$PBB,,, | Performed by: PODIATRIST

## 2023-03-30 PROCEDURE — 3008F PR BODY MASS INDEX (BMI) DOCUMENTED: ICD-10-PCS | Mod: CPTII,,, | Performed by: PODIATRIST

## 2023-03-30 PROCEDURE — 99999 PR PBB SHADOW E&M-EST. PATIENT-LVL III: CPT | Mod: PBBFAC,,, | Performed by: PODIATRIST

## 2023-03-30 RX ORDER — KETOCONAZOLE 20 MG/G
CREAM TOPICAL DAILY
Qty: 30 G | Refills: 1 | Status: SHIPPED | OUTPATIENT
Start: 2023-03-30

## 2023-03-30 NOTE — PATIENT INSTRUCTIONS
Thank you for choosing [facility] and Dr. Nelli Rosario to take care of you.      I have provided further information for you about your diagnoses and/or aftercare for treatment. With a combination of care, patient education, and following the provided recommendations, we will do our best to work together to alleviate your pain. Please do not hesitate to reach out to me via MyChart, email, or phone ([phone]) if you have any questions, comments, or concerns.      If you felt that you received exemplary care today, please consider leaving us feedback on the survey that you will receive in the next few days. Your feedback is important to us.        Sincerely,  Dr. Nelli Rosario           Things to Purchase Over the Counter (you can ask your pharmacist if any issues with finding the items. Most of the items will be located in the foot products area which are by the pharmacy at [facility], [facility], [facility], etc.    -Purchase the prescription  which was sent to your pharmacy and apply as instructed.    List of Over the counter items to purchase:  1. Domeboro powder packets  2. Dial soap  3. Roll on deodorant  4. Zeasorb ANTIFUNGAL powder for the foot  5. Antifungal prescription that was sent to your pharmacist       You have been diagnosed with a FUNGAL INFECTION in your webspace.    Instructions:    1. Purchase Domeboro powder packets from local pharmacy store (such as [employer]); Follow instructions on the packet and dissolve the packet in cool water. Soak your foot the solution in COOL water for 15 to 30 minutes. Discard solution after each use. PERFORM for 7 DAYS.    2. After soaking, Make sure you dry out between your toes. It is very important to keep this area dry. Use antibacterial soap in the shower. Use antibacterial soap (DIAL) in the shower to wash your feet. Make sure you dry out between your toes. It is very important to keep this area dry.     3. Then apply the antifungal cream to your  "webspaces.    Then, Apply  "ROLL ON" deodorant ex. Certain Dry. The purpose of the roll on deodorant is to dry out the area due to too much moisture in your toe webspaces)    4.  Apply ZEASORB antifungal powder (this is purchased over the counter in the foot aisle by the pharmacy) to your feet and in your shoes daily; Wear cotton socks (80%) or open shoes and sandals.      *Perform the above instructions TWICE daily (in the morning and at Night) for about 2 weeks or until the infection clears. Your toe webspace should be CLEAN and no longer white or discolored when the infection is gone. *    If you have any questions, feel free to call my office at [phone]. Otherwise, I will see you in your follow up visit.     "

## 2023-03-30 NOTE — PROGRESS NOTES
[facility]  PODIATRIC MEDICINE AND SURGERY      CHIEF COMPLAINT   Chief Complaint   Patient presents with    Nail Problem     C/o bilateral foot fungus, great toe b/l, x several years, tx prev for this problem, pt states that she did have improvement, non-diabetic, pt is wearing causal shoes, Pt was last seen on 10/21/22 by PCP Zoya Fuller, CELY         HPI:    Hesham Palacios is a 33 y.o. female presenting to podiatry clinic with complaint of fungal infection on toenails and feet. The patient has tried over the counter medications and oral therapy with some success, but the problem is recurrent and aggravating. Patient inquires about available treatment options. No further pedal complaints.      PMH  Past Medical History:   Diagnosis Date    Anemia     Herpes simplex virus (HSV) infection     Hx of migraine headaches        PROBLEM LIST  Patient Active Problem List    Diagnosis Date Noted    Insomnia due to mental condition 05/25/2021    Mood disorder 05/25/2021    ANA (generalized anxiety disorder) 04/24/2021    PTSD (post-traumatic stress disorder) 04/24/2021    Moderate episode of recurrent major depressive disorder 04/24/2021       MEDS  Current Outpatient Medications on File Prior to Visit   Medication Sig Dispense Refill    albuterol (PROVENTIL/VENTOLIN HFA) 90 mcg/actuation inhaler Inhale 2 puffs into the lungs every 4 (four) hours as needed for Wheezing (cough). 18 g 5    azelaic acid (FINACEA) 15 % gel AAA bid 50 g 3    hydroquinone 4 % Crea Apply to dark spots once daily. Use with sunscreen if outdoors 28 g 1    ketoconazole (NIZORAL) 2 % shampoo Wash hair with medicated shampoo at least 1x/week - let sit on scalp at least 5 minutes prior to rinsing 120 mL 5    naftifine (NAFTIN) 2 % Gel Apply 1 application topically 2 (two) times a day. 1 Tube 1    olopatadine (PATADAY) 0.2 % Drop Place 1 drop into both eyes once daily. 2.5 mL 0    oxyCODONE-acetaminophen (PERCOCET)   mg per tablet Take 1 tablet by mouth every 6 (six) hours as needed for Pain. 18 tablet 0    terbinafine HCL (LAMISIL) 250 mg tablet Take 1 tablet (250 mg total) by mouth once daily. 1 pill by mouth x 90 days. Avoid alcohol intake while taking medication 90 tablet 0    [DISCONTINUED] ciclopirox (PENLAC) 8 % Soln Apply to affected toenails at night time DAILY. On 7th day, file nails down, clean all nails with alcohol and restart application process. 1 Bottle 10    clindamycin (CLEOCIN T) 1 % Swab Apply topically 2 (two) times daily. 60 each 5    fluocinolone (DERMA-SMOOTHE) 0.01 % external oil Apply oil to scalp twice a day prn 1 Bottle 5    fluticasone-salmeterol diskus inhaler 100-50 mcg Inhale 1 puff into the lungs 2 (two) times daily. Controller 60 each 5    lisdexamfetamine (VYVANSE) 30 MG capsule Take 1 capsule (30 mg total) by mouth once daily. 30 capsule 0    OXcarbazepine (TRILEPTAL) 300 MG Tab Take 1 tablet (300 mg total) by mouth nightly. 30 tablet 2    valACYclovir (VALTREX) 500 MG tablet Take 1 tablet (500 mg total) by mouth once daily. 30 tablet 12    ziprasidone (GEODON) 60 MG Cap TAKE 1 CAPSULE BY MOUTH EVERY NIGHT 30 capsule 2     No current facility-administered medications on file prior to visit.       PSH     Past Surgical History:   Procedure Laterality Date     SECTION      x3    CYST REMOVAL Right     Right arm     DILATION AND CURETTAGE OF UTERUS      TUBAL LIGATION          ALL  Review of patient's allergies indicates:   Allergen Reactions    Latex, natural rubber Itching    Morphine Itching, Other (See Comments) and Rash     Burning sensation    Tylenol [acetaminophen] Rash       SOC     Social History     Tobacco Use    Smoking status: Never    Smokeless tobacco: Never   Substance Use Topics    Alcohol use: Yes     Comment: occ    Drug use: No         Family HX      Family History   Problem Relation Age of Onset    Sleep apnea Father     No Known Problems Mother     Breast cancer Neg  "Hx     Colon cancer Neg Hx     Ovarian cancer Neg Hx     Uterine cancer Neg Hx     Cervical cancer Neg Hx           REVIEW OF SYSTEMS  General: This patient is well-developed, well-nourished and appears stated age, well-oriented to person, place and time, and cooperative and pleasant on today's visit  Constitutional: Negative for chills and fever.   Respiratory: Negative for shortness of breath.    Cardiovascular: Negative for chest pain, palpitations, orthopnea  Gastrointestinal: Negative for diarrhea, nausea and vomiting.   Musculoskeletal: Positive for above noted in HPI  Skin: Positive for skin and nail changes  Neurological: Negative for tingling and sensory changes  Peripheral Vascular: no claudication or cyanosis  Psychiatric/Behavioral: Negative for altered mental status         PHYSICAL EXAM:      Vitals:    03/30/23 1536   Weight: 89.8 kg (198 lb)   Height: 5' 2" (1.575 m)   PainSc: 0-No pain   PainLoc: Foot       General: This patient is well-developed, well-nourished and appears stated age, well-oriented to person, place and time, and cooperative and pleasant on today's visit    LOWER EXTREMITY PHYSICAL EXAM  VASCULAR  Dorsalis pedis and posterior tibial pulses palpable 2/4 bilaterally.   Capillary refill time immediate to the toes.   Feet are warm to the touch. Skin temperature warm to warm from proximally to distally   There are no varicosities, telangiectasias noted to bilateral foot and ankle regions.   There are no ecchymoses noted to bilateral foot and ankle regions.   There is no gross lower extremity edema.    DERMATOLOGIC  There are thickened discolored, elongated mycotic nails suggestive of onychomycosis 1, 5 bilateral   Skin moist with healthy texture and turgor.  There are no open ulcerations, lacerations, or fissures to bilateral foot and ankle regions. There are no signs of infection as there is no erythema, no proximal-extending lymphangiitis, no fluctuance, or crepitus noted on palpation " to bilateral foot and ankle regions.   There is  interdigital maceration with scales b/l feet   There are no hyperkeratotic lesions noted to feet.     NEUROLOGIC  Epicritic sensation is intact as the patient is able to sense light touch to bilateral foot and ankle regions.   Achilles and patellar deep tendon reflexes intact  Babinski reflex absent    ORTHOPEDIC/BIOMECHANICAL  No symptomatic structural abnormalities noted  Muscle strength AT/EHL/EDL/PT: 5/5; Achilles/Gastroc/Soleus: 5/5; PB/PL: 5/5 Muscle tone is normal.  Ankle joint ROM INTACT DF/PF, non-crepitus      ASSESSMENT   Dermatophytosis of nail    Nail disorder (onychogryphosis)    Tinea pedis of both feet    Other orders  -     ketoconazole (NIZORAL) 2 % cream; Apply topically once daily.  Dispense: 30 g; Refill: 1          PLAN    1. Patient was educated about clinical and imaging findings, and verbalizes understanding of above.  2. I Discussed treatment options for nail fungus.   -I explained that fungus lives in a warm dark moist environment and therefore patient should make every attempt to keep feet clean and dry.  We discussed drying feet thoroughly after shower particularly between the toes and then applying powder between the toes and in the shoes. I also discussed to disinfect shower tub, discard old shoes, and disinfect current shoes with antiseptic    -pt elects for permanent removal due to prior history of failing conservative therapy    -bilateral hallux permanent nail surgery    For the athletes foot, patient was prescribed antifungal to apply between the toes and to the bottoms of feet twice a day. In addition, recommendations were made to spray and disinfect shoes with Lysol and to alternate shoes. Also, if excessive sweating patient was instructed to use Arid Extra Dry spray on the bottom of the feet with Zeasorb powder in the digital interspaces.    3. RTC  for follow up/evaluation as scheduled      Report Electronically Signed By:      Nelli Rosario, NAYE   Podiatry  [facility]  3/30/2023

## 2023-04-17 ENCOUNTER — TELEPHONE (OUTPATIENT)
Dept: PRIMARY CARE CLINIC | Facility: CLINIC | Age: 33
End: 2023-04-17
Payer: COMMERCIAL

## 2023-04-17 ENCOUNTER — PATIENT MESSAGE (OUTPATIENT)
Dept: PRIMARY CARE CLINIC | Facility: CLINIC | Age: 33
End: 2023-04-17
Payer: COMMERCIAL

## 2023-04-17 NOTE — TELEPHONE ENCOUNTER
Called patient to schedule appointment for anxiety. Offered in clinic appointment in June, patient preferred virtual. Scheduled virtual appointment on 4/24/23 at 3:20 pm. She voiced understanding.

## 2023-04-24 ENCOUNTER — OFFICE VISIT (OUTPATIENT)
Dept: PRIMARY CARE CLINIC | Facility: CLINIC | Age: 33
End: 2023-04-24
Payer: MEDICAID

## 2023-04-24 DIAGNOSIS — E88.810 METABOLIC SYNDROME X: ICD-10-CM

## 2023-04-24 DIAGNOSIS — F41.1 GAD (GENERALIZED ANXIETY DISORDER): ICD-10-CM

## 2023-04-24 DIAGNOSIS — F41.0 PANIC ATTACKS: Primary | ICD-10-CM

## 2023-04-24 DIAGNOSIS — Z00.00 GENERAL MEDICAL EXAM: ICD-10-CM

## 2023-04-24 DIAGNOSIS — Z86.39 HISTORY OF METABOLIC AND NUTRITIONAL DISORDER: ICD-10-CM

## 2023-04-24 DIAGNOSIS — Z11.4 SCREENING FOR HIV (HUMAN IMMUNODEFICIENCY VIRUS): ICD-10-CM

## 2023-04-24 DIAGNOSIS — E66.9 OBESITY (BMI 35.0-39.9 WITHOUT COMORBIDITY): ICD-10-CM

## 2023-04-24 DIAGNOSIS — F32.A DEPRESSION, UNSPECIFIED DEPRESSION TYPE: ICD-10-CM

## 2023-04-24 DIAGNOSIS — Z13.6 ENCOUNTER FOR LIPID SCREENING FOR CARDIOVASCULAR DISEASE: ICD-10-CM

## 2023-04-24 DIAGNOSIS — Z13.220 ENCOUNTER FOR LIPID SCREENING FOR CARDIOVASCULAR DISEASE: ICD-10-CM

## 2023-04-24 PROCEDURE — 99214 PR OFFICE/OUTPT VISIT, EST, LEVL IV, 30-39 MIN: ICD-10-PCS | Mod: 95,,, | Performed by: NURSE PRACTITIONER

## 2023-04-24 PROCEDURE — 99214 OFFICE O/P EST MOD 30 MIN: CPT | Mod: 95,,, | Performed by: NURSE PRACTITIONER

## 2023-04-24 PROCEDURE — 1160F RVW MEDS BY RX/DR IN RCRD: CPT | Mod: CPTII,95,, | Performed by: NURSE PRACTITIONER

## 2023-04-24 PROCEDURE — 1160F PR REVIEW ALL MEDS BY PRESCRIBER/CLIN PHARMACIST DOCUMENTED: ICD-10-PCS | Mod: CPTII,95,, | Performed by: NURSE PRACTITIONER

## 2023-04-24 PROCEDURE — 1159F PR MEDICATION LIST DOCUMENTED IN MEDICAL RECORD: ICD-10-PCS | Mod: CPTII,95,, | Performed by: NURSE PRACTITIONER

## 2023-04-24 PROCEDURE — 1159F MED LIST DOCD IN RCRD: CPT | Mod: CPTII,95,, | Performed by: NURSE PRACTITIONER

## 2023-04-24 RX ORDER — LORAZEPAM 0.5 MG/1
0.5 TABLET ORAL EVERY 8 HOURS PRN
Qty: 30 TABLET | Refills: 0 | Status: SHIPPED | OUTPATIENT
Start: 2023-04-24 | End: 2023-08-28

## 2023-04-24 RX ORDER — SERTRALINE HYDROCHLORIDE 25 MG/1
25 TABLET, FILM COATED ORAL DAILY
Qty: 30 TABLET | Refills: 3 | Status: SHIPPED | OUTPATIENT
Start: 2023-04-24 | End: 2023-08-28

## 2023-04-24 NOTE — PROGRESS NOTES
Subjective:       Patient ID: Hesham Palacios is a 33 y.o. female.    Chief Complaint: Depression/Anxiety and Panic Attacks.     History of Present Illness:   Hesham Palacios 33 y.o. female presents today with reports of depression/anxiety with panic attacks. Patient denies any homicidal or suicidal ideations at this time. Treatment options and alternatives were discussed with the patient. Patient provided opportunity to ask additional questions.  All questions were answered. Voices understanding and acceptance of this advice. Instructed to call back if any further questions or concerns.      Past Medical History:   Diagnosis Date    Anemia     Herpes simplex virus (HSV) infection     Hx of migraine headaches      Family History   Problem Relation Age of Onset    Sleep apnea Father     No Known Problems Mother     Breast cancer Neg Hx     Colon cancer Neg Hx     Ovarian cancer Neg Hx     Uterine cancer Neg Hx     Cervical cancer Neg Hx      Social History     Socioeconomic History    Marital status: Significant Other   Tobacco Use    Smoking status: Never    Smokeless tobacco: Never   Substance and Sexual Activity    Alcohol use: Yes     Comment: occ    Drug use: No    Sexual activity: Yes     Partners: Male     Birth control/protection: See Surgical Hx     Comment: BTL      Outpatient Encounter Medications as of 4/24/2023   Medication Sig Dispense Refill    albuterol (PROVENTIL/VENTOLIN HFA) 90 mcg/actuation inhaler Inhale 2 puffs into the lungs every 4 (four) hours as needed for Wheezing (cough). 18 g 5    azelaic acid (FINACEA) 15 % gel AAA bid 50 g 3    clindamycin (CLEOCIN T) 1 % Swab Apply topically 2 (two) times daily. 60 each 5    fluocinolone (DERMA-SMOOTHE) 0.01 % external oil Apply oil to scalp twice a day prn 1 Bottle 5    fluticasone-salmeterol diskus inhaler 100-50 mcg Inhale 1 puff into the lungs 2 (two) times daily. Controller 60 each 5    hydroquinone 4 % Crea Apply to dark spots once daily. Use  with sunscreen if outdoors 28 g 1    ketoconazole (NIZORAL) 2 % cream Apply topically once daily. 30 g 1    ketoconazole (NIZORAL) 2 % shampoo Wash hair with medicated shampoo at least 1x/week - let sit on scalp at least 5 minutes prior to rinsing 120 mL 5    lisdexamfetamine (VYVANSE) 30 MG capsule Take 1 capsule (30 mg total) by mouth once daily. 30 capsule 0    LORazepam (ATIVAN) 0.5 MG tablet Take 1 tablet (0.5 mg total) by mouth every 8 (eight) hours as needed (anxiety). 30 tablet 0    naftifine (NAFTIN) 2 % Gel Apply 1 application topically 2 (two) times a day. 1 Tube 1    olopatadine (PATADAY) 0.2 % Drop Place 1 drop into both eyes once daily. 2.5 mL 0    OXcarbazepine (TRILEPTAL) 300 MG Tab Take 1 tablet (300 mg total) by mouth nightly. 30 tablet 2    oxyCODONE-acetaminophen (PERCOCET)  mg per tablet Take 1 tablet by mouth every 6 (six) hours as needed for Pain. 18 tablet 0    sertraline (ZOLOFT) 25 MG tablet Take 1 tablet (25 mg total) by mouth once daily. 30 tablet 3    terbinafine HCL (LAMISIL) 250 mg tablet Take 1 tablet (250 mg total) by mouth once daily. 1 pill by mouth x 90 days. Avoid alcohol intake while taking medication 90 tablet 0    valACYclovir (VALTREX) 500 MG tablet Take 1 tablet (500 mg total) by mouth once daily. 30 tablet 12    ziprasidone (GEODON) 60 MG Cap TAKE 1 CAPSULE BY MOUTH EVERY NIGHT 30 capsule 2    [DISCONTINUED] ciclopirox (PENLAC) 8 % Soln Apply to affected toenails at night time DAILY. On 7th day, file nails down, clean all nails with alcohol and restart application process. 1 Bottle 10     No facility-administered encounter medications on file as of 4/24/2023.       Review of Systems   HENT:  Negative for rhinorrhea and sore throat.    Respiratory:  Negative for wheezing.    Cardiovascular:  Negative for leg swelling.   Gastrointestinal:  Negative for abdominal pain and vomiting.   Musculoskeletal:  Negative for neck pain.   Skin:  Negative for rash.   Neurological:   Positive for headaches.   Psychiatric/Behavioral:  Positive for confusion, decreased concentration and sleep disturbance. The patient is nervous/anxious.      Objective:      There were no vitals taken for this visit.  Physical Exam  Constitutional:       Appearance: She is well-developed. She is obese.   HENT:      Head: Normocephalic.      Nose: Nose normal.   Eyes:      Pupils: Pupils are equal, round, and reactive to light.   Cardiovascular:      Rate and Rhythm: Normal rate.      Heart sounds: Normal heart sounds.   Pulmonary:      Effort: Pulmonary effort is normal.      Breath sounds: Normal breath sounds.   Abdominal:      General: Bowel sounds are normal.      Palpations: Abdomen is soft.   Musculoskeletal:         General: Normal range of motion.      Cervical back: Normal range of motion.   Skin:     General: Skin is warm and dry.   Neurological:      Mental Status: She is alert and oriented to person, place, and time.   Psychiatric:         Attention and Perception: Attention normal.         Mood and Affect: Mood is anxious and depressed.         Behavior: Behavior normal.         Thought Content: Thought content does not include homicidal or suicidal ideation. Thought content does not include homicidal plan.       Results for orders placed or performed in visit on 10/21/22   CBC Auto Differential   Result Value Ref Range    WBC 5.74 3.90 - 12.70 K/uL    RBC 4.16 4.00 - 5.40 M/uL    Hemoglobin 13.3 12.0 - 16.0 g/dL    Hematocrit 40.7 37.0 - 48.5 %    MCV 98 82 - 98 fL    MCH 32.0 (H) 27.0 - 31.0 pg    MCHC 32.7 32.0 - 36.0 g/dL    RDW 11.9 11.5 - 14.5 %    Platelets 317 150 - 450 K/uL    MPV 10.8 9.2 - 12.9 fL    Immature Granulocytes 0.2 0.0 - 0.5 %    Gran # (ANC) 2.2 1.8 - 7.7 K/uL    Immature Grans (Abs) 0.01 0.00 - 0.04 K/uL    Lymph # 2.9 1.0 - 4.8 K/uL    Mono # 0.5 0.3 - 1.0 K/uL    Eos # 0.2 0.0 - 0.5 K/uL    Baso # 0.03 0.00 - 0.20 K/uL    nRBC 0 0 /100 WBC    Gran % 38.0 38.0 - 73.0 %    Lymph  % 50.5 (H) 18.0 - 48.0 %    Mono % 7.8 4.0 - 15.0 %    Eosinophil % 3.0 0.0 - 8.0 %    Basophil % 0.5 0.0 - 1.9 %    Differential Method Automated    Comprehensive Metabolic Panel   Result Value Ref Range    Sodium 137 136 - 145 mmol/L    Potassium 3.7 3.5 - 5.1 mmol/L    Chloride 103 95 - 110 mmol/L    CO2 21 (L) 23 - 29 mmol/L    Glucose 69 (L) 70 - 110 mg/dL    BUN 12 6 - 20 mg/dL    Creatinine 0.8 0.5 - 1.4 mg/dL    Calcium 10.0 8.7 - 10.5 mg/dL    Total Protein 7.8 6.0 - 8.4 g/dL    Albumin 4.1 3.5 - 5.2 g/dL    Total Bilirubin 0.4 0.1 - 1.0 mg/dL    Alkaline Phosphatase 43 (L) 55 - 135 U/L    AST 23 10 - 40 U/L    ALT 18 10 - 44 U/L    Anion Gap 13 8 - 16 mmol/L    eGFR >60.0 >60 mL/min/1.73 m^2   Lipid Panel   Result Value Ref Range    Cholesterol 165 120 - 199 mg/dL    Triglycerides 98 30 - 150 mg/dL    HDL 45 40 - 75 mg/dL    LDL Cholesterol 100.4 63.0 - 159.0 mg/dL    HDL/Cholesterol Ratio 27.3 20.0 - 50.0 %    Total Cholesterol/HDL Ratio 3.7 2.0 - 5.0    Non-HDL Cholesterol 120 mg/dL   Hemoglobin A1C   Result Value Ref Range    Hemoglobin A1C 5.0 4.0 - 5.6 %    Estimated Avg Glucose 97 68 - 131 mg/dL   TSH   Result Value Ref Range    TSH 0.497 0.400 - 4.000 uIU/mL   T4, Free   Result Value Ref Range    Free T4 0.99 0.71 - 1.51 ng/dL   HIV 1/2 Ag/Ab (4th Gen)   Result Value Ref Range    HIV 1/2 Ag/Ab Non-reactive Non-reactive   Hepatitis C Antibody   Result Value Ref Range    Hepatitis C Ab Non-reactive Non-reactive     Assessment:       1. Panic attacks    2. ANA (generalized anxiety disorder)    3. Depression, unspecified depression type    4. Metabolic syndrome X    5. Obesity (BMI 35.0-39.9 without comorbidity)    6. History of metabolic and nutritional disorder    7. Encounter for lipid screening for cardiovascular disease    8. General medical exam    9. Screening for HIV (human immunodeficiency virus)        Plan:   Diagnoses and all orders for this visit:    Panic attacks  -     Ambulatory  referral/consult to Primary Care Behavioral Health (Non-Opioids); Future    ANA (generalized anxiety disorder)  -     Ambulatory referral/consult to Primary Care Behavioral Health (Non-Opioids); Future    Depression, unspecified depression type  -     Ambulatory referral/consult to Primary Care Behavioral Health (Non-Opioids); Future    Metabolic syndrome X  -     Insulin, random; Future    Obesity (BMI 35.0-39.9 without comorbidity)    History of metabolic and nutritional disorder  -     Hemoglobin A1C; Future  -     TSH; Future  -     T3, Free; Future  -     T4, Free; Future    Encounter for lipid screening for cardiovascular disease  -     Lipid Panel; Future    General medical exam  -     CBC Auto Differential; Future  -     Comprehensive Metabolic Panel; Future    Screening for HIV (human immunodeficiency virus)  -     HIV 1/2 Ag/Ab (4th Gen); Future    Other orders  -     sertraline (ZOLOFT) 25 MG tablet; Take 1 tablet (25 mg total) by mouth once daily.  -     LORazepam (ATIVAN) 0.5 MG tablet; Take 1 tablet (0.5 mg total) by mouth every 8 (eight) hours as needed (anxiety).              [facility]   [address]  [place] [address]  Office [phone]  Fax [phone]

## 2023-07-13 ENCOUNTER — PATIENT MESSAGE (OUTPATIENT)
Dept: PRIMARY CARE CLINIC | Facility: CLINIC | Age: 33
End: 2023-07-13
Payer: COMMERCIAL

## 2023-07-28 ENCOUNTER — LAB VISIT (OUTPATIENT)
Dept: LAB | Facility: HOSPITAL | Age: 33
End: 2023-07-28
Attending: NURSE PRACTITIONER
Payer: COMMERCIAL

## 2023-07-28 ENCOUNTER — OFFICE VISIT (OUTPATIENT)
Dept: PRIMARY CARE CLINIC | Facility: CLINIC | Age: 33
End: 2023-07-28
Payer: COMMERCIAL

## 2023-07-28 VITALS
BODY MASS INDEX: 37.98 KG/M2 | TEMPERATURE: 99 F | HEART RATE: 83 BPM | SYSTOLIC BLOOD PRESSURE: 122 MMHG | HEIGHT: 63 IN | OXYGEN SATURATION: 97 % | DIASTOLIC BLOOD PRESSURE: 82 MMHG | WEIGHT: 214.38 LBS

## 2023-07-28 DIAGNOSIS — Z11.59 ENCOUNTER FOR HEPATITIS C SCREENING TEST FOR LOW RISK PATIENT: ICD-10-CM

## 2023-07-28 DIAGNOSIS — Z00.00 ANNUAL PHYSICAL EXAM: ICD-10-CM

## 2023-07-28 DIAGNOSIS — R63.5 ABNORMAL WEIGHT GAIN: ICD-10-CM

## 2023-07-28 DIAGNOSIS — Z13.6 ENCOUNTER FOR LIPID SCREENING FOR CARDIOVASCULAR DISEASE: ICD-10-CM

## 2023-07-28 DIAGNOSIS — Z86.39 HISTORY OF METABOLIC AND NUTRITIONAL DISORDER: ICD-10-CM

## 2023-07-28 DIAGNOSIS — Z12.4 SCREENING FOR CERVICAL CANCER: ICD-10-CM

## 2023-07-28 DIAGNOSIS — Z00.00 GENERAL MEDICAL EXAM: ICD-10-CM

## 2023-07-28 DIAGNOSIS — F41.1 GAD (GENERALIZED ANXIETY DISORDER): ICD-10-CM

## 2023-07-28 DIAGNOSIS — G89.29 CHRONIC PAIN OF LEFT KNEE: Primary | ICD-10-CM

## 2023-07-28 DIAGNOSIS — Z13.220 ENCOUNTER FOR LIPID SCREENING FOR CARDIOVASCULAR DISEASE: ICD-10-CM

## 2023-07-28 DIAGNOSIS — M25.562 CHRONIC PAIN OF LEFT KNEE: Primary | ICD-10-CM

## 2023-07-28 DIAGNOSIS — Z11.4 SCREENING FOR HIV (HUMAN IMMUNODEFICIENCY VIRUS): ICD-10-CM

## 2023-07-28 LAB
ALBUMIN SERPL BCP-MCNC: 4.1 G/DL (ref 3.5–5.2)
ALP SERPL-CCNC: 43 U/L (ref 55–135)
ALT SERPL W/O P-5'-P-CCNC: 21 U/L (ref 10–44)
ANION GAP SERPL CALC-SCNC: 15 MMOL/L (ref 8–16)
AST SERPL-CCNC: 24 U/L (ref 10–40)
BASOPHILS # BLD AUTO: 0.03 K/UL (ref 0–0.2)
BASOPHILS NFR BLD: 0.5 % (ref 0–1.9)
BILIRUB SERPL-MCNC: 0.5 MG/DL (ref 0.1–1)
BUN SERPL-MCNC: 12 MG/DL (ref 6–20)
CALCIUM SERPL-MCNC: 9.4 MG/DL (ref 8.7–10.5)
CHLORIDE SERPL-SCNC: 104 MMOL/L (ref 95–110)
CHOLEST SERPL-MCNC: 189 MG/DL (ref 120–199)
CHOLEST/HDLC SERPL: 3.9 {RATIO} (ref 2–5)
CO2 SERPL-SCNC: 20 MMOL/L (ref 23–29)
CREAT SERPL-MCNC: 0.9 MG/DL (ref 0.5–1.4)
DIFFERENTIAL METHOD: ABNORMAL
EOSINOPHIL # BLD AUTO: 0.1 K/UL (ref 0–0.5)
EOSINOPHIL NFR BLD: 2 % (ref 0–8)
ERYTHROCYTE [DISTWIDTH] IN BLOOD BY AUTOMATED COUNT: 12.1 % (ref 11.5–14.5)
EST. GFR  (NO RACE VARIABLE): >60 ML/MIN/1.73 M^2
ESTIMATED AVG GLUCOSE: 103 MG/DL (ref 68–131)
GLUCOSE SERPL-MCNC: 60 MG/DL (ref 70–110)
HBA1C MFR BLD: 5.2 % (ref 4–5.6)
HCT VFR BLD AUTO: 41.4 % (ref 37–48.5)
HCV AB SERPL QL IA: NORMAL
HDLC SERPL-MCNC: 49 MG/DL (ref 40–75)
HDLC SERPL: 25.9 % (ref 20–50)
HGB BLD-MCNC: 13.4 G/DL (ref 12–16)
HIV 1+2 AB+HIV1 P24 AG SERPL QL IA: NORMAL
IMM GRANULOCYTES # BLD AUTO: 0.01 K/UL (ref 0–0.04)
IMM GRANULOCYTES NFR BLD AUTO: 0.2 % (ref 0–0.5)
LDLC SERPL CALC-MCNC: 122.6 MG/DL (ref 63–159)
LYMPHOCYTES # BLD AUTO: 3.1 K/UL (ref 1–4.8)
LYMPHOCYTES NFR BLD: 52.6 % (ref 18–48)
MCH RBC QN AUTO: 31.8 PG (ref 27–31)
MCHC RBC AUTO-ENTMCNC: 32.4 G/DL (ref 32–36)
MCV RBC AUTO: 98 FL (ref 82–98)
MONOCYTES # BLD AUTO: 0.5 K/UL (ref 0.3–1)
MONOCYTES NFR BLD: 8 % (ref 4–15)
NEUTROPHILS # BLD AUTO: 2.2 K/UL (ref 1.8–7.7)
NEUTROPHILS NFR BLD: 36.7 % (ref 38–73)
NONHDLC SERPL-MCNC: 140 MG/DL
NRBC BLD-RTO: 0 /100 WBC
PLATELET # BLD AUTO: 284 K/UL (ref 150–450)
PMV BLD AUTO: 10.3 FL (ref 9.2–12.9)
POTASSIUM SERPL-SCNC: 3.6 MMOL/L (ref 3.5–5.1)
PROT SERPL-MCNC: 7.6 G/DL (ref 6–8.4)
RBC # BLD AUTO: 4.21 M/UL (ref 4–5.4)
SODIUM SERPL-SCNC: 139 MMOL/L (ref 136–145)
T3FREE SERPL-MCNC: 2.7 PG/ML (ref 2.3–4.2)
T4 FREE SERPL-MCNC: 0.97 NG/DL (ref 0.71–1.51)
TRIGL SERPL-MCNC: 87 MG/DL (ref 30–150)
TSH SERPL DL<=0.005 MIU/L-ACNC: 0.67 UIU/ML (ref 0.4–4)
WBC # BLD AUTO: 5.97 K/UL (ref 3.9–12.7)

## 2023-07-28 PROCEDURE — 3044F PR MOST RECENT HEMOGLOBIN A1C LEVEL <7.0%: ICD-10-PCS | Mod: CPTII,S$GLB,, | Performed by: NURSE PRACTITIONER

## 2023-07-28 PROCEDURE — 99999 PR PBB SHADOW E&M-EST. PATIENT-LVL V: ICD-10-PCS | Mod: PBBFAC,,, | Performed by: NURSE PRACTITIONER

## 2023-07-28 PROCEDURE — 80053 COMPREHEN METABOLIC PANEL: CPT | Performed by: NURSE PRACTITIONER

## 2023-07-28 PROCEDURE — 1160F RVW MEDS BY RX/DR IN RCRD: CPT | Mod: CPTII,S$GLB,, | Performed by: NURSE PRACTITIONER

## 2023-07-28 PROCEDURE — 83525 ASSAY OF INSULIN: CPT | Performed by: NURSE PRACTITIONER

## 2023-07-28 PROCEDURE — 3079F PR MOST RECENT DIASTOLIC BLOOD PRESSURE 80-89 MM HG: ICD-10-PCS | Mod: CPTII,S$GLB,, | Performed by: NURSE PRACTITIONER

## 2023-07-28 PROCEDURE — 3074F PR MOST RECENT SYSTOLIC BLOOD PRESSURE < 130 MM HG: ICD-10-PCS | Mod: CPTII,S$GLB,, | Performed by: NURSE PRACTITIONER

## 2023-07-28 PROCEDURE — 80061 LIPID PANEL: CPT | Performed by: NURSE PRACTITIONER

## 2023-07-28 PROCEDURE — 1160F PR REVIEW ALL MEDS BY PRESCRIBER/CLIN PHARMACIST DOCUMENTED: ICD-10-PCS | Mod: CPTII,S$GLB,, | Performed by: NURSE PRACTITIONER

## 2023-07-28 PROCEDURE — 85025 COMPLETE CBC W/AUTO DIFF WBC: CPT | Performed by: NURSE PRACTITIONER

## 2023-07-28 PROCEDURE — 1159F MED LIST DOCD IN RCRD: CPT | Mod: CPTII,S$GLB,, | Performed by: NURSE PRACTITIONER

## 2023-07-28 PROCEDURE — 3008F PR BODY MASS INDEX (BMI) DOCUMENTED: ICD-10-PCS | Mod: CPTII,S$GLB,, | Performed by: NURSE PRACTITIONER

## 2023-07-28 PROCEDURE — 84481 FREE ASSAY (FT-3): CPT | Performed by: NURSE PRACTITIONER

## 2023-07-28 PROCEDURE — 3079F DIAST BP 80-89 MM HG: CPT | Mod: CPTII,S$GLB,, | Performed by: NURSE PRACTITIONER

## 2023-07-28 PROCEDURE — 87389 HIV-1 AG W/HIV-1&-2 AB AG IA: CPT | Performed by: NURSE PRACTITIONER

## 2023-07-28 PROCEDURE — 84439 ASSAY OF FREE THYROXINE: CPT | Performed by: NURSE PRACTITIONER

## 2023-07-28 PROCEDURE — 3044F HG A1C LEVEL LT 7.0%: CPT | Mod: CPTII,S$GLB,, | Performed by: NURSE PRACTITIONER

## 2023-07-28 PROCEDURE — 84443 ASSAY THYROID STIM HORMONE: CPT | Performed by: NURSE PRACTITIONER

## 2023-07-28 PROCEDURE — 83036 HEMOGLOBIN GLYCOSYLATED A1C: CPT | Performed by: NURSE PRACTITIONER

## 2023-07-28 PROCEDURE — 3074F SYST BP LT 130 MM HG: CPT | Mod: CPTII,S$GLB,, | Performed by: NURSE PRACTITIONER

## 2023-07-28 PROCEDURE — 99214 OFFICE O/P EST MOD 30 MIN: CPT | Mod: S$GLB,,, | Performed by: NURSE PRACTITIONER

## 2023-07-28 PROCEDURE — 3008F BODY MASS INDEX DOCD: CPT | Mod: CPTII,S$GLB,, | Performed by: NURSE PRACTITIONER

## 2023-07-28 PROCEDURE — 86803 HEPATITIS C AB TEST: CPT | Performed by: NURSE PRACTITIONER

## 2023-07-28 PROCEDURE — 99214 PR OFFICE/OUTPT VISIT, EST, LEVL IV, 30-39 MIN: ICD-10-PCS | Mod: S$GLB,,, | Performed by: NURSE PRACTITIONER

## 2023-07-28 PROCEDURE — 36415 COLL VENOUS BLD VENIPUNCTURE: CPT | Mod: PN | Performed by: NURSE PRACTITIONER

## 2023-07-28 PROCEDURE — 99999 PR PBB SHADOW E&M-EST. PATIENT-LVL V: CPT | Mod: PBBFAC,,, | Performed by: NURSE PRACTITIONER

## 2023-07-28 PROCEDURE — 1159F PR MEDICATION LIST DOCUMENTED IN MEDICAL RECORD: ICD-10-PCS | Mod: CPTII,S$GLB,, | Performed by: NURSE PRACTITIONER

## 2023-07-31 ENCOUNTER — PATIENT MESSAGE (OUTPATIENT)
Dept: PRIMARY CARE CLINIC | Facility: CLINIC | Age: 33
End: 2023-07-31
Payer: COMMERCIAL

## 2023-07-31 LAB
INSULIN COLLECTION INTERVAL: NORMAL
INSULIN SERPL-ACNC: 8.1 UU/ML

## 2023-08-01 ENCOUNTER — PATIENT MESSAGE (OUTPATIENT)
Dept: PRIMARY CARE CLINIC | Facility: CLINIC | Age: 33
End: 2023-08-01
Payer: COMMERCIAL

## 2023-08-13 NOTE — PROGRESS NOTES
Subjective:       Patient ID: Hesham Palacios is a 33 y.o. female.    Chief Complaint: Left Knee pain, abnormal weight gain     History of Present Illness:   Hesham Palacios 33 y.o. female presents today with reports of left knee pain that has been present for over 6 months with no improvement with self treatment. Will order a physical therapy evaluation and treatment if indicated. Patient reports abnormal weight gain in past year that has been present for over 6 months. Treatment options and alternatives were discussed with the patient. Patient provided opportunity to ask additional questions.  All questions were answered. Voices understanding and acceptance of this advice. Instructed to call back if any further questions or concerns.      Past Medical History:   Diagnosis Date    Anemia     Herpes simplex virus (HSV) infection     Hx of migraine headaches      Family History   Problem Relation Age of Onset    Sleep apnea Father     No Known Problems Mother     Breast cancer Neg Hx     Colon cancer Neg Hx     Ovarian cancer Neg Hx     Uterine cancer Neg Hx     Cervical cancer Neg Hx      Social History     Socioeconomic History    Marital status: Significant Other   Tobacco Use    Smoking status: Never    Smokeless tobacco: Never   Substance and Sexual Activity    Alcohol use: Yes     Comment: occ    Drug use: No    Sexual activity: Yes     Partners: Male     Birth control/protection: See Surgical Hx     Comment: BTL      Social Determinants of Health     Financial Resource Strain: Low Risk  (10/29/2021)    Overall Financial Resource Strain (CARDIA)     Difficulty of Paying Living Expenses: Not hard at all   Food Insecurity: No Food Insecurity (10/29/2021)    Hunger Vital Sign     Worried About Running Out of Food in the Last Year: Never true     Ran Out of Food in the Last Year: Never true   Transportation Needs: No Transportation Needs (10/29/2021)    PRAPARE - Transportation     Lack of Transportation  (Medical): No     Lack of Transportation (Non-Medical): No   Stress: No Stress Concern Present (10/29/2021)    [employer] - Occupational Stress Questionnaire     Feeling of Stress : Not at all   Social Connections: Moderately Integrated (10/29/2021)    Social Connection and Isolation Panel [NHANES]     Frequency of Communication with Friends and Family: Twice a week     Frequency of Social Gatherings with Friends and Family: Twice a week     Attends Buddhist Services: More than 4 times per year     Active Member of Clubs or Organizations: Yes     Attends Club or Organization Meetings: More than 4 times per year     Marital Status: Never    Housing Stability: Low Risk  (10/29/2021)    Housing Stability Vital Sign     Unable to Pay for Housing in the Last Year: No     Number of Places Lived in the Last Year: 1     Unstable Housing in the Last Year: No     Outpatient Encounter Medications as of 7/28/2023   Medication Sig Dispense Refill    albuterol (PROVENTIL/VENTOLIN HFA) 90 mcg/actuation inhaler Inhale 2 puffs into the lungs every 4 (four) hours as needed for Wheezing (cough). 18 g 5    azelaic acid (FINACEA) 15 % gel AAA bid (Patient not taking: Reported on 7/28/2023) 50 g 3    clindamycin (CLEOCIN T) 1 % Swab Apply topically 2 (two) times daily. 60 each 5    fluocinolone (DERMA-SMOOTHE) 0.01 % external oil Apply oil to scalp twice a day prn 1 Bottle 5    fluticasone-salmeterol diskus inhaler 100-50 mcg Inhale 1 puff into the lungs 2 (two) times daily. Controller 60 each 5    hydroquinone 4 % Crea Apply to dark spots once daily. Use with sunscreen if outdoors (Patient not taking: Reported on 7/28/2023) 28 g 1    ketoconazole (NIZORAL) 2 % cream Apply topically once daily. (Patient not taking: Reported on 7/28/2023) 30 g 1    ketoconazole (NIZORAL) 2 % shampoo Wash hair with medicated shampoo at least 1x/week - let sit on scalp at least 5 minutes prior to rinsing (Patient not  taking: Reported on 7/28/2023) 120 mL 5    lisdexamfetamine (VYVANSE) 30 MG capsule Take 1 capsule (30 mg total) by mouth once daily. (Patient not taking: Reported on 7/28/2023) 30 capsule 0    LORazepam (ATIVAN) 0.5 MG tablet Take 1 tablet (0.5 mg total) by mouth every 8 (eight) hours as needed (anxiety). (Patient not taking: Reported on 7/28/2023) 30 tablet 0    naftifine (NAFTIN) 2 % Gel Apply 1 application topically 2 (two) times a day. (Patient not taking: Reported on 7/28/2023) 1 Tube 1    olopatadine (PATADAY) 0.2 % Drop Place 1 drop into both eyes once daily. (Patient not taking: Reported on 7/28/2023) 2.5 mL 0    OXcarbazepine (TRILEPTAL) 300 MG Tab Take 1 tablet (300 mg total) by mouth nightly. (Patient not taking: Reported on 7/28/2023) 30 tablet 2    oxyCODONE-acetaminophen (PERCOCET)  mg per tablet Take 1 tablet by mouth every 6 (six) hours as needed for Pain. (Patient not taking: Reported on 7/28/2023) 18 tablet 0    sertraline (ZOLOFT) 25 MG tablet Take 1 tablet (25 mg total) by mouth once daily. (Patient not taking: Reported on 7/28/2023) 30 tablet 3    terbinafine HCL (LAMISIL) 250 mg tablet Take 1 tablet (250 mg total) by mouth once daily. 1 pill by mouth x 90 days. Avoid alcohol intake while taking medication (Patient not taking: Reported on 7/28/2023) 90 tablet 0    valACYclovir (VALTREX) 500 MG tablet Take 1 tablet (500 mg total) by mouth once daily. (Patient not taking: Reported on 7/28/2023) 30 tablet 12    ziprasidone (GEODON) 60 MG Cap TAKE 1 CAPSULE BY MOUTH EVERY NIGHT (Patient not taking: Reported on 7/28/2023) 30 capsule 2    [DISCONTINUED] ciclopirox (PENLAC) 8 % Soln Apply to affected toenails at night time DAILY. On 7th day, file nails down, clean all nails with alcohol and restart application process. 1 Bottle 10     No facility-administered encounter medications on file as of 7/28/2023.       Review of Systems   Constitutional:  Negative for appetite change, chills and fever.  "  HENT:  Negative for ear pain, sinus pressure, sore throat and trouble swallowing.    Eyes:  Negative for visual disturbance.   Respiratory:  Negative for shortness of breath.    Cardiovascular:  Negative for chest pain.   Gastrointestinal:  Negative for abdominal pain, diarrhea, nausea and vomiting.   Endocrine: Negative for cold intolerance, polyphagia and polyuria.   Genitourinary:  Negative for decreased urine volume and dysuria.   Musculoskeletal:  Positive for arthralgias (left knee pain). Negative for back pain.   Skin:  Negative for rash.   Allergic/Immunologic: Negative for environmental allergies and food allergies.   Neurological:  Negative for dizziness, tremors, weakness and numbness.   Hematological:  Does not bruise/bleed easily.   Psychiatric/Behavioral:  Negative for confusion and hallucinations. The patient is not nervous/anxious and is not hyperactive.    All other systems reviewed and are negative.      Objective:      /82 (BP Location: Left arm, Patient Position: Sitting, BP Method: Large (Manual))   Pulse 83   Temp 98.7 °F (37.1 °C) (Oral)   Ht 5' 3" (1.6 m)   Wt 97.3 kg (214 lb 6.4 oz)   SpO2 97%   BMI 37.98 kg/m²   Physical Exam  Vitals and nursing note reviewed.   Constitutional:       General: She is not in acute distress.     Appearance: Normal appearance. She is normal weight. She is not ill-appearing or toxic-appearing.   Cardiovascular:      Rate and Rhythm: Normal rate and regular rhythm.      Pulses: Normal pulses.      Heart sounds: Normal heart sounds.   Pulmonary:      Effort: Pulmonary effort is normal.      Breath sounds: Normal breath sounds.   Musculoskeletal:      Right knee: Normal.      Left knee: Crepitus present. Tenderness present.   Neurological:      Mental Status: She is alert.         Results for orders placed or performed in visit on 10/21/22   CBC Auto Differential   Result Value Ref Range    WBC 5.74 3.90 - 12.70 K/uL    RBC 4.16 4.00 - 5.40 M/uL    " Hemoglobin 13.3 12.0 - 16.0 g/dL    Hematocrit 40.7 37.0 - 48.5 %    MCV 98 82 - 98 fL    MCH 32.0 (H) 27.0 - 31.0 pg    MCHC 32.7 32.0 - 36.0 g/dL    RDW 11.9 11.5 - 14.5 %    Platelets 317 150 - 450 K/uL    MPV 10.8 9.2 - 12.9 fL    Immature Granulocytes 0.2 0.0 - 0.5 %    Gran # (ANC) 2.2 1.8 - 7.7 K/uL    Immature Grans (Abs) 0.01 0.00 - 0.04 K/uL    Lymph # 2.9 1.0 - 4.8 K/uL    Mono # 0.5 0.3 - 1.0 K/uL    Eos # 0.2 0.0 - 0.5 K/uL    Baso # 0.03 0.00 - 0.20 K/uL    nRBC 0 0 /100 WBC    Gran % 38.0 38.0 - 73.0 %    Lymph % 50.5 (H) 18.0 - 48.0 %    Mono % 7.8 4.0 - 15.0 %    Eosinophil % 3.0 0.0 - 8.0 %    Basophil % 0.5 0.0 - 1.9 %    Differential Method Automated    Comprehensive Metabolic Panel   Result Value Ref Range    Sodium 137 136 - 145 mmol/L    Potassium 3.7 3.5 - 5.1 mmol/L    Chloride 103 95 - 110 mmol/L    CO2 21 (L) 23 - 29 mmol/L    Glucose 69 (L) 70 - 110 mg/dL    BUN 12 6 - 20 mg/dL    Creatinine 0.8 0.5 - 1.4 mg/dL    Calcium 10.0 8.7 - 10.5 mg/dL    Total Protein 7.8 6.0 - 8.4 g/dL    Albumin 4.1 3.5 - 5.2 g/dL    Total Bilirubin 0.4 0.1 - 1.0 mg/dL    Alkaline Phosphatase 43 (L) 55 - 135 U/L    AST 23 10 - 40 U/L    ALT 18 10 - 44 U/L    Anion Gap 13 8 - 16 mmol/L    eGFR >60.0 >60 mL/min/1.73 m^2   Lipid Panel   Result Value Ref Range    Cholesterol 165 120 - 199 mg/dL    Triglycerides 98 30 - 150 mg/dL    HDL 45 40 - 75 mg/dL    LDL Cholesterol 100.4 63.0 - 159.0 mg/dL    HDL/Cholesterol Ratio 27.3 20.0 - 50.0 %    Total Cholesterol/HDL Ratio 3.7 2.0 - 5.0    Non-HDL Cholesterol 120 mg/dL   Hemoglobin A1C   Result Value Ref Range    Hemoglobin A1C 5.0 4.0 - 5.6 %    Estimated Avg Glucose 97 68 - 131 mg/dL   TSH   Result Value Ref Range    TSH 0.497 0.400 - 4.000 uIU/mL   T4, Free   Result Value Ref Range    Free T4 0.99 0.71 - 1.51 ng/dL   HIV 1/2 Ag/Ab (4th Gen)   Result Value Ref Range    HIV 1/2 Ag/Ab Non-reactive Non-reactive   Hepatitis C Antibody   Result Value Ref Range     Hepatitis C Ab Non-reactive Non-reactive     Assessment:       1. Chronic pain of left knee    2. ANA (generalized anxiety disorder)    3. Abnormal weight gain    4. General medical exam    5. Screening for HIV (human immunodeficiency virus)    6. Encounter for hepatitis C screening test for low risk patient    7. Encounter for lipid screening for cardiovascular disease    8. Annual physical exam    9. History of metabolic and nutritional disorder    10. Screening for cervical cancer        Plan:   Hesham was seen today for follow-up and annual exam.    Diagnoses and all orders for this visit:    Chronic pain of left knee  -     Ambulatory referral/consult to Physical/Occupational Therapy; Future    ANA (generalized anxiety disorder)    Abnormal weight gain    General medical exam  -     CBC Auto Differential; Future  -     Comprehensive Metabolic Panel; Future    Screening for HIV (human immunodeficiency virus)  -     HIV 1/2 Ag/Ab (4th Gen); Future    Encounter for hepatitis C screening test for low risk patient  -     Hepatitis C Antibody; Future    Encounter for lipid screening for cardiovascular disease  -     Lipid Panel; Future    Annual physical exam    History of metabolic and nutritional disorder  -     Hemoglobin A1C; Future  -     TSH; Future  -     T3, Free; Future  -     T4, Free; Future  -     INSULIN, RANDOM; Future    Screening for cervical cancer  -     Ambulatory referral/consult to Gynecology; Future              [facility]   [address]  [place] [address]  Office [phone]  Fax [phone]

## 2023-08-23 ENCOUNTER — CLINICAL SUPPORT (OUTPATIENT)
Dept: REHABILITATION | Facility: HOSPITAL | Age: 33
End: 2023-08-23
Payer: COMMERCIAL

## 2023-08-23 DIAGNOSIS — R68.89 DECREASED STRENGTH, ENDURANCE, AND MOBILITY: ICD-10-CM

## 2023-08-23 DIAGNOSIS — M62.89 ABNORMAL INCREASED MUSCLE TONE: ICD-10-CM

## 2023-08-23 DIAGNOSIS — R53.1 DECREASED STRENGTH, ENDURANCE, AND MOBILITY: ICD-10-CM

## 2023-08-23 DIAGNOSIS — M25.562 CHRONIC PAIN OF LEFT KNEE: ICD-10-CM

## 2023-08-23 DIAGNOSIS — G89.29 CHRONIC PAIN OF LEFT KNEE: ICD-10-CM

## 2023-08-23 DIAGNOSIS — Z74.09 DECREASED STRENGTH, ENDURANCE, AND MOBILITY: ICD-10-CM

## 2023-08-23 PROCEDURE — 97110 THERAPEUTIC EXERCISES: CPT

## 2023-08-23 PROCEDURE — 97112 NEUROMUSCULAR REEDUCATION: CPT

## 2023-08-23 PROCEDURE — 97162 PT EVAL MOD COMPLEX 30 MIN: CPT

## 2023-08-23 NOTE — PLAN OF CARE
[facility]   Physical Therapy Initial Evaluation      Date: 8/23/2023   Name: Hesham Ordonez  Clinic Number: [id number]    Therapy Diagnosis:    Encounter Diagnoses   Name Primary?    Chronic pain of left knee     Decreased strength, endurance, and mobility     Abnormal increased muscle tone       Physician: Zoya Fuller*     Physician Orders: PT Eval and Treat  Medical Diagnosis from Referral: Chronic pain of left knee [M25.562, G89.29]  Evaluation Date: 8/23/2023  Authorization Period Expiration: 7/27/2024  Plan of Care Expiration: 11/23/2023  Progress Note Due: 9/23/2023  Visit # / Visits authorized: 1/1   FOTO: 1/3 (last performed on 8/23/2023)    Precautions: Standard    Time In: 1357  Time Out: 1440  Total Billable Time (timed & untimed codes): 43 minutes    Subjective     Date of onset: 3+ years     History of current condition - Hesham reports that her left knee has been bothering for years but only when she is performing more strenuous physical activity such as jogging, squatting, lunging, jumping etc. Patient reports that she does not really feel the pain when she is walking or with regular daily activities. Patient reports that she went for a jog this morning and noticed that her left knee was popping and she almost immediately has increased pain but tries to push through the pain.   Patient reports that she played sports in high school (basketball and track) and does report history of knee injury but is unsure of which knee this occurred in.     Patient is exercising approximately 3 times per year. Patient began ramping up her workouts around February 2023. Patient reports that during and a few hours after each workout she has increased pain.     Falls: No falls reported     Imaging: No imaging present for current impairments    Pain:  Current 0/10, worst 8/10, best 0/10   Location: [] Right   [x] Left:  knee  Description: throbbing and sharp  Aggravating  Factors: strenuous activities   Easing Factors: activity avoidance, rest    Prior Therapy:   [] N/A    [] Yes:   Social History: Pt lives with their spouse and 3 children.   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 In February she switched professions, was previously working in a plant and performing less dedicated physical activity.   Prior Level of Function: Independent and pain free with all ADL, IADL, community mobility and functional activities.   Current Level of Function: Independent with all ADL, IADL, community mobility and functional activities with reports of increased pain and need for increased time and frequent breaks. Patient reports increased pain when performing more strenuous physical activity such as jogging, squatting, lunging, jumping etc.     Dominant Extremity:    [x] Right Legged    [x] Left Handed    Pts goals: Pt reported goals are to decrease overall pain levels in order to return to prior functional level. Patient would like to occasionally go jogging with her .     Medical History:   Past Medical History:   Diagnosis Date    Anemia     Herpes simplex virus (HSV) infection     Hx of migraine headaches        Surgical History:   Hesham Ordonez  has a past surgical history that includes Dilation and curettage of uterus;  section; Tubal ligation; and Cyst Removal (Right).    Medications:   Hesham has a current medication list which includes the following prescription(s): albuterol, azelaic acid, clindamycin, fluocinolone, fluticasone-salmeterol 100-50 mcg/dose, hydroquinone, ketoconazole, ketoconazole, lisdexamfetamine, lorazepam, naftin, olopatadine, oxcarbazepine, oxycodone-acetaminophen, sertraline, terbinafine hcl, valacyclovir, ziprasidone, and [DISCONTINUED] ciclopirox.    Allergies:   Review of patient's allergies indicates:   Allergen Reactions    Latex, natural rubber  Itching    Morphine Itching, Other (See Comments) and Rash     Burning sensation    Tylenol [acetaminophen] Rash        Objective        RANGE OF MOTION:     Screened Hip ROM with normal mobility and pain free range of motion noted     Knee AROM Right Left Pain/Dysfunction with Movement Goal   Knee ROM 5-0-120 5-0-125 Pain left  5-0-125 No pain     STRENGTH:   L/E MMT Right  (spine) Left Pain/Dysfunction with Movement Goal   Abdominal Strength with the ability to abdominal brace Poor ---  Modified (90/90): Good   Hip Flexion  4+/5 4-/5 Pain left 4+/5 B   Hip Extension  4-/5 3+/5 Pain left 4+/5 B   Hip Abduction  4-/5 3+/5 Pain left  4+/5 B   Hip Adduction 4-/5 3+/5 Pain left  4+/5 B   Knee Extension 4+/5 4/5 Pain left  5/5 B   Knee Flexion 4-/5 3+/5 Pain left  5/5 B   Hip IR 4-/5 3+/5 Pain left  4+/5 B   Hip ER 4-/5 3+/5 Pain left  4+/5 B     MUSCLE LENGTH:   Muscle Tested  Right Left  Goal   Hip Flexors [] Normal  [x] Limited [] Normal  [x] Limited Normal B   ITB / TFL [x] Normal  [] Limited [] Normal  [x] Limited Normal B   Quadriceps [x] Normal  [] Limited [] Normal  [x] Limited Normal B   Hamstrings  [] Normal  [x] Limited [] Normal  [x] Limited Normal B   Gastrocnemius  [] Normal  [x] Limited [] Normal  [x] Limited Normal B     JOINT MOBILITY:   Joint Motion Right Mobility  (spine) Left Mobility Goal   Distal Femur AP [] Hypo     [x] Normal     [] Hyper [] Hypo     [x] Normal     [] Hyper Normal    Proximal Tibia AP [] Hypo     [x] Normal     [] Hyper [] Hypo     [x] Normal     [] Hyper Normal    Patellar Medial Glide  [] Hypo     [x] Normal     [] Hyper [] Hypo     [x] Normal*     [] Hyper Normal    Patellar Lateral Glide  [] Hypo     [x] Normal     [] Hyper [] Hypo     [x] Normal*     [] Hyper Normal    Patellar Superior Glide  [] Hypo     [x] Normal    [] Hyper [] Hypo     [x] Normal*     [] Hyper Normal    Patellar Inferior Glide  [] Hypo     [x] Normal     [] Hyper [] Hypo     [x] Normal*     [] Hyper  Normal      SPECIAL TESTS:    Right  (spine) Left  Goal   Medina Test [] Positive    [x] Negative [] Positive    [x] Negative Negative B    Thessaly  [] Positive    [x] Negative [] Positive    [x] Negative Negative B      SENSATION  [x] Intact to Light Touch   [] Impaired:    PALPATION: Muscles: Increased tone and tenderness to palpation of: left hip flexors, quadriceps. Structures: Increased tenderness to palpation of: left LOWER STRUCTURES : fat pad of knee, patellar tendon Edema noted grossly along joint line of left knee       POSTURE:  Pt presents with postural abnormalities which include:    [x] Forward Head   [] Increased Lumbar Lordosis   [x] Rounded Shoulder   [] Genu Recurvatum   [] Increased Thoracic Kyphosis [] Genu Valgus   [] Trunk Deviated    [] Pes Planus   [] Scapular Winging    [] Other:   Function:     Intake Outcome Measure for FOTO Knee Survey    Therapist reviewed FOTO scores for Hesham on 8/23/2023.   FOTO report - see Media section or FOTO account for episode details    Intake Score: 50         Treatment     Total Treatment time (time-based codes) separate from Evaluation: (22) minutes     Hesham received the treatments listed below:     THERAPEUTIC EXERCISES to develop strength, endurance, ROM, flexibility, posture, and core stabilization for (12) minutes including:    Intervention Performed Today    Educated patient on the impairments present and the plan of care to address this x    Educated patient on the proper form and sequencing of all exercises provided in HEP today  x X5 reps performed of all exercises. See Patient Instructions                                    Plan for Next Visit: Upright Bike,      NEUROMUSCULAR RE-EDUCATION ACTIVITIES to improve Balance, Coordination, Kinesthetic, Sense, Proprioception, and Posture for (10) minutes.  The following were included:    Intervention Performed Today    Educated patient on exercise modification to allow for 4/10 pain or less  with whatever physical activity she is performing x Including modifying running to walking now   Educated patient on importance of de-loading left knee to allow for isolated strengthening of weakened muscle groups to allow for progressive loading with less overall pain x                Plan for Next Visit: review hip series, abdominal bracing, bridges       Patient Education and Home Exercises     Education provided: (included billable time) minutes  PURPOSE: Patient educated on the impairments noted above and the effects of physical therapy intervention to improve overall condition and QOL.   EXERCISE: Patient was educated on all the above exercise prior/during/after for proper posture, positioning, and execution for safe performance with home exercise program.   STRENGTH: Patient educated on the importance of improved core and extremity strength in order to improve alignment of the spine and extremities with static positions and dynamic movement.     Written Home Exercises Provided: yes.  Exercises were reviewed and Hesham was able to demonstrate them prior to the end of the session.  Hesham demonstrated good  understanding of the education provided. See EMR under Patient Instructions for exercises provided during therapy sessions.    Assessment     Hesham is a 33 y.o. female referred to outpatient Physical Therapy with a medical diagnosis of Chronic pain of left knee [M25.562, G89.29]. Pt presents with impairments in the following categories: IMPAIRMENTS: ROM, strength, endurance, joint mobility, muscle length, gait mechanics, core strength and stability, functional movement patterns, and edema resulting in decreased quality of life and functional independence as she is has young boys that she is trying to keep up with and play with on a daily basis with pain present along with trying to lose weight but due to knee pain is having some restrictions with this.     Pt prognosis is Good  Pt will benefit from  "skilled outpatient Physical Therapy to address the deficits stated above and in the chart below, provide pt/family education, and to maximize pt's level of independence.     Plan of care discussed with patient: Yes  Pt's spiritual, cultural and educational needs considered and patient is agreeable to the plan of care and goals as stated below:     Anticipated Barriers for therapy: co-morbidities and chronicity of condition    Medical Necessity is demonstrated by the following  History  Co-morbidities and personal factors that may impact the plan of care [] LOW: no personal factors / co-morbidities  [] MODERATE: 1-2 personal factors / co-morbidities  [x] HIGH: 3+ personal factors / co-morbidities    Moderate / High Support Documentation:   Past Medical History:   Diagnosis Date    Anemia     Herpes simplex virus (HSV) infection     Hx of migraine headaches    History of  sections with childbirth, generalized anxiety disorder, PTSD, major depressive disorder, mood disorder      Examination  Body Structures and Functions, activity limitations and participation restrictions that may impact the plan of care [] LOW: addressing 1-2 elements  [x] MODERATE: 3+ elements  [] HIGH: 4+ elements (please support below)    Moderate / High Support Documentation: See above in "Current Level of Function"      Clinical Presentation [] LOW: stable  [x] MODERATE: Evolving  [] HIGH: Unstable     Decision Making/ Complexity Score: moderate         Short Term Goals:  6 weeks Status  Date Met   PAIN: Pt will report worst pain of 5/10 in order to progress toward max functional ability and improve quality of life. [x] Progressing  [] Met  [] Not Met    FUNCTION: Patient will demonstrate improved function as indicated by a score of greater than or equal to 57 out of 100 on FOTO. [x] Progressing  [] Met  [] Not Met    MOBILITY: Patient will improve AROM to stated goals, listed in objective measures above, in order to progress towards " independence with functional activities.  [x] Progressing  [] Met  [] Not Met    STRENGTH: Patient will improve strength to 50% of stated goals, listed in objective measures above, in order to progress towards independence with functional activities. [x] Progressing  [] Met  [] Not Met    HEP: Patient will demonstrate independence with HEP in order to progress toward functional independence. [x] Progressing  [] Met  [] Not Met    Patient will begin light weight training with 4/10 pain or less to progress towards prior level of function and improve overall quality of life.  [x] Progressing  [] Met  [] Not Met      Long Term Goals:  12 weeks Status Date Met   PAIN: Pt will report worst pain of 1/10 in order to progress toward max functional ability and improve quality of life [x] Progressing  [] Met  [] Not Met    FUNCTION: Patient will demonstrate improved function as indicated by a score of greater than or equal to 66 out of 100 on FOTO. [x] Progressing  [] Met  [] Not Met    STRENGTH: Patient will improve strength to stated goals, listed in objective measures above, in order to improve functional independence and quality of life. [x] Progressing  [] Met  [] Not Met    Patient will return to normal ADL's, IADL's, community involvement, recreational activities, and work-related activities with less than or equal to 1/10 pain and maximal function.  [x] Progressing  [] Met  [] Not Met      Plan     Plan of care Certification: 8/23/2023 to 11/23/2023.    Outpatient Physical Therapy 2 times weekly for 12 weeks to include any combination of the following interventions: virtual visits, dry needling, modalities, electrical stimulation (IFC, Pre-Mod, Attended with Functional Dry Needling), Aquatic Therapy, Cervical/Lumbar Traction, Electrical Stimulation unattended/attended, Gait Training, Manual Therapy, Neuromuscular Re-ed, Patient Education, Self Care, Therapeutic Exercise, and Therapeutic Activites     Kaylin [place]TANA  Dany, PT, DPT

## 2023-08-28 ENCOUNTER — OFFICE VISIT (OUTPATIENT)
Dept: OBSTETRICS AND GYNECOLOGY | Facility: CLINIC | Age: 33
End: 2023-08-28
Payer: COMMERCIAL

## 2023-08-28 ENCOUNTER — LAB VISIT (OUTPATIENT)
Dept: LAB | Facility: HOSPITAL | Age: 33
End: 2023-08-28
Attending: NURSE PRACTITIONER
Payer: COMMERCIAL

## 2023-08-28 VITALS
WEIGHT: 216.38 LBS | SYSTOLIC BLOOD PRESSURE: 122 MMHG | HEIGHT: 63 IN | BODY MASS INDEX: 38.34 KG/M2 | DIASTOLIC BLOOD PRESSURE: 74 MMHG

## 2023-08-28 DIAGNOSIS — R63.5 RECENT WEIGHT GAIN: ICD-10-CM

## 2023-08-28 DIAGNOSIS — N94.6 DYSMENORRHEA: ICD-10-CM

## 2023-08-28 DIAGNOSIS — Z01.419 ENCOUNTER FOR ANNUAL ROUTINE GYNECOLOGICAL EXAMINATION: Primary | ICD-10-CM

## 2023-08-28 PROCEDURE — 1160F RVW MEDS BY RX/DR IN RCRD: CPT | Mod: CPTII,S$GLB,,

## 2023-08-28 PROCEDURE — 3074F SYST BP LT 130 MM HG: CPT | Mod: CPTII,S$GLB,,

## 2023-08-28 PROCEDURE — 82670 ASSAY OF TOTAL ESTRADIOL: CPT

## 2023-08-28 PROCEDURE — 83002 ASSAY OF GONADOTROPIN (LH): CPT

## 2023-08-28 PROCEDURE — 3074F PR MOST RECENT SYSTOLIC BLOOD PRESSURE < 130 MM HG: ICD-10-PCS | Mod: CPTII,S$GLB,,

## 2023-08-28 PROCEDURE — 1159F PR MEDICATION LIST DOCUMENTED IN MEDICAL RECORD: ICD-10-PCS | Mod: CPTII,S$GLB,,

## 2023-08-28 PROCEDURE — 84144 ASSAY OF PROGESTERONE: CPT

## 2023-08-28 PROCEDURE — 3078F PR MOST RECENT DIASTOLIC BLOOD PRESSURE < 80 MM HG: ICD-10-PCS | Mod: CPTII,S$GLB,,

## 2023-08-28 PROCEDURE — 36415 COLL VENOUS BLD VENIPUNCTURE: CPT | Mod: PN

## 2023-08-28 PROCEDURE — 1159F MED LIST DOCD IN RCRD: CPT | Mod: CPTII,S$GLB,,

## 2023-08-28 PROCEDURE — 99999 PR PBB SHADOW E&M-EST. PATIENT-LVL IV: CPT | Mod: PBBFAC,,,

## 2023-08-28 PROCEDURE — 99395 PR PREVENTIVE VISIT,EST,18-39: ICD-10-PCS | Mod: S$GLB,,,

## 2023-08-28 PROCEDURE — 83001 ASSAY OF GONADOTROPIN (FSH): CPT

## 2023-08-28 PROCEDURE — 84146 ASSAY OF PROLACTIN: CPT

## 2023-08-28 PROCEDURE — 99395 PREV VISIT EST AGE 18-39: CPT | Mod: S$GLB,,,

## 2023-08-28 PROCEDURE — 1160F PR REVIEW ALL MEDS BY PRESCRIBER/CLIN PHARMACIST DOCUMENTED: ICD-10-PCS | Mod: CPTII,S$GLB,,

## 2023-08-28 PROCEDURE — 3008F BODY MASS INDEX DOCD: CPT | Mod: CPTII,S$GLB,,

## 2023-08-28 PROCEDURE — 3008F PR BODY MASS INDEX (BMI) DOCUMENTED: ICD-10-PCS | Mod: CPTII,S$GLB,,

## 2023-08-28 PROCEDURE — 3044F HG A1C LEVEL LT 7.0%: CPT | Mod: CPTII,S$GLB,,

## 2023-08-28 PROCEDURE — 3078F DIAST BP <80 MM HG: CPT | Mod: CPTII,S$GLB,,

## 2023-08-28 PROCEDURE — 84403 ASSAY OF TOTAL TESTOSTERONE: CPT

## 2023-08-28 PROCEDURE — 82627 DEHYDROEPIANDROSTERONE: CPT

## 2023-08-28 PROCEDURE — 82306 VITAMIN D 25 HYDROXY: CPT

## 2023-08-28 PROCEDURE — 3044F PR MOST RECENT HEMOGLOBIN A1C LEVEL <7.0%: ICD-10-PCS | Mod: CPTII,S$GLB,,

## 2023-08-28 PROCEDURE — 99999 PR PBB SHADOW E&M-EST. PATIENT-LVL IV: ICD-10-PCS | Mod: PBBFAC,,,

## 2023-08-28 NOTE — PROGRESS NOTES
Subjective:       Patient ID: Hesham Ordonez is a 33 y.o. female.    Chief Complaint:  Annual Exam      History of Present Illness  HPI  Annual Exam-Premenopausal  Patient presents for annual exam. The patient has complaints today of weight gain, 15lbs in 3 months. The patient is sexually active, , BTL. GYN screening history: last pap: was normal, 22 The patient wears seatbelts: yes. The patient participates in regular exercise: yes, walk, low-impact home workouts, reports she eats in moderation but diet could be better. Has the patient ever been transfused or tattooed?: yes, tattoos. The patient reports that there is not domestic violence in her life. Menses are regular with periods lasting 5-6 days. Denies excessive bleeding. Severe cramping and bloating before and during menses.   Very upset with recent weight gain, reports she feels she is very active with her sons and activities, she has however switched roles at her job and is now more sedentary in the office.     GYN & OB History  Patient's last menstrual period was 2023 (exact date).   Date of Last Pap: 2/10/2021    OB History    Para Term  AB Living   3 3 3 0 0 3   SAB IAB Ectopic Multiple Live Births   0 0 0 0 3      # Outcome Date GA Lbr Virgilio/2nd Weight Sex Delivery Anes PTL Lv   3 Term 18 39w3d  4.52 kg (9 lb 15.4 oz) M CS-LTranv Spinal N DANIEL   2 Term 16 41w1d  3.94 kg (8 lb 11 oz) M CS-LTranv EPI N DANIEL   1 Term 11/23/10 40w0d  3.771 kg (8 lb 5 oz) M CS-Unspec   DANIEL      Complications: Preeclampsia       Review of Systems  Review of Systems   Constitutional:  Positive for unexpected weight change.   HENT: Negative.     Eyes: Negative.    Respiratory: Negative.     Cardiovascular: Negative.    Gastrointestinal:  Positive for bloating.   Endocrine: Negative.    Genitourinary:  Positive for dysmenorrhea.   Musculoskeletal:  Positive for back pain.   Integumentary:  Positive for hair changes. Negative.    Neurological: Negative.    Hematological: Negative.    Psychiatric/Behavioral: Negative.     All other systems reviewed and are negative.  Breast: negative.  Positive for breast self exam.          Objective:      Physical Exam:   Constitutional: She is oriented to person, place, and time. She appears well-developed and well-nourished.    HENT:   Head: Normocephalic and atraumatic.   Nose: Nose normal.    Eyes: Pupils are equal, round, and reactive to light. Conjunctivae and EOM are normal.     Cardiovascular:  Normal rate and regular rhythm.             Pulmonary/Chest: Effort normal. She has no decreased breath sounds. She has no rhonchi. Right breast exhibits no inverted nipple, no mass, no nipple discharge, no tenderness and no bleeding. Left breast exhibits no inverted nipple, no mass, no nipple discharge, no tenderness and no bleeding. Breasts are symmetrical.        Abdominal: Soft. There is no abdominal tenderness.     Genitourinary:    Inguinal canal, vagina, uterus, right adnexa, left adnexa and rectum normal.      Pelvic exam was performed with patient supine.   The external female genitalia was normal.   No external genitalia lesions identified,Genitalia hair distrobution normal .   Labial bartholins normal.Cervix is normal. Right adnexum displays no mass and no tenderness. Left adnexum displays no mass and no tenderness. No  no vaginal discharge, tenderness or bleeding in the vagina. Vagina was moist.Cervix exhibits no motion tenderness, no discharge and no tenderness. Uerus contour normal  Uterus is not tender. Uterus size: 10 cm.Normal urethral meatus.          Musculoskeletal: Normal range of motion and moves all extremeties.       Neurological: She is alert and oriented to person, place, and time.    Skin: Skin is warm and dry.    Psychiatric: She has a normal mood and affect. Her speech is normal and behavior is normal. Mood, judgment and thought content normal.             Assessment:        1.  Encounter for annual routine gynecological examination    2. Recent weight gain    3. Dysmenorrhea               Plan:   Continue annual well woman exam.  Pap current, due 2024. Patient was counseled today on ASCCP screening guidelines (pap smear every 3 years in low risk patients) and recommendations for annual pelvic exams and clinical breast exams, yearly mammograms starting at age 40; and to see her PCP for other healthcare maintenance.   Dysmenorrhea and prostaglandins discussed with pt.  Start ibuprofen with food a day or two prior to menses as scheduled for 2-3d to help prevent dysmenorrhea.  Discussed the use of hormonal birth control to help with Dysmenorrhea, patient declines for now and wishes to only proceed with lab work.    Encouraged diet, exercise, and weight loss  Download my fitness pall or any other calorie counting andreia.       Diagnosis and orders this visit:  Encounter for annual routine gynecological examination  -     Ambulatory referral/consult to Gynecology    Recent weight gain  -     Vitamin D; Future  -     Luteinizing hormone; Future; Expected date: 08/28/2023  -     Follicle stimulating hormone; Future; Expected date: 08/28/2023  -     Estradiol; Future; Expected date: 08/28/2023  -     Prolactin; Future; Expected date: 08/28/2023  -     Testosterone; Future; Expected date: 08/28/2023  -     DHEA-sulfate; Future; Expected date: 08/28/2023  -     Progesterone; Future; Expected date: 08/28/2023    Dysmenorrhea      Kristi Nunez NP

## 2023-08-29 DIAGNOSIS — E55.9 VITAMIN D INSUFFICIENCY: Primary | ICD-10-CM

## 2023-08-29 LAB
25(OH)D3+25(OH)D2 SERPL-MCNC: 20 NG/ML (ref 30–96)
DHEA-S SERPL-MCNC: 153.8 UG/DL (ref 95.8–511.7)
ESTRADIOL SERPL-MCNC: 84 PG/ML
FSH SERPL-ACNC: 4.62 MIU/ML
LH SERPL-ACNC: 3.8 MIU/ML
PROGEST SERPL-MCNC: 7.1 NG/ML
PROLACTIN SERPL IA-MCNC: 6.1 NG/ML (ref 5.2–26.5)
TESTOST SERPL-MCNC: 36 NG/DL (ref 5–73)

## 2023-08-29 RX ORDER — ERGOCALCIFEROL 1.25 MG/1
50000 CAPSULE ORAL WEEKLY
Qty: 8 CAPSULE | Refills: 0 | Status: SHIPPED | OUTPATIENT
Start: 2023-08-29 | End: 2023-10-18
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
## 2023-09-18 ENCOUNTER — OFFICE VISIT (OUTPATIENT)
Dept: PSYCHIATRY | Facility: CLINIC | Age: 33
End: 2023-09-18
Payer: COMMERCIAL

## 2023-09-18 DIAGNOSIS — F41.1 GENERALIZED ANXIETY DISORDER WITH PANIC ATTACKS: ICD-10-CM

## 2023-09-18 DIAGNOSIS — F31.81 BIPOLAR 2 DISORDER: Primary | ICD-10-CM

## 2023-09-18 DIAGNOSIS — F41.0 GENERALIZED ANXIETY DISORDER WITH PANIC ATTACKS: ICD-10-CM

## 2023-09-18 PROCEDURE — 99999 PR PBB SHADOW E&M-EST. PATIENT-LVL I: ICD-10-PCS | Mod: PBBFAC,,, | Performed by: PSYCHIATRY & NEUROLOGY

## 2023-09-18 PROCEDURE — 99215 OFFICE O/P EST HI 40 MIN: CPT | Mod: S$GLB,,, | Performed by: PSYCHIATRY & NEUROLOGY

## 2023-09-18 PROCEDURE — 1159F PR MEDICATION LIST DOCUMENTED IN MEDICAL RECORD: ICD-10-PCS | Mod: CPTII,S$GLB,, | Performed by: PSYCHIATRY & NEUROLOGY

## 2023-09-18 PROCEDURE — 1159F MED LIST DOCD IN RCRD: CPT | Mod: CPTII,S$GLB,, | Performed by: PSYCHIATRY & NEUROLOGY

## 2023-09-18 PROCEDURE — 3044F HG A1C LEVEL LT 7.0%: CPT | Mod: CPTII,S$GLB,, | Performed by: PSYCHIATRY & NEUROLOGY

## 2023-09-18 PROCEDURE — 1160F PR REVIEW ALL MEDS BY PRESCRIBER/CLIN PHARMACIST DOCUMENTED: ICD-10-PCS | Mod: CPTII,S$GLB,, | Performed by: PSYCHIATRY & NEUROLOGY

## 2023-09-18 PROCEDURE — 99211 OFF/OP EST MAY X REQ PHY/QHP: CPT | Mod: PBBFAC | Performed by: PSYCHIATRY & NEUROLOGY

## 2023-09-18 PROCEDURE — 3044F PR MOST RECENT HEMOGLOBIN A1C LEVEL <7.0%: ICD-10-PCS | Mod: CPTII,S$GLB,, | Performed by: PSYCHIATRY & NEUROLOGY

## 2023-09-18 PROCEDURE — 1160F RVW MEDS BY RX/DR IN RCRD: CPT | Mod: CPTII,S$GLB,, | Performed by: PSYCHIATRY & NEUROLOGY

## 2023-09-18 PROCEDURE — 99215 PR OFFICE/OUTPT VISIT, EST, LEVL V, 40-54 MIN: ICD-10-PCS | Mod: S$GLB,,, | Performed by: PSYCHIATRY & NEUROLOGY

## 2023-09-18 PROCEDURE — 99999 PR PBB SHADOW E&M-EST. PATIENT-LVL I: CPT | Mod: PBBFAC,,, | Performed by: PSYCHIATRY & NEUROLOGY

## 2023-09-18 RX ORDER — ALPRAZOLAM 0.25 MG/1
0.25 TABLET ORAL 2 TIMES DAILY PRN
Qty: 60 TABLET | Refills: 1 | Status: SHIPPED | OUTPATIENT
Start: 2023-09-18 | End: 2023-12-18 | Stop reason: SDUPTHER

## 2023-09-18 RX ORDER — ARIPIPRAZOLE 5 MG/1
5 TABLET ORAL DAILY
Qty: 30 TABLET | Refills: 1 | Status: SHIPPED | OUTPATIENT
Start: 2023-09-18 | End: 2023-11-20

## 2023-10-13 ENCOUNTER — PATIENT MESSAGE (OUTPATIENT)
Dept: PSYCHIATRY | Facility: CLINIC | Age: 33
End: 2023-10-13
Payer: COMMERCIAL

## 2023-10-13 DIAGNOSIS — F31.81 BIPOLAR 2 DISORDER: Primary | ICD-10-CM

## 2023-10-13 RX ORDER — QUETIAPINE FUMARATE 25 MG/1
25 TABLET, FILM COATED ORAL NIGHTLY
Qty: 30 TABLET | Refills: 0 | Status: SHIPPED | OUTPATIENT
Start: 2023-10-13 | End: 2023-10-24

## 2023-10-13 NOTE — TELEPHONE ENCOUNTER
"I called the patient regarding her message.  She reports insomnia over the last few weeks with difficulty falling asleep and a problem of awakening early.  This is coincident with Abilify; however, the patient also reports that her stress level increased about the same time, and she feels that problem is more so related to stress and anxiety.  She continues to be very busy at home and says that there have been new work stressors.    The patient reports decreased energy, fatigue from sleeping poorly.  She says that her mind is racing with regards to stressors and things that she is worried about.  She reports mild irritability on and off related to fatigue.  No elevation of mood.  No increased activity or change in speech, no distractibility apart from poor concentration when she is anxious, no grandiosity or risks.  Functioning remains intact.  She reports that depression has resolved with Abilify, such that she is hesitant to change the medication and feels that assistance with sleep and possibly anxiety will be sufficient.  No psychosis, feelings of aggression, or thoughts of harm to self or others.    The patient is pleasant, appreciative, and fully appropriate on the phone with regards to speech, thought processes, and overall interaction.    No side effects of Xanax or Abilify, including no akathisia, EPS, or dyskinesias.  She reports that she had hives for part of 1 day but says, "that is normal for me when I feel stressed, that has been going on for many years, it has been no different," and hives currently resolved.    The patient was with her  at the time of the phone call, and she asked him for his feedback about her mood.  He indicated to her that it had been good, without depression, shelly, or hypomania by her questions.    The patient is agreeable to Seroquel 25 mg at bedtime she is off this weekend and can try the medication in a situation of no obligations away from home the next day.  " Rationale with regards to mood and sleep, risks, and side effects were reviewed with her as similar to Abilify, though with different percentages of certain side effects, including more risk of sedation, unsteadiness, decreased coordination, decreased focus, and effects on driving and other activities.

## 2023-10-23 ENCOUNTER — PATIENT MESSAGE (OUTPATIENT)
Dept: OBSTETRICS AND GYNECOLOGY | Facility: CLINIC | Age: 33
End: 2023-10-23
Payer: COMMERCIAL

## 2023-10-24 ENCOUNTER — TELEPHONE (OUTPATIENT)
Dept: PSYCHIATRY | Facility: CLINIC | Age: 33
End: 2023-10-24
Payer: COMMERCIAL

## 2023-10-24 DIAGNOSIS — F31.81 BIPOLAR 2 DISORDER: ICD-10-CM

## 2023-10-24 RX ORDER — QUETIAPINE FUMARATE 25 MG/1
50 TABLET, FILM COATED ORAL NIGHTLY
Qty: 30 TABLET | Refills: 0
Start: 2023-10-24 | End: 2023-11-13

## 2023-10-24 NOTE — TELEPHONE ENCOUNTER
I could not reach the patient yesterday, calling her about the message she sent about insomnia and what sounds like depression, but I was able to reach her today.  The patient does indeed describe symptoms of depression, though she feels that Abilify has been helpful to some degree.  No suicidal ideations or thoughts of self-harm whatsoever, and she is consistently confident of her safety.  She reports awakening between 2 and 3:00 a.m. and being unable to fall back asleep, with fatigue next day.  No side effects of medications, including with specific questioning, and she reports adherence.  She denies any stressors.  No elevated moods, irritable moods, manic signs or symptoms, psychosis, feelings of aggression, or thoughts of harm to self or others.  She is pleasant and appropriately interactive on the phone.    The patient will increase Seroquel to 50 mg at bedtime by taking 2 tablets of 25 mg, to address sleep and mood issues.  She confirms her appointment for next week and volunteers that she will message for any questions or problems prior to then.  I explained that messages are checked during clinic hours, such that if any urgent situations arise, use 911 and/or the ED; she expressed understanding and agreement.

## 2023-11-11 DIAGNOSIS — F31.81 BIPOLAR 2 DISORDER: ICD-10-CM

## 2023-11-13 RX ORDER — QUETIAPINE FUMARATE 25 MG/1
50 TABLET, FILM COATED ORAL NIGHTLY
Qty: 60 TABLET | Refills: 0 | Status: SHIPPED | OUTPATIENT
Start: 2023-11-13 | End: 2023-12-18 | Stop reason: SDUPTHER

## 2023-11-18 DIAGNOSIS — F31.81 BIPOLAR 2 DISORDER: ICD-10-CM

## 2023-11-20 RX ORDER — ARIPIPRAZOLE 5 MG/1
5 TABLET ORAL
Qty: 30 TABLET | Refills: 0 | Status: SHIPPED | OUTPATIENT
Start: 2023-11-20 | End: 2023-12-18 | Stop reason: SDUPTHER

## 2023-12-13 ENCOUNTER — PATIENT MESSAGE (OUTPATIENT)
Dept: PSYCHIATRY | Facility: CLINIC | Age: 33
End: 2023-12-13
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
## 2023-12-18 ENCOUNTER — OFFICE VISIT (OUTPATIENT)
Dept: PSYCHIATRY | Facility: CLINIC | Age: 33
End: 2023-12-18
Payer: COMMERCIAL

## 2023-12-18 DIAGNOSIS — F41.1 GENERALIZED ANXIETY DISORDER WITH PANIC ATTACKS: ICD-10-CM

## 2023-12-18 DIAGNOSIS — F31.81 BIPOLAR 2 DISORDER: Primary | ICD-10-CM

## 2023-12-18 DIAGNOSIS — F41.0 GENERALIZED ANXIETY DISORDER WITH PANIC ATTACKS: ICD-10-CM

## 2023-12-18 PROCEDURE — 1159F PR MEDICATION LIST DOCUMENTED IN MEDICAL RECORD: ICD-10-PCS | Mod: CPTII,95,, | Performed by: PSYCHIATRY & NEUROLOGY

## 2023-12-18 PROCEDURE — 3044F PR MOST RECENT HEMOGLOBIN A1C LEVEL <7.0%: ICD-10-PCS | Mod: CPTII,95,, | Performed by: PSYCHIATRY & NEUROLOGY

## 2023-12-18 PROCEDURE — 1160F RVW MEDS BY RX/DR IN RCRD: CPT | Mod: CPTII,95,, | Performed by: PSYCHIATRY & NEUROLOGY

## 2023-12-18 PROCEDURE — 99215 PR OFFICE/OUTPT VISIT, EST, LEVL V, 40-54 MIN: ICD-10-PCS | Mod: 95,,, | Performed by: PSYCHIATRY & NEUROLOGY

## 2023-12-18 PROCEDURE — 1160F PR REVIEW ALL MEDS BY PRESCRIBER/CLIN PHARMACIST DOCUMENTED: ICD-10-PCS | Mod: CPTII,95,, | Performed by: PSYCHIATRY & NEUROLOGY

## 2023-12-18 PROCEDURE — 1159F MED LIST DOCD IN RCRD: CPT | Mod: CPTII,95,, | Performed by: PSYCHIATRY & NEUROLOGY

## 2023-12-18 PROCEDURE — 99215 OFFICE O/P EST HI 40 MIN: CPT | Mod: 95,,, | Performed by: PSYCHIATRY & NEUROLOGY

## 2023-12-18 PROCEDURE — 3044F HG A1C LEVEL LT 7.0%: CPT | Mod: CPTII,95,, | Performed by: PSYCHIATRY & NEUROLOGY

## 2023-12-18 RX ORDER — QUETIAPINE FUMARATE 25 MG/1
25-50 TABLET, FILM COATED ORAL NIGHTLY
Qty: 60 TABLET | Refills: 0 | Status: SHIPPED | OUTPATIENT
Start: 2023-12-18 | End: 2024-03-11 | Stop reason: SDUPTHER

## 2023-12-18 RX ORDER — ARIPIPRAZOLE 5 MG/1
5 TABLET ORAL DAILY
Qty: 30 TABLET | Refills: 1 | Status: SHIPPED | OUTPATIENT
Start: 2023-12-18 | End: 2024-02-13

## 2023-12-18 RX ORDER — ALPRAZOLAM 0.25 MG/1
0.25 TABLET ORAL 2 TIMES DAILY PRN
Qty: 60 TABLET | Refills: 1 | Status: SHIPPED | OUTPATIENT
Start: 2023-12-18

## 2023-12-18 RX ORDER — LAMOTRIGINE 25 MG/1
25 TABLET ORAL DAILY
Qty: 30 TABLET | Refills: 1 | Status: SHIPPED | OUTPATIENT
Start: 2023-12-18 | End: 2024-03-11

## 2023-12-19 ENCOUNTER — PATIENT MESSAGE (OUTPATIENT)
Dept: DERMATOLOGY | Facility: CLINIC | Age: 33
End: 2023-12-19
Payer: COMMERCIAL

## 2024-01-17 ENCOUNTER — OFFICE VISIT (OUTPATIENT)
Dept: DERMATOLOGY | Facility: CLINIC | Age: 34
End: 2024-01-17
Payer: COMMERCIAL

## 2024-01-17 DIAGNOSIS — L21.9 SEBORRHEIC DERMATITIS: ICD-10-CM

## 2024-01-17 DIAGNOSIS — L81.9 DYSCHROMIA: ICD-10-CM

## 2024-01-17 PROCEDURE — 1159F MED LIST DOCD IN RCRD: CPT | Mod: CPTII,95,, | Performed by: PHYSICIAN ASSISTANT

## 2024-01-17 PROCEDURE — 1160F RVW MEDS BY RX/DR IN RCRD: CPT | Mod: CPTII,95,, | Performed by: PHYSICIAN ASSISTANT

## 2024-01-17 PROCEDURE — 99214 OFFICE O/P EST MOD 30 MIN: CPT | Mod: 95,,, | Performed by: PHYSICIAN ASSISTANT

## 2024-01-17 RX ORDER — KETOCONAZOLE 20 MG/ML
SHAMPOO, SUSPENSION TOPICAL
Qty: 120 ML | Refills: 5 | Status: SHIPPED | OUTPATIENT
Start: 2024-01-17

## 2024-01-17 RX ORDER — FLUOCINOLONE ACETONIDE 0.1 MG/ML
SOLUTION TOPICAL
Qty: 60 ML | Refills: 3 | Status: SHIPPED | OUTPATIENT
Start: 2024-01-17

## 2024-01-17 RX ORDER — HYDROQUINONE 40 MG/G
CREAM TOPICAL
Qty: 28 G | Refills: 1 | Status: SHIPPED | OUTPATIENT
Start: 2024-01-17

## 2024-01-17 NOTE — PROGRESS NOTES
"  Subjective:      Patient ID:  Hesham Ordonez is a 33 y.o. female who presents for No chief complaint on file.    The patient location is: [place], [place]  The chief complaint leading to consultation is: dandruff    Visit type: audiovisual    Face to Face time with patient: 12 minutes  18 minutes of total time spent on the encounter, which includes face to face time and non-face to face time preparing to see the patient (eg, review of tests), Obtaining and/or reviewing separately obtained history, Documenting clinical information in the electronic or other health record, Independently interpreting results (not separately reported) and communicating results to the patient/family/caregiver, or Care coordination (not separately reported).         Each patient to whom he or she provides medical services by telemedicine is:  (1) informed of the relationship between the physician and patient and the respective role of any other health care provider with respect to management of the patient; and (2) notified that he or she may decline to receive medical services by telemedicine and may withdraw from such care at any time.    Notes:     Hx of acne and seb derm of scalp, last seen by Dr. Pabon 10/19/21. Here for f/u today requesting med refills. C/o dandruff of scalp. +dryness, flaking, itching. Intermittent flares "every once in a while."    Previous tx: keto shampoo, derma smoothe fs oil, Head and Shoulders    C/o darkness of neck. States labs have not shown any DM. Has worked towards weight loss.     Current tx: warm water w/ACV.        Review of Systems   Constitutional:  Negative for fever and chills.   Gastrointestinal:  Negative for nausea and vomiting.   Skin:  Positive for itching, dry skin, daily sunscreen use and activity-related sunscreen use. Negative for rash, sun sensitivity, recent sunburn, dry lips and abscesses.   Hematologic/Lymphatic: Does not bruise/bleed easily.       Objective:   Physical Exam "   Constitutional: She appears well-developed and well-nourished. No distress.   Neurological: She is alert and oriented to person, place, and time. She is not disoriented.   Psychiatric: She has a normal mood and affect.   Skin:   Areas Examined (abnormalities noted in diagram):   Scalp / Hair Palpated and Inspected  Head / Face Inspection Performed  Neck Inspection Performed  Chest / Axilla Inspection Performed            Diagram Legend     Erythematous scaling macule/papule c/w actinic keratosis       Vascular papule c/w angioma      Pigmented verrucoid papule/plaque c/w seborrheic keratosis      Yellow umbilicated papule c/w sebaceous hyperplasia      Irregularly shaped tan macule c/w lentigo     1-2 mm smooth white papules consistent with Milia      Movable subcutaneous cyst with punctum c/w epidermal inclusion cyst      Subcutaneous movable cyst c/w pilar cyst      Firm pink to brown papule c/w dermatofibroma      Pedunculated fleshy papule(s) c/w skin tag(s)      Evenly pigmented macule c/w junctional nevus     Mildly variegated pigmented, slightly irregular-bordered macule c/w mildly atypical nevus      Flesh colored to evenly pigmented papule c/w intradermal nevus       Pink pearly papule/plaque c/w basal cell carcinoma      Erythematous hyperkeratotic cursted plaque c/w SCC      Surgical scar with no sign of skin cancer recurrence      Open and closed comedones      Inflammatory papules and pustules      Verrucoid papule consistent consistent with wart     Erythematous eczematous patches and plaques     Dystrophic onycholytic nail with subungual debris c/w onychomycosis     Umbilicated papule    Erythematous-base heme-crusted tan verrucoid plaque consistent with inflamed seborrheic keratosis     Erythematous Silvery Scaling Plaque c/w Psoriasis     See annotation      Assessment / Plan:        Dyschromia  -     hydroquinone 4 % Crea; Apply to dark spots once daily. Use with sunscreen if outdoors  Dispense:  28 g; Refill: 1  Ddx: acanthosis nigricans  Has negative w/u thus far for PCOS and DM. Encouraged continued regular health screenings and monitoring of A1C / glucose. Encouraged daily spf w/spf 30 +. Agree to above rx refill. Trial of HQ 6% cream qd prn.  Patient understands not to use HQ for longer than 3 months due to risk of worsening discoloration (blue-grey discoloration). Must wear daily sunscreen with minimum SPF 30 and broad spectrum coverage.    Seborrheic dermatitis  -     ketoconazole (NIZORAL) 2 % shampoo; Wash hair with medicated shampoo at least 1x/week - let sit on scalp at least 5 minutes prior to rinsing  Dispense: 120 mL; Refill: 5  -     fluocinolone (SYNALAR) 0.01 % external solution; AAA of scalp twice a day prn scalp flares.  Dispense: 60 mL; Refill: 3  Mild, agree to above rx refills. Discussed meds for maintenance vs. Flares.            Follow up in about 4 months (around 5/17/2024).

## 2024-01-23 ENCOUNTER — PATIENT MESSAGE (OUTPATIENT)
Dept: PSYCHIATRY | Facility: CLINIC | Age: 34
End: 2024-01-23
Payer: COMMERCIAL

## 2024-01-25 ENCOUNTER — TELEPHONE (OUTPATIENT)
Dept: PSYCHIATRY | Facility: CLINIC | Age: 34
End: 2024-01-25
Payer: COMMERCIAL

## 2024-02-09 DIAGNOSIS — F31.81 BIPOLAR 2 DISORDER: ICD-10-CM

## 2024-02-13 RX ORDER — ARIPIPRAZOLE 5 MG/1
5 TABLET ORAL
Qty: 30 TABLET | Refills: 1 | Status: SHIPPED | OUTPATIENT
Start: 2024-02-13

## 2024-03-07 ENCOUNTER — TELEPHONE (OUTPATIENT)
Dept: PSYCHIATRY | Facility: CLINIC | Age: 34
End: 2024-03-07
Payer: COMMERCIAL
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
## 2024-03-11 ENCOUNTER — OFFICE VISIT (OUTPATIENT)
Dept: PSYCHIATRY | Facility: CLINIC | Age: 34
End: 2024-03-11
Payer: COMMERCIAL

## 2024-03-11 VITALS — HEART RATE: 82 BPM | SYSTOLIC BLOOD PRESSURE: 144 MMHG | DIASTOLIC BLOOD PRESSURE: 92 MMHG

## 2024-03-11 DIAGNOSIS — F50.81 BINGE EATING DISORDER: ICD-10-CM

## 2024-03-11 DIAGNOSIS — R41.840 ATTENTION AND CONCENTRATION DEFICIT: ICD-10-CM

## 2024-03-11 DIAGNOSIS — F41.1 GENERALIZED ANXIETY DISORDER WITH PANIC ATTACKS: ICD-10-CM

## 2024-03-11 DIAGNOSIS — F31.81 BIPOLAR 2 DISORDER: Primary | ICD-10-CM

## 2024-03-11 DIAGNOSIS — F41.0 GENERALIZED ANXIETY DISORDER WITH PANIC ATTACKS: ICD-10-CM

## 2024-03-11 PROCEDURE — 99215 OFFICE O/P EST HI 40 MIN: CPT | Mod: S$GLB,,, | Performed by: PSYCHIATRY & NEUROLOGY

## 2024-03-11 PROCEDURE — 99999 PR PBB SHADOW E&M-EST. PATIENT-LVL II: CPT | Mod: PBBFAC,,, | Performed by: PSYCHIATRY & NEUROLOGY

## 2024-03-11 PROCEDURE — 3077F SYST BP >= 140 MM HG: CPT | Mod: CPTII,,, | Performed by: PSYCHIATRY & NEUROLOGY

## 2024-03-11 PROCEDURE — 1160F RVW MEDS BY RX/DR IN RCRD: CPT | Mod: CPTII,,, | Performed by: PSYCHIATRY & NEUROLOGY

## 2024-03-11 PROCEDURE — 3080F DIAST BP >= 90 MM HG: CPT | Mod: CPTII,,, | Performed by: PSYCHIATRY & NEUROLOGY

## 2024-03-11 PROCEDURE — 99212 OFFICE O/P EST SF 10 MIN: CPT | Mod: PBBFAC | Performed by: PSYCHIATRY & NEUROLOGY

## 2024-03-11 PROCEDURE — 1159F MED LIST DOCD IN RCRD: CPT | Mod: CPTII,,, | Performed by: PSYCHIATRY & NEUROLOGY

## 2024-03-11 RX ORDER — QUETIAPINE FUMARATE 50 MG/1
50 TABLET, FILM COATED ORAL NIGHTLY
Qty: 30 TABLET | Refills: 1 | Status: SHIPPED | OUTPATIENT
Start: 2024-03-11

## 2024-04-09 ENCOUNTER — TELEPHONE (OUTPATIENT)
Dept: PSYCHIATRY | Facility: CLINIC | Age: 34
End: 2024-04-09
Payer: COMMERCIAL

## 2024-04-12 ENCOUNTER — OFFICE VISIT (OUTPATIENT)
Dept: OBSTETRICS AND GYNECOLOGY | Facility: CLINIC | Age: 34
End: 2024-04-12
Payer: COMMERCIAL

## 2024-04-12 VITALS
HEIGHT: 63 IN | BODY MASS INDEX: 37.81 KG/M2 | WEIGHT: 213.38 LBS | SYSTOLIC BLOOD PRESSURE: 140 MMHG | DIASTOLIC BLOOD PRESSURE: 90 MMHG

## 2024-04-12 DIAGNOSIS — Z12.4 CERVICAL CANCER SCREENING: ICD-10-CM

## 2024-04-12 DIAGNOSIS — R10.2 PELVIC PAIN: Primary | ICD-10-CM

## 2024-04-12 PROCEDURE — 1159F MED LIST DOCD IN RCRD: CPT | Mod: CPTII,S$GLB,,

## 2024-04-12 PROCEDURE — 88175 CYTOPATH C/V AUTO FLUID REDO: CPT

## 2024-04-12 PROCEDURE — 3077F SYST BP >= 140 MM HG: CPT | Mod: CPTII,S$GLB,,

## 2024-04-12 PROCEDURE — 99999 PR PBB SHADOW E&M-EST. PATIENT-LVL III: CPT | Mod: PBBFAC,,,

## 2024-04-12 PROCEDURE — 87624 HPV HI-RISK TYP POOLED RSLT: CPT

## 2024-04-12 PROCEDURE — 3008F BODY MASS INDEX DOCD: CPT | Mod: CPTII,S$GLB,,

## 2024-04-12 PROCEDURE — 3080F DIAST BP >= 90 MM HG: CPT | Mod: CPTII,S$GLB,,

## 2024-04-12 PROCEDURE — 1160F RVW MEDS BY RX/DR IN RCRD: CPT | Mod: CPTII,S$GLB,,

## 2024-04-12 PROCEDURE — 99213 OFFICE O/P EST LOW 20 MIN: CPT | Mod: S$GLB,,,

## 2024-04-12 PROCEDURE — 87491 CHLMYD TRACH DNA AMP PROBE: CPT

## 2024-04-12 PROCEDURE — 87086 URINE CULTURE/COLONY COUNT: CPT

## 2024-04-12 NOTE — PROGRESS NOTES
Subjective:       Patient ID: Hesham Ordonez is a 34 y.o. female.    Chief Complaint:  Pelvic Pain      History of Present Illness  Pelvic Pain  The patient's primary symptoms include pelvic pain.       Patient presents with complaints of pelvic pain,this has been an ongoing problem and has worsen since since the start of this year.    Menses are regular with periods lasting 5-6 days. Denies excessive bleeding. Severe cramping and bloating before and during menses.   She has had a TL, denies history of fibroids or ovarian cyst   She is due for pap smear    GYN & OB History  Patient's last menstrual period was 2024.   Date of Last Pap: 2/10/2021    OB History    Para Term  AB Living   3 3 3 0 0 3   SAB IAB Ectopic Multiple Live Births   0 0 0 0 3      # Outcome Date GA Lbr Virgilio/2nd Weight Sex Type Anes PTL Lv   3 Term 18 39w3d  4.52 kg (9 lb 15.4 oz) M CS-LTranv Spinal N DANIEL   2 Term 16 41w1d  3.94 kg (8 lb 11 oz) M CS-LTranv EPI N DANIEL   1 Term 11/23/10 40w0d  3.771 kg (8 lb 5 oz) M CS-Unspec   DANIEL      Complications: Preeclampsia       Review of Systems  Review of Systems   Genitourinary:  Positive for pelvic pain.           Objective:      Physical Exam:   Constitutional: She is oriented to person, place, and time. She appears well-developed and well-nourished. No distress.    HENT:   Head: Normocephalic and atraumatic.    Eyes: Pupils are equal, round, and reactive to light. Conjunctivae and EOM are normal.     Cardiovascular:  Normal rate.             Pulmonary/Chest: Effort normal.        Abdominal: Soft. She exhibits no distension. There is no abdominal tenderness. There is no rebound and no guarding. Hernia confirmed negative in the right inguinal area and confirmed negative in the left inguinal area.     Genitourinary:    Inguinal canal, vagina, right adnexa and left adnexa normal.   Rectum:      No external hemorrhoid.      Pelvic exam was performed with patient supine.   The  external female genitalia was normal.   No external genitalia lesions identified,Genitalia hair distrobution normal .     Labial bartholins normal.There is no rash, tenderness, lesion or injury on the right labia. There is no rash, tenderness, lesion or injury on the left labia. Cervix is normal. Right adnexum displays no mass, no tenderness and no fullness. Left adnexum displays no mass, no tenderness and no fullness. No erythema, vaginal discharge, tenderness or bleeding in the vagina.    No foreign body in the vagina.      No signs of injury in the vagina.   Vagina was moist.Cervix exhibits no motion tenderness, no lesion, no friability, no tenderness and no polyp.    pap smear completedUerus contour normal  Uterus is tender. Uterus is not enlarged. Normal urethral meatus.Urethral Meatus exhibits: urethral lesionUrethra findings: no urethral mass, no tenderness, no urethral scarring and prolapsedBladder findings: no bladder distention and no bladder tenderness          Musculoskeletal: Normal range of motion and moves all extremeties.      Lymphadenopathy: No inguinal adenopathy noted on the right or left side.    Neurological: She is alert and oriented to person, place, and time.    Skin: Skin is warm and dry. No rash noted. She is not diaphoretic. No erythema. No pallor.    Psychiatric: She has a normal mood and affect. Her behavior is normal. Judgment and thought content normal.             Assessment:        1. Pelvic pain    2. Cervical cancer screening               Plan:   Continue annual well woman exam.  Pelvic US ordered and scheduled  Pap collected. Reviewed updated recommendations for pap smears (every 3 years) in low risk patients.    Diagnosis and orders this visit:  Pelvic pain  -     US Pelvis Comp with Transvag NON-OB (xpd; Future; Expected date: 04/12/2024  -     C. trachomatis/N. gonorrhoeae by AMP DNA  -     Urine culture    Cervical cancer screening  -     Cancel: Liquid-Based Pap Smear,  Screening  -     Cancel: HPV High Risk Genotypes, PCR    Other orders  -     Pap Smear, Thin Prep  -     HPV DNA, High Risk with Reflex to Genotype 16,18         Kristi Nunez, NP

## 2024-04-15 LAB
BACTERIA UR CULT: NORMAL
C TRACH DNA SPEC QL NAA+PROBE: NOT DETECTED
N GONORRHOEA DNA SPEC QL NAA+PROBE: NOT DETECTED

## 2024-04-26 ENCOUNTER — HOSPITAL ENCOUNTER (OUTPATIENT)
Dept: RADIOLOGY | Facility: HOSPITAL | Age: 34
Discharge: HOME | End: 2024-04-26
Payer: COMMERCIAL

## 2024-04-26 DIAGNOSIS — R10.2 PELVIC PAIN: ICD-10-CM

## 2024-04-26 PROCEDURE — 76830 TRANSVAGINAL US NON-OB: CPT | Mod: 26,,, | Performed by: RADIOLOGY

## 2024-04-26 PROCEDURE — 76856 US EXAM PELVIC COMPLETE: CPT | Mod: 26,,, | Performed by: RADIOLOGY

## 2024-04-26 PROCEDURE — 76830 TRANSVAGINAL US NON-OB: CPT | Mod: TC

## 2024-05-16 ENCOUNTER — OFFICE VISIT (OUTPATIENT)
Dept: PODIATRY | Facility: CLINIC | Age: 34
End: 2024-05-16
Payer: COMMERCIAL

## 2024-05-16 VITALS — HEIGHT: 63 IN | WEIGHT: 212.06 LBS | BODY MASS INDEX: 37.57 KG/M2

## 2024-05-16 DIAGNOSIS — L60.2 NAIL DISORDER (ONYCHOGRYPHOSIS): Primary | ICD-10-CM

## 2024-05-16 PROCEDURE — 87101 SKIN FUNGI CULTURE: CPT | Performed by: PODIATRIST

## 2024-05-16 PROCEDURE — 11730 AVULSION NAIL PLATE SIMPLE 1: CPT | Mod: TA,S$GLB,, | Performed by: PODIATRIST

## 2024-05-16 PROCEDURE — 99999 PR PBB SHADOW E&M-EST. PATIENT-LVL III: CPT | Mod: PBBFAC,,, | Performed by: PODIATRIST

## 2024-05-16 PROCEDURE — 1159F MED LIST DOCD IN RCRD: CPT | Mod: CPTII,S$GLB,, | Performed by: PODIATRIST

## 2024-05-16 PROCEDURE — 87107 FUNGI IDENTIFICATION MOLD: CPT | Mod: 59 | Performed by: PODIATRIST

## 2024-05-16 PROCEDURE — 3008F BODY MASS INDEX DOCD: CPT | Mod: CPTII,S$GLB,, | Performed by: PODIATRIST

## 2024-05-16 PROCEDURE — 99214 OFFICE O/P EST MOD 30 MIN: CPT | Mod: 25,S$GLB,, | Performed by: PODIATRIST

## 2024-05-16 PROCEDURE — 3044F HG A1C LEVEL LT 7.0%: CPT | Mod: CPTII,S$GLB,, | Performed by: PODIATRIST

## 2024-05-16 NOTE — PROGRESS NOTES
Podiatry Department    Patient ID: Hesham Ordonez is a 34 y.o. female.    Chief Complaint: Ingrown Toenail (Coming in for  total nail removal of the left foot, pt rates pain 0/10  , pt is non-diabetic)  Dictation #1  MRN:[id number]  CSN:[id number]   History of Present Illness    CHIEF COMPLAINT:  Patient presents with a request for removal of the entire nail of the left great toe due persistent fungal infection.    DERMATOLOGIC CONCERN:  Despite long-term application of antifungal cream, patient reports no significant improvement of the persistent fungal infection affecting her left great toe.    ROS:  Skin: +rash            Physical Exam       Thickened mycotic toenail right hallux          Diagnoses:  1. Nail disorder (onychogryphosis)  -     Fungal culture , skin, hair, or nails        Assessment & Plan    FUNGAL INFECTION OF THE LEFT BIG TOE:  - Will perform a temporary total nail avulsion on the patient's left great toe due to a persistent fungal infection.  - Presented two treatment options to the patient: nail removal followed by antifungal cream application, or root destruction to prevent nail regrowth.  - The patient opted for the first option.  - Ordered a temporary total nail avulsion procedure.  - Explained that post-procedure, the patient must continue to apply antifungal cream to the affected area.  PROCEDURE PREPARATION AND PATIENT EDUCATION:    - Educated the patient about the procedure and potential outcomes.  - Informed the patient about the injection process, including the potential burning sensation.  - Reassured the patient and emphasized the importance of remaining still during the injection to prevent complications.         -Patient agrees to proceed with Total nail avulsion. Risks/benefits/possible complications discussed. Verbal and written consent was signed and witnessed by LPN.    Procedure : Total Nail Avulsion, Right hallux    The right hallux  was anesthesized with 3 cc of 1%  Lidocaine plain. The area was prepped with betadine scrub and then painted with iodine. A sterile tournicot was placed for hemostasis. The affected border of the involved nail bed was freed from the skin edge and nail bed. A total nail avulsion was performed with standard technique. The wound was flushed with normal sterile saline and neosporin ointment was applied. The tournicot was removed and hyperemia was noted to the digit. The patient tolerated the procedure well. The toe was dressed with gauze, fidel and coban.    -Patient received instructions for post op care and soaking. Post op shoe dispensed.  -If any signs of infection--increased redness, purulent drainage, increased pain then patient is advised to return to clinic or ER if after clinic hours. Advised to limit activities.  -RTC in 2 weeks for f/u visit, or sooner if any signs of infection noted.      Future Appointments   Date Time Provider Department Center   5/30/2024  3:15 PM Nelli Rosario DPM [facility]        This note was generated with the assistance of ambient listening technology. Verbal consent was obtained by the patient and accompanying visitor(s) for the recording of patient appointment to facilitate this note. I attest to having reviewed and edited the generated note for accuracy, though some syntax or spelling errors may persist. Please contact the author of this note for any clarification.      Report Electronically Signed By:     Nelli Rosario DPM   Podiatry  [facility]-   5/16/2024

## 2024-05-20 ENCOUNTER — PATIENT MESSAGE (OUTPATIENT)
Dept: OBSTETRICS AND GYNECOLOGY | Facility: CLINIC | Age: 34
End: 2024-05-20
Payer: COMMERCIAL

## 2024-05-21 ENCOUNTER — PATIENT MESSAGE (OUTPATIENT)
Dept: PODIATRY | Facility: CLINIC | Age: 34
End: 2024-05-21
Payer: COMMERCIAL

## 2024-05-24 ENCOUNTER — OFFICE VISIT (OUTPATIENT)
Dept: OBSTETRICS AND GYNECOLOGY | Facility: CLINIC | Age: 34
End: 2024-05-24
Payer: COMMERCIAL

## 2024-05-24 VITALS
BODY MASS INDEX: 38.28 KG/M2 | DIASTOLIC BLOOD PRESSURE: 80 MMHG | WEIGHT: 216.06 LBS | SYSTOLIC BLOOD PRESSURE: 132 MMHG | HEIGHT: 63 IN

## 2024-05-24 DIAGNOSIS — R35.0 FREQUENCY OF URINATION: Primary | ICD-10-CM

## 2024-05-24 LAB
BILIRUBIN, UA POC OHS: ABNORMAL
BLOOD, UA POC OHS: ABNORMAL
CLARITY, UA POC OHS: ABNORMAL
COLOR, UA POC OHS: ABNORMAL
GLUCOSE, UA POC OHS: NEGATIVE
KETONES, UA POC OHS: NEGATIVE
LEUKOCYTES, UA POC OHS: NEGATIVE
NITRITE, UA POC OHS: NEGATIVE
PH, UA POC OHS: 7.5
PROTEIN, UA POC OHS: 30
SPECIFIC GRAVITY, UA POC OHS: 1
UROBILINOGEN, UA POC OHS: 0.2

## 2024-05-24 PROCEDURE — 3044F HG A1C LEVEL LT 7.0%: CPT | Mod: CPTII,S$GLB,,

## 2024-05-24 PROCEDURE — 81002 URINALYSIS NONAUTO W/O SCOPE: CPT | Mod: S$GLB,,,

## 2024-05-24 PROCEDURE — 3079F DIAST BP 80-89 MM HG: CPT | Mod: CPTII,S$GLB,,

## 2024-05-24 PROCEDURE — 3075F SYST BP GE 130 - 139MM HG: CPT | Mod: CPTII,S$GLB,,

## 2024-05-24 PROCEDURE — 1159F MED LIST DOCD IN RCRD: CPT | Mod: CPTII,S$GLB,,

## 2024-05-24 PROCEDURE — 99999 PR PBB SHADOW E&M-EST. PATIENT-LVL III: CPT | Mod: PBBFAC,,,

## 2024-05-24 PROCEDURE — 99212 OFFICE O/P EST SF 10 MIN: CPT | Mod: 25,S$GLB,,

## 2024-05-24 PROCEDURE — 3008F BODY MASS INDEX DOCD: CPT | Mod: CPTII,S$GLB,,

## 2024-05-24 PROCEDURE — 87086 URINE CULTURE/COLONY COUNT: CPT

## 2024-05-24 NOTE — PROGRESS NOTES
Subjective:       Patient ID: Hesham Ordonez is a 34 y.o. female.    Chief Complaint:  Urinary Tract Infection      History of Present Illness  HPI    Patient presents with complaints of urinary frequency x1 week   Denies burning with urination, pelvic pain, urgency of flank pain       GYN & OB History  Patient's last menstrual period was 2024.   Date of Last Pap: 2/10/2021    OB History    Para Term  AB Living   3 3 3 0 0 3   SAB IAB Ectopic Multiple Live Births   0 0 0 0 3      # Outcome Date GA Lbr Virgilio/2nd Weight Sex Type Anes PTL Lv   3 Term 18 39w3d  4.52 kg (9 lb 15.4 oz) M CS-LTranv Spinal N DANIEL   2 Term 16 41w1d  3.94 kg (8 lb 11 oz) M CS-LTranv EPI N DANIEL   1 Term 11/23/10 40w0d  3.771 kg (8 lb 5 oz) M CS-Unspec   DANIEL      Complications: Preeclampsia       Review of Systems  Review of Systems   Constitutional:  Negative for appetite change, fatigue and fever.   Gastrointestinal:  Negative for abdominal pain, bloating, constipation, diarrhea, nausea and vomiting.   Genitourinary:  Positive for frequency. Negative for bladder incontinence, dysmenorrhea, dyspareunia, dysuria, flank pain, genital sores, menorrhagia, menstrual problem, pelvic pain, urgency, vaginal bleeding, vaginal discharge, vaginal pain, postcoital bleeding, vaginal dryness and vaginal odor.   All other systems reviewed and are negative.          Objective:      Physical Exam:   Constitutional: She is oriented to person, place, and time. She appears well-developed and well-nourished.    HENT:   Head: Normocephalic and atraumatic.    Eyes: Pupils are equal, round, and reactive to light. Conjunctivae and EOM are normal.     Cardiovascular:  Normal rate, regular rhythm and normal heart sounds.             Pulmonary/Chest: Effort normal.        Abdominal: Soft. There is no abdominal tenderness.     Genitourinary:    Genitourinary Comments: deferred             Musculoskeletal: Normal range of motion and moves all  extremeties.       Neurological: She is alert and oriented to person, place, and time.    Skin: Skin is warm and dry. She is not diaphoretic.    Psychiatric: She has a normal mood and affect. Her behavior is normal. Judgment and thought content normal.             Assessment:        1. Frequency of urination                  Urine dip in clinic   Specimen sent to lab for culture, will treat with antibiotic if indicated   UTI prevention discussed: Make sure to wipe from front to back, void after intercourse, avoid bubble baths, void regularly, and increase water intake.  RTC if symptoms fail to improve or worsen      Diagnosis and orders this visit:  Frequency of urination  -     Urine culture        Kristi Nunez NP

## 2024-05-26 LAB
BACTERIA UR CULT: NORMAL
BACTERIA UR CULT: NORMAL

## 2024-05-30 ENCOUNTER — OFFICE VISIT (OUTPATIENT)
Dept: PODIATRY | Facility: CLINIC | Age: 34
End: 2024-05-30
Payer: COMMERCIAL

## 2024-05-30 VITALS — BODY MASS INDEX: 38.28 KG/M2 | WEIGHT: 216.06 LBS | HEIGHT: 63 IN

## 2024-05-30 DIAGNOSIS — L60.2 NAIL DISORDER (ONYCHOGRYPHOSIS): Primary | ICD-10-CM

## 2024-05-30 PROCEDURE — 99213 OFFICE O/P EST LOW 20 MIN: CPT | Mod: S$GLB,,, | Performed by: PODIATRIST

## 2024-05-30 PROCEDURE — 3008F BODY MASS INDEX DOCD: CPT | Mod: CPTII,S$GLB,, | Performed by: PODIATRIST

## 2024-05-30 PROCEDURE — 3044F HG A1C LEVEL LT 7.0%: CPT | Mod: CPTII,S$GLB,, | Performed by: PODIATRIST

## 2024-05-30 PROCEDURE — 99999 PR PBB SHADOW E&M-EST. PATIENT-LVL III: CPT | Mod: PBBFAC,,, | Performed by: PODIATRIST

## 2024-05-30 PROCEDURE — 1159F MED LIST DOCD IN RCRD: CPT | Mod: CPTII,S$GLB,, | Performed by: PODIATRIST

## 2024-05-30 NOTE — PROGRESS NOTES
"PODIATRY NOTE    S/   Hesham José Luis is a 34 y.o. female, RTC for follow up status post left hallux total nail avulsion.  Patient has been soaking and covering the toe as instructed.  Denies any F/C/N/V or pain.  No complaints.      O/  Vitals:    05/30/24 1512   Weight: 98 kg (216 lb 0.8 oz)   Height: 5' 3" (1.6 m)   PainSc: 0-No pain       Lower extremity exam:  -Neurovascular status intact.    -Cap refill wnl.    -No edema noted to affected border  -left hallux nail border clean with minimal dry scab from soaking.    -No erythema or drainage.    -No tenderness to palpation of affected border     A/  Encounter Diagnosis   Name Primary?    Nail disorder (onychogryphosis) Yes         P/  -Nail border cleaned. No offending agent or spicule noted.    -Okay to resume activities.  No soaking needed.  -RTC prn    Report Electronically Signed By:     Nelli Rosario DPM   Podiatric Medicine & Surgery  [facility]  5/30/2024                      "

## 2024-06-07 ENCOUNTER — TELEPHONE (OUTPATIENT)
Dept: PODIATRY | Facility: CLINIC | Age: 34
End: 2024-06-07
Payer: COMMERCIAL

## 2024-06-07 NOTE — TELEPHONE ENCOUNTER
LVM for the patient to call Podiatry    ----- Message from Nelli Rosario DPM sent at 6/7/2024 10:39 AM CDT -----  Nail culture confirms fungus. Continue with treatment  ----- Message -----  From: [facility] [employer] [facility]  Sent: 5/22/2024  12:32 PM CDT  To: Nelli Rosario DPM

## 2024-06-07 NOTE — TELEPHONE ENCOUNTER
LVM for pt to give me a call back to get nail culture results.      ----- Message from Nelli Rosario DPM sent at 6/7/2024 10:39 AM CDT -----  Nail culture confirms fungus. Continue with treatment  ----- Message -----  From: [facility] [employer] [facility]  Sent: 5/22/2024  12:32 PM CDT  To: Nelli Rosario DPM

## 2024-06-19 LAB — FUNGUS BLD CULT: ABNORMAL

## 2025-02-20 ENCOUNTER — HOSPITAL ENCOUNTER (OUTPATIENT)
Dept: RADIOLOGY | Facility: HOSPITAL | Age: 35
Discharge: HOME | End: 2025-02-20
Attending: PODIATRIST
Payer: COMMERCIAL

## 2025-02-20 ENCOUNTER — OFFICE VISIT (OUTPATIENT)
Dept: PODIATRY | Facility: CLINIC | Age: 35
End: 2025-02-20
Payer: COMMERCIAL

## 2025-02-20 ENCOUNTER — RESULTS FOLLOW-UP (OUTPATIENT)
Dept: PODIATRY | Facility: CLINIC | Age: 35
End: 2025-02-20

## 2025-02-20 VITALS — HEIGHT: 63 IN | BODY MASS INDEX: 38.28 KG/M2 | WEIGHT: 216.06 LBS

## 2025-02-20 DIAGNOSIS — L03.031 CELLULITIS OF TOE, RIGHT: Primary | ICD-10-CM

## 2025-02-20 DIAGNOSIS — M79.674 TOE PAIN, RIGHT: ICD-10-CM

## 2025-02-20 PROCEDURE — 73630 X-RAY EXAM OF FOOT: CPT | Mod: TC,RT

## 2025-02-20 RX ORDER — IBUPROFEN 800 MG/1
800 TABLET ORAL EVERY 8 HOURS PRN
Qty: 30 TABLET | Refills: 1 | Status: SHIPPED | OUTPATIENT
Start: 2025-02-20

## 2025-02-20 RX ORDER — AMOXICILLIN AND CLAVULANATE POTASSIUM 875; 125 MG/1; MG/1
1 TABLET, FILM COATED ORAL EVERY 12 HOURS
Qty: 20 TABLET | Refills: 0 | Status: SHIPPED | OUTPATIENT
Start: 2025-02-20 | End: 2025-03-02

## 2025-02-20 NOTE — PROGRESS NOTES
Podiatry Department    Patient ID: Hesham Ordonez is a 35 y.o. female.    Chief Complaint: Toe Pain (C/o great right toe pain, pt states she got acrylic on her great toe, pt states its burning pain, pt toe is swollen,, pt states the pain maybe coming the acrylic, pt rates pain 9/10, pt is non-diabetic)    History of Present Illness    CHIEF COMPLAINT:  - Right foot pain and swelling    HPI:  Hesham presents with foot pain and skin peeling. She reports skin peeling when her feet are submerged in water for a while, suggesting a possible fungal infection. Pain is localized to her toe, described as burning, with associated redness. The pain has been present for one day, starting after a visit to the pool yesterday.    She recently had her feet cleaned without polish or acrylics, but on Friday, she got acrylics for birthday pictures. She expresses frustration with recurring foot problems despite maintaining good hygiene practices. She does not wear tight shoes except for a slightly snug pair used during weightlifting. She uses disinfectant spray on her shoes and washes them regularly.    Hesham has a history of toenail issues, with both toenails only growing MCC. She recalls having a toenail removed previously. She has been taking ibuprofen for pain relief. She denies any recent trauma or injury to the foot.    PREVIOUS TREATMENTS:  - Hesham had her toenails removed, with only half of the nails growing back. One nail bed was possibly damaged during the procedure.  - Hesham has been cleaning her feet regularly without applying polish or acrylics.  - Hesham uses disinfectant spray in her shoes and washes them regularly.    IMAGING:  - X-ray of the foot (non-weight bearing): Results not yet available.    MEDICATIONS:  - Ibuprofen: Hesham has been taking for current foot pain    WORK STATUS:  - Hesham engages in weightlifting activities.      ROS:  Musculoskeletal: -joint pain, +limb  swelling  Integumentary: +rash            Physical Exam    Musculoskeletal: Pain at IPJ.  Skin: Erythema on right hallux. Right hallux edematous proximal nail fold. Acrylic overlay over right hallux toenail intact. Negative palpation on acrylic toenail.           Diagnoses:  1. Cellulitis of toe, right    2. Toe pain, right  -     X-Ray Foot Complete Right; Future; Expected date: 02/20/2025    Other orders  -     amoxicillin-clavulanate 875-125mg (AUGMENTIN) 875-125 mg per tablet; Take 1 tablet by mouth every 12 (twelve) hours. for 10 days  Dispense: 20 tablet; Refill: 0  -     ibuprofen (ADVIL,MOTRIN) 800 MG tablet; Take 1 tablet (800 mg total) by mouth every 8 (eight) hours as needed for Pain.  Dispense: 30 tablet; Refill: 1        Assessment & Plan      MEDICATIONS:  - Started Augmentin.  - Started ibuprofen 800mg for pain.    IMAGING:  - Ordered XR foot non-weight-bearing to rule out fracture.    PROCEDURES:  - Provided surgical shoe for comfort and support.    FOLLOW UP:  - Follow up in 10 days.  - Results will be released on [employer].    PATIENT INSTRUCTIONS:  - Soak feet in Epsom salt twice daily.  - Use antifungal powder in shoes.  - Consider using moisturizer like Aquaphor or CeraVe cream for dry, peeling skin.            Future Appointments   Date Time Provider Department Center   3/10/2025  1:00 PM Nelli Rosario DPM [facility]        This note was generated with the assistance of ambient listening technology. Verbal consent was obtained by the patient and accompanying visitor(s) for the recording of patient appointment to facilitate this note. I attest to having reviewed and edited the generated note for accuracy, though some syntax or spelling errors may persist. Please contact the author of this note for any clarification.      Report Electronically Signed By:     Nelli Rosario DPM   Podiatry  [facility]   2/20/2025

## 2025-02-27 ENCOUNTER — LAB VISIT (OUTPATIENT)
Dept: LAB | Facility: HOSPITAL | Age: 35
End: 2025-02-27
Attending: NURSE PRACTITIONER
Payer: COMMERCIAL

## 2025-02-27 ENCOUNTER — OFFICE VISIT (OUTPATIENT)
Dept: OBSTETRICS AND GYNECOLOGY | Facility: CLINIC | Age: 35
End: 2025-02-27
Payer: COMMERCIAL

## 2025-02-27 VITALS
HEIGHT: 63 IN | SYSTOLIC BLOOD PRESSURE: 110 MMHG | BODY MASS INDEX: 35.7 KG/M2 | DIASTOLIC BLOOD PRESSURE: 88 MMHG | WEIGHT: 201.5 LBS

## 2025-02-27 DIAGNOSIS — Z11.3 SCREENING EXAMINATION FOR STD (SEXUALLY TRANSMITTED DISEASE): Primary | ICD-10-CM

## 2025-02-27 DIAGNOSIS — N92.0 MENORRHAGIA WITH REGULAR CYCLE: ICD-10-CM

## 2025-02-27 DIAGNOSIS — Z11.3 SCREENING EXAMINATION FOR STD (SEXUALLY TRANSMITTED DISEASE): ICD-10-CM

## 2025-02-27 LAB
BASOPHILS # BLD AUTO: 0.03 K/UL (ref 0–0.2)
BASOPHILS NFR BLD: 0.6 % (ref 0–1.9)
DIFFERENTIAL METHOD BLD: ABNORMAL
EOSINOPHIL # BLD AUTO: 0.2 K/UL (ref 0–0.5)
EOSINOPHIL NFR BLD: 3 % (ref 0–8)
ERYTHROCYTE [DISTWIDTH] IN BLOOD BY AUTOMATED COUNT: 12.1 % (ref 11.5–14.5)
HCT VFR BLD AUTO: 42.2 % (ref 37–48.5)
HGB BLD-MCNC: 13.7 G/DL (ref 12–16)
HIV 1+2 AB+HIV1 P24 AG SERPL QL IA: NORMAL
IMM GRANULOCYTES # BLD AUTO: 0.01 K/UL (ref 0–0.04)
IMM GRANULOCYTES NFR BLD AUTO: 0.2 % (ref 0–0.5)
LYMPHOCYTES # BLD AUTO: 2.4 K/UL (ref 1–4.8)
LYMPHOCYTES NFR BLD: 47.7 % (ref 18–48)
MCH RBC QN AUTO: 31.7 PG (ref 27–31)
MCHC RBC AUTO-ENTMCNC: 32.5 G/DL (ref 32–36)
MCV RBC AUTO: 98 FL (ref 82–98)
MONOCYTES # BLD AUTO: 0.4 K/UL (ref 0.3–1)
MONOCYTES NFR BLD: 7.9 % (ref 4–15)
NEUTROPHILS # BLD AUTO: 2.1 K/UL (ref 1.8–7.7)
NEUTROPHILS NFR BLD: 40.6 % (ref 38–73)
NRBC BLD-RTO: 0 /100 WBC
PLATELET # BLD AUTO: 291 K/UL (ref 150–450)
PMV BLD AUTO: 10.8 FL (ref 9.2–12.9)
RBC # BLD AUTO: 4.32 M/UL (ref 4–5.4)
TREPONEMA PALLIDUM IGG+IGM AB [PRESENCE] IN SERUM OR PLASMA BY IMMUNOASSAY: NONREACTIVE
TSH SERPL DL<=0.005 MIU/L-ACNC: 0.82 UIU/ML (ref 0.4–4)
WBC # BLD AUTO: 5.05 K/UL (ref 3.9–12.7)

## 2025-02-27 PROCEDURE — 87591 N.GONORRHOEAE DNA AMP PROB: CPT | Performed by: NURSE PRACTITIONER

## 2025-02-27 PROCEDURE — 36415 COLL VENOUS BLD VENIPUNCTURE: CPT | Performed by: NURSE PRACTITIONER

## 2025-02-27 PROCEDURE — 85025 COMPLETE CBC W/AUTO DIFF WBC: CPT | Performed by: NURSE PRACTITIONER

## 2025-02-27 PROCEDURE — 80074 ACUTE HEPATITIS PANEL: CPT | Performed by: NURSE PRACTITIONER

## 2025-02-27 PROCEDURE — 86593 SYPHILIS TEST NON-TREP QUANT: CPT | Performed by: NURSE PRACTITIONER

## 2025-02-27 PROCEDURE — 84443 ASSAY THYROID STIM HORMONE: CPT | Performed by: NURSE PRACTITIONER

## 2025-02-27 PROCEDURE — 87389 HIV-1 AG W/HIV-1&-2 AB AG IA: CPT | Performed by: NURSE PRACTITIONER

## 2025-02-27 NOTE — PROGRESS NOTES
"  Subjective:       Patient ID: Hesham Ordonez is a 35 y.o. female.    Chief Complaint:  Annual Exam      History of Present Illness  HPI  {OBG HPI BLOCKS:03817}  Health Maintenance   Topic Date Due    Influenza Vaccine (1) Never done    COVID-19 Vaccine ( season) 2024    TETANUS VACCINE  10/09/2027    Cervical Cancer Screening  2029    RSV Vaccine (Age 60+ and Pregnant patients) (1 - 1-dose 75+ series) 2066    Hepatitis C Screening  Completed    HIV Screening  Completed    Lipid Panel  Completed    Pneumococcal Vaccines (Age 0-49)  Aged Out     GYN & OB History  No LMP recorded.   Date of Last Pap: 2024    OB History    Para Term  AB Living   3 3 3 0 0 3   SAB IAB Ectopic Multiple Live Births   0 0 0 0 3      # Outcome Date GA Lbr Virgilio/2nd Weight Sex Type Anes PTL Lv   3 Term 18 39w3d  4.52 kg (9 lb 15.4 oz) M CS-LTranv Spinal N DANIEL   2 Term 16 41w1d  3.94 kg (8 lb 11 oz) M CS-LTranv EPI N DANIEL   1 Term 11/23/10 40w0d  3.771 kg (8 lb 5 oz) M CS-Unspec   DANIEL      Complications: Preeclampsia       Review of Systems  Review of Systems        Objective:   /88 (Patient Position: Sitting)   Ht 5' 3" (1.6 m)   Wt 91.4 kg (201 lb 8 oz)   BMI 35.69 kg/m²    Physical Exam     Assessment:        1. Screening examination for STD (sexually transmitted disease)    2. Menorrhagia with regular cycle       ***         Plan:      ***      Hesham Manrique" was seen today for annual exam.    Diagnoses and all orders for this visit:    Screening examination for STD (sexually transmitted disease)  -     C. trachomatis/N. gonorrhoeae by AMP DNA [facility]; Urine; Future  -     HIV 1/2 Ag/Ab (4th Gen); Future  -     Treponema Pallidium Antibodies IgG, IgM; Future  -     Hepatitis Panel, Acute; Future    Menorrhagia with regular cycle  -     CBC Auto Differential; Future  -     TSH; Future  -     US Pelvis Complete Non OB; Future        "

## 2025-02-28 ENCOUNTER — RESULTS FOLLOW-UP (OUTPATIENT)
Dept: OBSTETRICS AND GYNECOLOGY | Facility: CLINIC | Age: 35
End: 2025-02-28

## 2025-02-28 LAB
HAV IGM SERPL QL IA: NORMAL
HBV CORE IGM SERPL QL IA: NORMAL
HBV SURFACE AG SERPL QL IA: NORMAL
HCV AB SERPL QL IA: NORMAL

## 2025-03-01 LAB
C TRACH DNA SPEC QL NAA+PROBE: NOT DETECTED
N GONORRHOEA DNA SPEC QL NAA+PROBE: NOT DETECTED

## 2025-03-06 ENCOUNTER — HOSPITAL ENCOUNTER (OUTPATIENT)
Dept: RADIOLOGY | Facility: HOSPITAL | Age: 35
Discharge: HOME | End: 2025-03-06
Attending: NURSE PRACTITIONER
Payer: COMMERCIAL

## 2025-03-06 DIAGNOSIS — N92.0 MENORRHAGIA WITH REGULAR CYCLE: ICD-10-CM

## 2025-03-06 PROCEDURE — 76830 TRANSVAGINAL US NON-OB: CPT | Mod: TC

## 2025-03-06 PROCEDURE — 76856 US EXAM PELVIC COMPLETE: CPT | Mod: 26,,, | Performed by: RADIOLOGY

## 2025-03-06 PROCEDURE — 76830 TRANSVAGINAL US NON-OB: CPT | Mod: 26,,, | Performed by: RADIOLOGY

## 2025-03-10 ENCOUNTER — OFFICE VISIT (OUTPATIENT)
Dept: PODIATRY | Facility: CLINIC | Age: 35
End: 2025-03-10
Payer: COMMERCIAL

## 2025-03-10 VITALS — HEIGHT: 63 IN | BODY MASS INDEX: 35.7 KG/M2 | WEIGHT: 201.5 LBS

## 2025-03-10 DIAGNOSIS — L03.031 CELLULITIS OF TOE, RIGHT: ICD-10-CM

## 2025-03-10 DIAGNOSIS — L60.2 NAIL DISORDER (ONYCHOGRYPHOSIS): Primary | ICD-10-CM

## 2025-03-10 PROCEDURE — 3008F BODY MASS INDEX DOCD: CPT | Mod: CPTII,S$GLB,, | Performed by: PODIATRIST

## 2025-03-10 PROCEDURE — 99213 OFFICE O/P EST LOW 20 MIN: CPT | Mod: S$GLB,,, | Performed by: PODIATRIST

## 2025-03-10 PROCEDURE — 99999 PR PBB SHADOW E&M-EST. PATIENT-LVL III: CPT | Mod: PBBFAC,,, | Performed by: PODIATRIST

## 2025-03-10 PROCEDURE — 1159F MED LIST DOCD IN RCRD: CPT | Mod: CPTII,S$GLB,, | Performed by: PODIATRIST

## 2025-03-10 RX ORDER — NEOMYCIN SULFATE, POLYMYXIN B SULFATE, HYDROCORTISONE 3.5; 10000; 1 MG/ML; [USP'U]/ML; MG/ML
SOLUTION/ DROPS AURICULAR (OTIC)
Qty: 10 ML | Refills: 0 | Status: SHIPPED | OUTPATIENT
Start: 2025-03-10

## 2025-03-10 NOTE — PROGRESS NOTES
Podiatry Department    Patient ID: Hesham Ordonez is a 35 y.o. female.    Chief Complaint: Follow-up (F/u cellulitis. Great toe RLE. Non diabetic. 0/10 pain rating. Pt wears casual shoes and stockings. Pt last PCP appt was 5/8/2024 with Dr Medina Boston.)    History of Present Illness    CHIEF COMPLAINT:  - Right great toe cellulitis follow-up    HPI:  Hesham presents for follow-up of right great toe cellulitis, which developed after having acrylic applied to her great toe. She completed a course of oral antibiotics (Augmentin). Pain in her right great toenail has completely resolved. The cellulitis has largely resolved, though some residual effects remain. She has an onychomycotic toenail (fungal nail infection) on the affected toe. Total nail avulsion was discussed as a treatment option for the fungal nail, but she opted to delay this procedure.    She denies erythema and drainage.    PREVIOUS TREATMENTS:  - Augmentin: Completed course for right great toe cellulitis, resolved pain completely    MEDICATIONS:  - Augmentin: Completed course for right great toe cellulitis      ROS:  Musculoskeletal: -joint pain            Physical Exam    Skin: Mycotic fungal toenail. Mild edema proximal nail fold.           Diagnoses:  1. Nail disorder (onychogryphosis)    2. Cellulitis of toe, right    Other orders  -     neomycin-polymyxin-hydrocortisone (CORTISPORIN) otic solution; Use two drops, twice daily to affected area  Dispense: 10 mL; Refill: 0          Assessment & Plan      Improvement in nail  Pt declines surgery  Topical therapy and soaking x 5 additional days    No future appointments.       This note was generated with the assistance of ambient listening technology. Verbal consent was obtained by the patient and accompanying visitor(s) for the recording of patient appointment to facilitate this note. I attest to having reviewed and edited the generated note for accuracy, though some syntax or spelling  errors may persist. Please contact the author of this note for any clarification.      Report Electronically Signed By:     Nelli Rosario DPM   Podiatry  [facility]  3/10/2025

## 2025-04-08 ENCOUNTER — OFFICE VISIT (OUTPATIENT)
Dept: INTERNAL MEDICINE | Facility: CLINIC | Age: 35
End: 2025-04-08
Payer: COMMERCIAL

## 2025-04-08 VITALS
TEMPERATURE: 99 F | HEIGHT: 63 IN | OXYGEN SATURATION: 98 % | SYSTOLIC BLOOD PRESSURE: 118 MMHG | WEIGHT: 205.69 LBS | BODY MASS INDEX: 36.45 KG/M2 | DIASTOLIC BLOOD PRESSURE: 82 MMHG | HEART RATE: 92 BPM

## 2025-04-08 DIAGNOSIS — E66.812 CLASS 2 OBESITY DUE TO EXCESS CALORIES WITHOUT SERIOUS COMORBIDITY WITH BODY MASS INDEX (BMI) OF 36.0 TO 36.9 IN ADULT: ICD-10-CM

## 2025-04-08 DIAGNOSIS — E66.09 CLASS 2 OBESITY DUE TO EXCESS CALORIES WITHOUT SERIOUS COMORBIDITY WITH BODY MASS INDEX (BMI) OF 36.0 TO 36.9 IN ADULT: ICD-10-CM

## 2025-04-08 DIAGNOSIS — L02.92 BOIL: ICD-10-CM

## 2025-04-08 DIAGNOSIS — L30.4 INTERTRIGO: ICD-10-CM

## 2025-04-08 DIAGNOSIS — F41.1 GAD (GENERALIZED ANXIETY DISORDER): ICD-10-CM

## 2025-04-08 DIAGNOSIS — F33.1 MODERATE EPISODE OF RECURRENT MAJOR DEPRESSIVE DISORDER: ICD-10-CM

## 2025-04-08 DIAGNOSIS — M25.562 CHRONIC PAIN OF LEFT KNEE: Primary | ICD-10-CM

## 2025-04-08 DIAGNOSIS — F43.10 PTSD (POST-TRAUMATIC STRESS DISORDER): ICD-10-CM

## 2025-04-08 DIAGNOSIS — Z76.89 ENCOUNTER TO ESTABLISH CARE: ICD-10-CM

## 2025-04-08 DIAGNOSIS — G89.29 CHRONIC PAIN OF LEFT KNEE: Primary | ICD-10-CM

## 2025-04-08 PROCEDURE — 99999 PR PBB SHADOW E&M-EST. PATIENT-LVL IV: CPT | Mod: PBBFAC,,, | Performed by: FAMILY MEDICINE

## 2025-04-08 RX ORDER — MUPIROCIN 20 MG/G
OINTMENT TOPICAL 3 TIMES DAILY
Qty: 22 G | Refills: 0 | Status: SHIPPED | OUTPATIENT
Start: 2025-04-08

## 2025-04-08 RX ORDER — NYSTATIN 100000 U/G
CREAM TOPICAL 2 TIMES DAILY
Qty: 30 G | Refills: 2 | Status: SHIPPED | OUTPATIENT
Start: 2025-04-08

## 2025-04-08 RX ORDER — PHENTERMINE HYDROCHLORIDE 37.5 MG/1
37.5 TABLET ORAL
Qty: 30 TABLET | Refills: 0 | Status: SHIPPED | OUTPATIENT
Start: 2025-04-08 | End: 2025-05-08

## 2025-04-23 ENCOUNTER — OFFICE VISIT (OUTPATIENT)
Dept: INTERNAL MEDICINE | Facility: CLINIC | Age: 35
End: 2025-04-23
Payer: COMMERCIAL

## 2025-04-23 ENCOUNTER — HOSPITAL ENCOUNTER (OUTPATIENT)
Dept: RADIOLOGY | Facility: HOSPITAL | Age: 35
Discharge: HOME | End: 2025-04-23
Attending: NURSE PRACTITIONER
Payer: COMMERCIAL

## 2025-04-23 VITALS
HEART RATE: 96 BPM | TEMPERATURE: 98 F | OXYGEN SATURATION: 98 % | SYSTOLIC BLOOD PRESSURE: 128 MMHG | DIASTOLIC BLOOD PRESSURE: 80 MMHG | HEIGHT: 63 IN | BODY MASS INDEX: 37.57 KG/M2 | WEIGHT: 212.06 LBS

## 2025-04-23 DIAGNOSIS — R05.1 ACUTE COUGH: ICD-10-CM

## 2025-04-23 DIAGNOSIS — J02.9 SORE THROAT: ICD-10-CM

## 2025-04-23 DIAGNOSIS — R10.11 RIGHT UPPER QUADRANT ABDOMINAL PAIN: ICD-10-CM

## 2025-04-23 DIAGNOSIS — Z20.828 EXPOSURE TO INFLUENZA: Primary | ICD-10-CM

## 2025-04-23 LAB
CTP QC/QA: YES
MOLECULAR STREP A: NEGATIVE

## 2025-04-23 PROCEDURE — 87651 STREP A DNA AMP PROBE: CPT | Mod: QW,S$GLB,, | Performed by: NURSE PRACTITIONER

## 2025-04-23 PROCEDURE — G2211 COMPLEX E/M VISIT ADD ON: HCPCS | Mod: S$GLB,,, | Performed by: NURSE PRACTITIONER

## 2025-04-23 PROCEDURE — 3074F SYST BP LT 130 MM HG: CPT | Mod: CPTII,S$GLB,, | Performed by: NURSE PRACTITIONER

## 2025-04-23 PROCEDURE — 3079F DIAST BP 80-89 MM HG: CPT | Mod: CPTII,S$GLB,, | Performed by: NURSE PRACTITIONER

## 2025-04-23 PROCEDURE — 74019 RADEX ABDOMEN 2 VIEWS: CPT | Mod: TC

## 2025-04-23 PROCEDURE — 99999 PR PBB SHADOW E&M-EST. PATIENT-LVL IV: CPT | Mod: PBBFAC,,, | Performed by: NURSE PRACTITIONER

## 2025-04-23 PROCEDURE — 3008F BODY MASS INDEX DOCD: CPT | Mod: CPTII,S$GLB,, | Performed by: NURSE PRACTITIONER

## 2025-04-23 PROCEDURE — 74019 RADEX ABDOMEN 2 VIEWS: CPT | Mod: 26,,, | Performed by: STUDENT IN AN ORGANIZED HEALTH CARE EDUCATION/TRAINING PROGRAM

## 2025-04-23 PROCEDURE — 99213 OFFICE O/P EST LOW 20 MIN: CPT | Mod: S$GLB,,, | Performed by: NURSE PRACTITIONER

## 2025-04-24 ENCOUNTER — TELEPHONE (OUTPATIENT)
Dept: INTERNAL MEDICINE | Facility: CLINIC | Age: 35
End: 2025-04-24
Payer: COMMERCIAL

## 2025-04-24 ENCOUNTER — RESULTS FOLLOW-UP (OUTPATIENT)
Dept: INTERNAL MEDICINE | Facility: CLINIC | Age: 35
End: 2025-04-24

## 2025-04-24 ENCOUNTER — HOSPITAL ENCOUNTER (OUTPATIENT)
Dept: RADIOLOGY | Facility: HOSPITAL | Age: 35
Discharge: HOME | End: 2025-04-24
Attending: FAMILY MEDICINE
Payer: COMMERCIAL

## 2025-04-24 DIAGNOSIS — R91.8 OPACITY OF LUNG ON IMAGING STUDY: Primary | ICD-10-CM

## 2025-04-24 DIAGNOSIS — R91.8 OPACITY OF LUNG ON IMAGING STUDY: ICD-10-CM

## 2025-04-24 PROCEDURE — 71046 X-RAY EXAM CHEST 2 VIEWS: CPT | Mod: 26,,, | Performed by: STUDENT IN AN ORGANIZED HEALTH CARE EDUCATION/TRAINING PROGRAM

## 2025-04-24 PROCEDURE — 71046 X-RAY EXAM CHEST 2 VIEWS: CPT | Mod: TC

## 2025-04-24 NOTE — TELEPHONE ENCOUNTER
----- Message from Kami Colon MD sent at 4/24/2025 11:47 AM CDT -----  Please let patient know that she is constipated on her abdominal xray. However there is an incidental opacity found on the chest. I need to have patient do a dedicated xray to take a look at it  ----- Message -----  From: Interface, Rad Results In  Sent: 4/24/2025   7:50 AM CDT  To: Kami Colon MD

## 2025-04-24 NOTE — PROGRESS NOTES
Subjective     Patient ID: Hesham Ordonez is a 35 y.o. female.    Chief Complaint: Establish Care    History of Present Illness    Hesham presents for an initial visit to establish care, with concerns about weight loss, left knee pain, and marital counseling options.    HPI:  Hesham reports chronic left knee issues attributed to childhood sports activities. She was previously recommended physical therapy but found attendance challenging due to work and family commitments. She began consistent exercise in  of last year, noting some knee improvement. Her routine includes calisthenics and exercises to improve knee circulation, which she finds beneficial. She has pain in the anterior knee, beneath the patella and laterally, particularly during high-impact activities such as jogging or running. The pain is most pronounced during physical activities and impact.    She discusses recent weight loss efforts, expressing concern about abdominal skin laxity, particularly around her  scar. This has resulted in rashes beneath the loose skin, which she has been treating with OTC cortisone cream. She reports difficulty maintaining healthy eating habits throughout the week and struggles with overeating, even with nutritious options like salads. She also mentions occasional consumption of fast food.    She reveals brief use of psychiatric medication, which she discontinued after 3 months due to perceived ineffectiveness. She expresses interest in therapy but reports unsatisfactory past experiences, including a therapist at the [place] location with whom she did not establish rapport.    Regarding weight loss medications, she reports a 1-week trial of a medication (possibly Adipex) borrowed from a friend. She noticed effects but had dry mouth as a side effect, which interfered with her ability to conduct classes and speak comfortably. She also reports previous use of semaglutide from October to mid-December, which  aided weight loss but induced feelings of depression due to appetite suppression and decreased food enjoyment.    She mentions recent issues with boils, particularly one currently developing on her inner thigh, which she attributes to friction and perspiration.    She discusses potential ADHD symptoms, including difficulty focusing and abrupt conversation switching. She reports previous diagnoses of bipolar disorder, depression, and anxiety by a physician, though she was reluctant to accept the bipolar diagnosis.    She denies any current active rash.    MEDICATIONS:  Hesham is on cortisone cream, applied to a rash under her  scar. She is also on Xanax for anxiety and uses medical marijuana. She discontinued an unspecified medication prescribed by her psychiatrist after 3 months due to lack of noticeable effect. Hesham stopped Semaglutide (generic form of Ozempic) after using it from October to mid-December of last year due to side effects of depression and loss of appetite. She also discontinued Trileptal 150mg and 300mg due to an accidental overdose, and stopped Vyvanse 30mg.    MEDICAL HISTORY:  Hesham has a history of depression and anxiety.    SURGICAL HISTORY:  Hesham has undergone a  section.    TEST RESULTS:  In May 2022, the patient's glucose level was 78 mg/dL, which was normal. Her lipid panel showed slightly elevated triglycerides. The thyroid test results were normal. Her Vitamin D levels were slightly low. The A1c test results were normal.    SOCIAL HISTORY:  Occupation: Certified nutrition educator    Marital status:     ROS:  General: -fever, -chills, -fatigue, -weight gain, -weight loss  Eyes: -vision changes, -redness, -discharge  ENT: -ear pain, -nasal congestion, -sore throat  Cardiovascular: -chest pain, -palpitations, -lower extremity edema  Respiratory: -cough, -shortness of breath  Gastrointestinal: -abdominal pain, -nausea, -vomiting, -diarrhea,  -constipation, -blood in stool, +food binging, +increased appetite  Genitourinary: -dysuria, -hematuria, -frequency  Musculoskeletal: +joint pain, -muscle pain, +limb pain, +pain with movement, +lower extremity pain with movement  Skin: +rash, -lesion, +excessive sweating  Neurological: -headache, -dizziness, -numbness, -tingling  Psychiatric: -anxiety, -depression, -sleep difficulty, +lack of focus/concentration, +increased distractibility            Objective     Physical Exam  Vitals and nursing note reviewed.   Constitutional:       Appearance: Normal appearance.   HENT:      Head: Normocephalic and atraumatic.   Pulmonary:      Effort: Pulmonary effort is normal.   Neurological:      General: No focal deficit present.      Mental Status: She is alert and oriented to person, place, and time.   Psychiatric:         Mood and Affect: Mood normal.         Behavior: Behavior normal.                Assessment and Plan     1. Chronic pain of left knee  -     Ambulatory referral/consult to Orthopedics; Future; Expected date: 04/15/2025    2. Intertrigo  -     nystatin (MYCOSTATIN) cream; Apply topically 2 (two) times daily. For skin rash  Dispense: 30 g; Refill: 2    3. Boil  -     mupirocin (BACTROBAN) 2 % ointment; Apply topically 3 (three) times daily.  Dispense: 22 g; Refill: 0    4. Class 2 obesity due to excess calories without serious comorbidity with body mass index (BMI) of 36.0 to 36.9 in adult  -     phentermine (ADIPEX-P) 37.5 mg tablet; Take 1 tablet (37.5 mg total) by mouth before breakfast.  Dispense: 30 tablet; Refill: 0    5. Encounter to establish care    6. Moderate episode of recurrent major depressive disorder    7. PTSD (post-traumatic stress disorder)    8. ANA (generalized anxiety disorder)        Assessment & Plan    Assessed left knee pain, likely due to old injury or overuse.  Evaluated weight loss efforts and associated loose skin.  Identified yeast rash under  scar.  Discussed weight  management options and medications, including:   Adipex: short-term use up to 3 months, appetite suppression, potential for heart arrhythmias   Qsymia: combination of Adipex and Topiramate, lower dosage of Adipex   GLP-1 agonists (Wegovy, Zepbound): slow digestion, hormonal effects on appetite.  Considered ADHD as potential underlying factor for focus issues and eating behaviors.  Noted history of depression and anxiety, previously prescribed Vyvanse 30 mg.  Evaluated boil on inner thigh, likely due to friction, sweat, and inflamed hair follicles.    PATIENT EDUCATION:   Explained that loose skin after weight loss may not fully regain elasticity, but exercise can help tighten abdominal muscles.   Explained ADHD testing process for professional diagnosis.    ACTION ITEMS/LIFESTYLE:   Dequaysha to apply warm compress to boil to promote drainage and cover draining boil to prevent spread of fluid.   Recommend using powder (e.g., Gavino Body) to keep skin dry in areas prone to rash.   Dequaysha to continue exercising to strengthen abdominal muscles for tightening loose skin.   Follow up with insurance to check coverage for Wegovy or Zepbound.    MEDICATIONS:   Started Adipex for weight loss, prescribed for up to 3 months.   Started nystatin cream, apply twice daily to skin rash for about 10 days.   Started mupirocin ointment, apply to boil twice daily.    REFERRALS:   Referred to orthopedics for left knee evaluation.   Consider [facility] ([facility]) for tele-visit therapy options, including personal and marital counseling.    FOLLOW UP:   Contact the office if interested in compound pharmacy options for weight loss medications.       I spent a total of 45 minutes on the day of the visit.  This includes face to face time and non-face to face time preparing to see the patient (eg, review of tests), obtaining and/or reviewing separately obtained history, documenting clinical information in the electronic or other health  record, independently interpreting results and communicating results to the patient/family/caregiver, or care coordinator.        Follow up in about 6 weeks (around 5/20/2025).    This note was generated with the assistance of ambient listening technology. Verbal consent was obtained by the patient and accompanying visitor(s) for the recording of patient appointment to facilitate this note. I attest to having reviewed and edited the generated note for accuracy, though some syntax or spelling errors may persist. Please contact the author of this note for any clarification.

## 2025-04-24 NOTE — TELEPHONE ENCOUNTER
Per Dr Sierra I called pt with abdomen xray results ( Constipation ) and also discussed the fact that Dr Sierra wants her to come in to do a chest xray due to the incidental opacity found on the chest. On the abdomen xray. Pt agrees and understands. I let her know that she come to our xray department., the [place] or [facility]. The orders are in.   X-Ray Abdomen Flat And Erect

## 2025-05-01 ENCOUNTER — PATIENT MESSAGE (OUTPATIENT)
Dept: INTERNAL MEDICINE | Facility: CLINIC | Age: 35
End: 2025-05-01
Payer: COMMERCIAL

## 2025-06-24 ENCOUNTER — OFFICE VISIT (OUTPATIENT)
Dept: INTERNAL MEDICINE | Facility: CLINIC | Age: 35
End: 2025-06-24
Payer: COMMERCIAL

## 2025-06-24 VITALS
HEIGHT: 63 IN | WEIGHT: 205.25 LBS | SYSTOLIC BLOOD PRESSURE: 112 MMHG | BODY MASS INDEX: 36.37 KG/M2 | DIASTOLIC BLOOD PRESSURE: 72 MMHG | OXYGEN SATURATION: 98 % | HEART RATE: 97 BPM | TEMPERATURE: 98 F

## 2025-06-24 DIAGNOSIS — F41.1 GENERALIZED ANXIETY DISORDER WITH PANIC ATTACKS: ICD-10-CM

## 2025-06-24 DIAGNOSIS — F41.0 GENERALIZED ANXIETY DISORDER WITH PANIC ATTACKS: ICD-10-CM

## 2025-06-24 DIAGNOSIS — R53.83 FATIGUE, UNSPECIFIED TYPE: Primary | ICD-10-CM

## 2025-06-24 DIAGNOSIS — R41.840 INATTENTION: ICD-10-CM

## 2025-06-24 DIAGNOSIS — L30.9 DERMATITIS: ICD-10-CM

## 2025-06-24 PROCEDURE — 1160F RVW MEDS BY RX/DR IN RCRD: CPT | Mod: CPTII,S$GLB,, | Performed by: FAMILY MEDICINE

## 2025-06-24 PROCEDURE — 1159F MED LIST DOCD IN RCRD: CPT | Mod: CPTII,S$GLB,, | Performed by: FAMILY MEDICINE

## 2025-06-24 PROCEDURE — 99214 OFFICE O/P EST MOD 30 MIN: CPT | Mod: S$GLB,,, | Performed by: FAMILY MEDICINE

## 2025-06-24 PROCEDURE — 3008F BODY MASS INDEX DOCD: CPT | Mod: CPTII,S$GLB,, | Performed by: FAMILY MEDICINE

## 2025-06-24 PROCEDURE — 3078F DIAST BP <80 MM HG: CPT | Mod: CPTII,S$GLB,, | Performed by: FAMILY MEDICINE

## 2025-06-24 PROCEDURE — G2211 COMPLEX E/M VISIT ADD ON: HCPCS | Mod: S$GLB,,, | Performed by: FAMILY MEDICINE

## 2025-06-24 PROCEDURE — 3074F SYST BP LT 130 MM HG: CPT | Mod: CPTII,S$GLB,, | Performed by: FAMILY MEDICINE

## 2025-06-24 PROCEDURE — 99999 PR PBB SHADOW E&M-EST. PATIENT-LVL IV: CPT | Mod: PBBFAC,,, | Performed by: FAMILY MEDICINE

## 2025-06-24 RX ORDER — ALPRAZOLAM 0.25 MG/1
0.25 TABLET ORAL 2 TIMES DAILY PRN
Qty: 60 TABLET | Refills: 0 | Status: SHIPPED | OUTPATIENT
Start: 2025-06-24

## 2025-06-25 NOTE — PROGRESS NOTES
Subjective     Patient ID: Hesham Ordonez is a 35 y.o. female.    Chief Complaint: Follow-up (6 weeks)    History of Present Illness    PRESENTATION:  Hesham presents with concerns about fatigue, anxiety, and difficulty focusing, as well as a desire to discuss medication options and potential ADHD evaluation.    HPI:  Hesham reports fatigue for the past 3 weeks, constant and severe enough to necessitate rest during the day. She wakes up feeling tired even after exercise, which typically improves her energy levels.    She describes anxiety, particularly in loud or crowded environments. She feels overwhelmed and overstimulated in school settings with children and busy public places. These situations can trigger panic attacks, which significantly impact her day and require rest afterwards.    She mentions difficulty focusing on tasks at work, struggling to resume work after interruptions, often missing deadlines or having incomplete assignments. During conversations, she frequently changes topics unintentionally, which others have noticed.    She has been using medical marijuana to manage her symptoms, including anxiety and sleep issues. She reports improved sleep with marijuana use but is attempting to reduce consumption. She previously took Xanax, which she found beneficial, and is interested in potentially resuming its use.    She also mentions a skin irritation initially mistaken for an insect bite. It has been present for 5-6 days and causes discomfort when showering. She has applied various treatments including an antifungal cream, Neosporin, alcohol, and hydrocortisone.    She is currently seeing a therapist named Audrey, whom she finds helpful. She cancelled her most recent appointment due to concerns about emotional distress and not wanting to be in a compromised state for her brother-in-law's birthday.    She disagrees with a previous bipolar disorder diagnosis from   [facility].    MEDICATIONS:  Hesham is using medical marijuana for anxiety and sleep. She has some alprazolam (Xanax) left from a previous prescription for anxiety.         TEST RESULTS:  In February, the patient underwent thyroid, blood count, and other labs, all of which yielded normal results.    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[employer], part-time  on weekends  Marital status:       ROS:  General: -fever, -chills, +fatigue, -weight gain, -weight loss, +sleep disturbances  Eyes: -vision changes, -redness, -discharge  ENT: -ear pain, -nasal congestion, -sore throat  Cardiovascular: -chest pain, -palpitations, -lower extremity edema  Respiratory: -cough, -shortness of breath  Gastrointestinal: -abdominal pain, -nausea, -vomiting, -diarrhea, -constipation, -blood in stool  Genitourinary: -dysuria, -hematuria, -frequency  Musculoskeletal: +joint pain, -muscle pain, +limb pain  Skin: -rash, -lesion  Neurological: -headache, -dizziness, -numbness, -tingling  Psychiatric: +anxiety, -depression, -sleep difficulty, +panic attacks, +lack of focus/concentration, +increased distractibility            Objective     Physical Exam  Vitals and nursing note reviewed.   Constitutional:       Appearance: Normal appearance.   HENT:      Head: Normocephalic and atraumatic.   Pulmonary:      Effort: Pulmonary effort is normal.   Neurological:      General: No focal deficit present.      Mental Status: She is alert and oriented to person, place, and time.   Psychiatric:         Mood and Affect: Mood normal.         Behavior: Behavior normal.                Assessment and Plan     1. Fatigue, unspecified type    2. Generalized anxiety disorder with panic attacks  -     ALPRAZolam (XANAX) 0.25 MG tablet; Take 1 tablet (0.25 mg total) by mouth 2 (two) times daily as needed for Anxiety.  Dispense: 60 tablet; Refill: 0    3. Inattention  -     Ambulatory referral/consult to Adult  Neuropsychology; Future; Expected date: 07/01/2025    4. Dermatitis        Assessment & Plan    Assessed reported fatigue, considering recent lab results and potential contributing factors like anxiety, heat exposure, and work-related stress.  Evaluated concerns about focus and anxiety, considering possible ADHD.  Determined need for ADHD evaluation to clarify diagnosis and inform future treatment decisions.    MEDICATIONS:   Started alprazolam (Xanax) to address anxiety symptoms and help with marijuana use reduction.   Dequaysha to apply hydrocortisone cream to irritated skin area.    REFERRALS:   Referred to Dr. Cole Argueta at [facility] for ADHD assessment.    FOLLOW UP:   Follow up in 8 weeks.   Contact the office if needed before next appointment to report on medication effectiveness.              Follow up in about 2 months (around 8/24/2025).    This note was generated with the assistance of ambient listening technology. Verbal consent was obtained by the patient and accompanying visitor(s) for the recording of patient appointment to facilitate this note. I attest to having reviewed and edited the generated note for accuracy, though some syntax or spelling errors may persist. Please contact the author of this note for any clarification.

## 2025-07-08 ENCOUNTER — OFFICE VISIT (OUTPATIENT)
Dept: ORTHOPEDICS | Facility: CLINIC | Age: 35
End: 2025-07-08
Payer: COMMERCIAL

## 2025-07-08 ENCOUNTER — HOSPITAL ENCOUNTER (OUTPATIENT)
Dept: RADIOLOGY | Facility: HOSPITAL | Age: 35
Discharge: HOME | End: 2025-07-08
Attending: NURSE PRACTITIONER
Payer: COMMERCIAL

## 2025-07-08 ENCOUNTER — PATIENT MESSAGE (OUTPATIENT)
Dept: INTERNAL MEDICINE | Facility: CLINIC | Age: 35
End: 2025-07-08
Payer: COMMERCIAL

## 2025-07-08 VITALS — WEIGHT: 205.25 LBS | BODY MASS INDEX: 36.37 KG/M2 | HEIGHT: 63 IN

## 2025-07-08 DIAGNOSIS — F41.0 GENERALIZED ANXIETY DISORDER WITH PANIC ATTACKS: Primary | ICD-10-CM

## 2025-07-08 DIAGNOSIS — M25.562 LEFT KNEE PAIN, UNSPECIFIED CHRONICITY: Primary | ICD-10-CM

## 2025-07-08 DIAGNOSIS — M62.89 QUADRICEP TIGHTNESS: ICD-10-CM

## 2025-07-08 DIAGNOSIS — F41.1 GENERALIZED ANXIETY DISORDER WITH PANIC ATTACKS: Primary | ICD-10-CM

## 2025-07-08 DIAGNOSIS — M76.52 PATELLAR TENDINITIS OF LEFT KNEE: ICD-10-CM

## 2025-07-08 DIAGNOSIS — M25.562 LEFT KNEE PAIN, UNSPECIFIED CHRONICITY: ICD-10-CM

## 2025-07-08 DIAGNOSIS — M62.89 HAMSTRING TIGHTNESS OF LEFT LOWER EXTREMITY: Primary | ICD-10-CM

## 2025-07-08 PROCEDURE — 73564 X-RAY EXAM KNEE 4 OR MORE: CPT | Mod: 26,LT,, | Performed by: RADIOLOGY

## 2025-07-08 PROCEDURE — 73562 X-RAY EXAM OF KNEE 3: CPT | Mod: 26,RT,, | Performed by: RADIOLOGY

## 2025-07-08 PROCEDURE — 3008F BODY MASS INDEX DOCD: CPT | Mod: CPTII,S$GLB,, | Performed by: NURSE PRACTITIONER

## 2025-07-08 PROCEDURE — 99999 PR PBB SHADOW E&M-EST. PATIENT-LVL III: CPT | Mod: PBBFAC,,, | Performed by: NURSE PRACTITIONER

## 2025-07-08 PROCEDURE — 73564 X-RAY EXAM KNEE 4 OR MORE: CPT | Mod: TC,LT

## 2025-07-08 PROCEDURE — 97110 THERAPEUTIC EXERCISES: CPT | Mod: S$GLB,,, | Performed by: NURSE PRACTITIONER

## 2025-07-08 PROCEDURE — 1159F MED LIST DOCD IN RCRD: CPT | Mod: CPTII,S$GLB,, | Performed by: NURSE PRACTITIONER

## 2025-07-08 PROCEDURE — 99204 OFFICE O/P NEW MOD 45 MIN: CPT | Mod: S$GLB,,, | Performed by: NURSE PRACTITIONER

## 2025-07-08 RX ORDER — NAPROXEN 500 MG/1
500 TABLET ORAL 2 TIMES DAILY
Qty: 60 TABLET | Refills: 0 | Status: SHIPPED | OUTPATIENT
Start: 2025-07-08

## 2025-07-08 NOTE — PROGRESS NOTES
Mary Reich NP  [facility] [place]          Chief Complaint:   Chief Complaint   Patient presents with    Left Knee - Pain     In for left knee pain  On/Off for year  Pain 7/10 today          9/10 worse        History of Present Illness: Hesham Ordonez is a 35 y.o. female   who complains of left knee pain on and off for years.  When asked what type of activity she is doing she states she does the treadmill cardiovascular activity on a incline of 6 and sometimes 8.  Then she also mixes up that cardiovascular activity with stair climber.  She also mixes in weight lifting with upper and lower body.  She denies any injury she denies any mechanical symptoms of popping and catching.  She describes more tightness along the anterolateral knee and posterior knee.    ROS:   Neuro: Awake, alert and oriented  Pulm: no resp distress  Muscloskeletal:  Left anterior and posterior knee pain    Past Medical History:   Past Medical History:   Diagnosis Date    Anemia     Herpes simplex virus (HSV) infection     Hx of migraine headaches       Past Surgical History:   Procedure Laterality Date     SECTION      x3    CYST REMOVAL Right     Right arm     DILATION AND CURETTAGE OF UTERUS      TUBAL LIGATION        Family History   Problem Relation Name Age of Onset    No Known Problems Mother      Sleep apnea Father      Breast cancer Neg Hx      Colon cancer Neg Hx      Ovarian cancer Neg Hx      Uterine cancer Neg Hx      Cervical cancer Neg Hx        Social History[1]   Medication List with Changes/Refills   New Medications    NAPROXEN (NAPROSYN) 500 MG TABLET    Take 1 tablet (500 mg total) by mouth 2 (two) times daily.   Current Medications    ALBUTEROL (PROVENTIL/VENTOLIN HFA) 90 MCG/ACTUATION INHALER    Inhale 2 puffs into the lungs every 4 (four) hours as needed for Wheezing (cough).    ALPRAZOLAM (XANAX) 0.25 MG TABLET    Take 1 tablet (0.25 mg total) by mouth 2 (two) times daily as  "needed for Anxiety.    MUPIROCIN (BACTROBAN) 2 % OINTMENT    Apply topically 3 (three) times daily.    NEOMYCIN-POLYMYXIN-HYDROCORTISONE (CORTISPORIN) OTIC SOLUTION    Use two drops, twice daily to affected area    NYSTATIN (MYCOSTATIN) CREAM    Apply topically 2 (two) times daily. For skin rash      Review of patient's allergies indicates:   Allergen Reactions    Latex, natural rubber Itching    Morphine Itching, Other (See Comments) and Rash     Burning sensation          Physical Exam:   BMI: Body mass index is 36.36 kg/m². 35 y.o. female  5' 3" (1.6 m) 93.1 kg (205 lb 4 oz) Physical exam of the left knee demonstrates the patient has a non antalgic gait.  no visual abnormalities upon inspection no bony deformity, no soft tissue swelling, no effusion.  No infrapatellar fat pad swelling.  Putting the patient through range of motion does demonstrate full/hyper extension limited deep flexion.  There is no crepitance with range of motion.  Putting the patient through range of motion there is tightness noted with full deep flexion and flexion of the left hip.  There is slight lateral motion of the patella with quadriceps contraction. There is no quadricep atrophy or wasting. (-) medial and lateral Medina's.  There is tenderness to palpation along the posterior structures of the semi tendinosis and biceps femoris, most pain laterally and along the IT band.  Strength, sensation, and circulation are intact..       Imaging: Relevant imaging results reviewed and interpreted and discussed with the patient and/or family today.   X-ray Knee Ortho Left with Flexion (XPD)  Narrative: EXAMINATION:  XR KNEE ORTHO LEFT WITH FLEXION (XPD)    CLINICAL HISTORY:  . Pain in left knee    TECHNIQUE:  AP standing view of both knees, PA flexion standing views of both knees, and Merchant views of both knees were performed. A lateral view of the left knee was also performed.    COMPARISON:  None    FINDINGS:  No acute abnormality or " significant arthritic change.  Impression: As above    Electronically signed by: Eric Summers MD  Date:    07/08/2025  Time:    14:30       Assessment/Plan:  1. Hamstring tightness of left lower extremity    2. Quadricep tightness    3. Patellar tendinitis of left knee    Other orders  -     naproxen (NAPROSYN) 500 MG tablet; Take 1 tablet (500 mg total) by mouth 2 (two) times daily.  Dispense: 60 tablet; Refill: 0       Patient Instructions   PT/OT:   I will send the patient to formal physical therapy for hamstring quadricep patellar tendon stretching flexibility.     Medications:    Naproxen  500 mg b.i.d.     Education:    I personally spent 8 minutes developing, teaching, performing and explaining a patient self directed home exercise/stretching regimen for hamstring, patellar tendon, quadricep stretching flexibility along with modification of exercise routine .  Patient demonstrated understanding. All questions were answered and counseling provided to encourage patient to do these daily. CPT 52607-TS       Return to clinic:    4-6 weeks if symptoms do not subside      I discussed worrisome and red flag signs and symptoms with the patient. The patient expressed understanding and agreed to alert me immediately or to go to the emergency room if they experience any of these.   Treatment plan was developed with input from the patient/family, and they expressed understanding and agreement with the plan. All questions were answered today.             Mary Reich NP-C  Orthopedic Nurse Pracitioner  [facility]       [1]   Social History  Socioeconomic History    Marital status: Significant Other   Tobacco Use    Smoking status: Never     Passive exposure: Never    Smokeless tobacco: Never   Substance and Sexual Activity    Alcohol use: Yes     Alcohol/week: 1.0 standard drink of alcohol     Types: 1 Shots of liquor per week     Comment: occ    Drug use: Yes     Types: Marijuana     Comment: medical    Sexual  activity: Yes     Partners: Male     Birth control/protection: See Surgical Hx     Social Drivers of Health     Financial Resource Strain: Low Risk  (10/29/2021)    Overall Financial Resource Strain (CARDIA)     Difficulty of Paying Living Expenses: Not hard at all   Food Insecurity: No Food Insecurity (10/29/2021)    Hunger Vital Sign     Worried About Running Out of Food in the Last Year: Never true     Ran Out of Food in the Last Year: Never true   Transportation Needs: No Transportation Needs (10/29/2021)    PRAPARE - Transportation     Lack of Transportation (Medical): No     Lack of Transportation (Non-Medical): No   Stress: No Stress Concern Present (10/29/2021)    [employer] - Occupational Stress Questionnaire     Feeling of Stress : Not at all   Housing Stability: Low Risk  (10/29/2021)    Housing Stability Vital Sign     Unable to Pay for Housing in the Last Year: No     Number of Places Lived in the Last Year: 1     Unstable Housing in the Last Year: No

## 2025-07-08 NOTE — PATIENT INSTRUCTIONS
PT/OT:   I will send the patient to formal physical therapy for hamstring quadricep patellar tendon stretching flexibility.     Medications:    Naproxen  500 mg b.i.d.     Education:    I personally spent 8 minutes developing, teaching, performing and explaining a patient self directed home exercise/stretching regimen for hamstring, patellar tendon, quadricep stretching flexibility along with modification of exercise routine .  Patient demonstrated understanding. All questions were answered and counseling provided to encourage patient to do these daily. CPT 11549-XX       Return to clinic:    4-6 weeks if symptoms do not subside

## 2025-07-09 ENCOUNTER — TELEPHONE (OUTPATIENT)
Dept: INTERNAL MEDICINE | Facility: CLINIC | Age: 35
End: 2025-07-09
Payer: COMMERCIAL

## 2025-07-09 NOTE — TELEPHONE ENCOUNTER
I called Dr Cole Anne's office to check the status of a referral/consult to Adult Neuropsychology .  stated she is awaiting the pt last 2 chart notes before scheduling . I faxed both notes today 7/9/25

## 2025-07-15 RX ORDER — ALPRAZOLAM 0.5 MG/1
0.5 TABLET ORAL 2 TIMES DAILY PRN
Qty: 60 TABLET | Refills: 0 | Status: SHIPPED | OUTPATIENT
Start: 2025-07-15

## 2025-07-16 ENCOUNTER — OFFICE VISIT (OUTPATIENT)
Dept: OBSTETRICS AND GYNECOLOGY | Facility: CLINIC | Age: 35
End: 2025-07-16
Payer: COMMERCIAL

## 2025-07-16 ENCOUNTER — TELEPHONE (OUTPATIENT)
Dept: OBSTETRICS AND GYNECOLOGY | Facility: CLINIC | Age: 35
End: 2025-07-16
Payer: COMMERCIAL

## 2025-07-16 ENCOUNTER — LAB VISIT (OUTPATIENT)
Dept: LAB | Facility: HOSPITAL | Age: 35
End: 2025-07-16
Payer: COMMERCIAL

## 2025-07-16 VITALS
WEIGHT: 202.81 LBS | SYSTOLIC BLOOD PRESSURE: 124 MMHG | HEIGHT: 63 IN | BODY MASS INDEX: 35.93 KG/M2 | DIASTOLIC BLOOD PRESSURE: 86 MMHG

## 2025-07-16 DIAGNOSIS — Z11.3 SCREEN FOR STD (SEXUALLY TRANSMITTED DISEASE): Primary | ICD-10-CM

## 2025-07-16 DIAGNOSIS — Z11.3 SCREEN FOR STD (SEXUALLY TRANSMITTED DISEASE): ICD-10-CM

## 2025-07-16 LAB
HAV IGM SERPL QL IA: NORMAL
HBV CORE IGM SERPL QL IA: NORMAL
HBV SURFACE AG SERPL QL IA: NORMAL
HCV AB SERPL QL IA: NORMAL
HIV 1+2 AB+HIV1 P24 AG SERPL QL IA: NORMAL
T PALLIDUM IGG+IGM SER QL: NORMAL

## 2025-07-16 PROCEDURE — 99999 PR PBB SHADOW E&M-EST. PATIENT-LVL III: CPT | Mod: PBBFAC,,,

## 2025-07-16 PROCEDURE — 99213 OFFICE O/P EST LOW 20 MIN: CPT | Mod: S$GLB,,,

## 2025-07-16 PROCEDURE — 1160F RVW MEDS BY RX/DR IN RCRD: CPT | Mod: CPTII,S$GLB,,

## 2025-07-16 PROCEDURE — 86593 SYPHILIS TEST NON-TREP QUANT: CPT

## 2025-07-16 PROCEDURE — 3079F DIAST BP 80-89 MM HG: CPT | Mod: CPTII,S$GLB,,

## 2025-07-16 PROCEDURE — 3074F SYST BP LT 130 MM HG: CPT | Mod: CPTII,S$GLB,,

## 2025-07-16 PROCEDURE — 80074 ACUTE HEPATITIS PANEL: CPT

## 2025-07-16 PROCEDURE — 36415 COLL VENOUS BLD VENIPUNCTURE: CPT

## 2025-07-16 PROCEDURE — 87389 HIV-1 AG W/HIV-1&-2 AB AG IA: CPT

## 2025-07-16 PROCEDURE — 1159F MED LIST DOCD IN RCRD: CPT | Mod: CPTII,S$GLB,,

## 2025-07-16 PROCEDURE — 3008F BODY MASS INDEX DOCD: CPT | Mod: CPTII,S$GLB,,

## 2025-07-16 NOTE — TELEPHONE ENCOUNTER
Spoke with pt and notified that pt would need to see MD to discuss ablation/hysterectomy. Pt stated she has tubal and would like to discuss having a tubal reversal surgery. Informed pt that none of our providers do that surgery. Pt stated she still would like to come in and speak to provider. Pt stated she did not feel comfortable explaining what she is coming in for.

## 2025-07-16 NOTE — PROGRESS NOTES
Subjective:       Patient ID: Hesham Ordonez is a 35 y.o. female.    Chief Complaint:  STD CHECK      History of Present Illness  HPI  Vaginal Discharge and Irritation  Patient presents for vaginal discharge check. Sexual history reviewed with the patient. STD exposure: denies knowledge of risky exposure.  Previous history of STD:  none. Current symptoms include none.  Contraception: tubal ligation.    GYN & OB History  Patient's last menstrual period was 2025 (exact date).   Date of Last Pap: 2024    OB History    Para Term  AB Living   3 3 3 0 0 3   SAB IAB Ectopic Multiple Live Births   0 0 0 0 3      # Outcome Date GA Lbr Virgilio/2nd Weight Sex Type Anes PTL Lv   3 Term 18 39w3d  4.52 kg (9 lb 15.4 oz) M CS-LTranv Spinal N DANIEL   2 Term 16 41w1d  3.94 kg (8 lb 11 oz) M CS-LTranv EPI N DANIEL   1 Term 11/23/10 40w0d  3.771 kg (8 lb 5 oz) M CS-Unspec   ADNIEL      Complications: Preeclampsia       Review of Systems  Review of Systems   Constitutional: Negative.    HENT: Negative.     Eyes: Negative.    Respiratory: Negative.     Cardiovascular: Negative.    Gastrointestinal: Negative.    Genitourinary: Negative.    Musculoskeletal: Negative.    Integumentary:  Negative.   Neurological: Negative.    Hematological: Negative.    Psychiatric/Behavioral: Negative.     All other systems reviewed and are negative.  Breast: negative.            Objective:      Physical Exam:   Constitutional: She is oriented to person, place, and time. She appears well-developed and well-nourished.    HENT:   Head: Normocephalic and atraumatic.    Eyes: Pupils are equal, round, and reactive to light. Conjunctivae and EOM are normal.     Cardiovascular:  Normal rate, regular rhythm and normal heart sounds.             Pulmonary/Chest: Effort normal.        Abdominal: Soft. There is no abdominal tenderness.     Genitourinary:    Genitourinary Comments: deferred             Musculoskeletal: Normal range of motion  and moves all extremeties.       Neurological: She is alert and oriented to person, place, and time.    Skin: Skin is warm and dry. She is not diaphoretic.    Psychiatric: She has a normal mood and affect. Her behavior is normal. Judgment and thought content normal.             Assessment:        1. Screen for STD (sexually transmitted disease)               Plan:   Continue annual well woman exam.    Diagnosis and orders this visit:  Screen for STD (sexually transmitted disease)  -     C. trachomatis/N. gonorrhoeae by AMP DNA  -     HIV 1/2 Ag/Ab (4th Gen); Future; Expected date: 07/16/2025  -     Hepatitis Panel, Acute; Future; Expected date: 07/16/2025  -     Treponema Pallidium Antibodies IgG, IgM; Future; Expected date: 07/16/2025         Kristi Nunez, NP

## 2025-07-29 DIAGNOSIS — M76.52 PATELLAR TENDINITIS OF LEFT KNEE: Primary | ICD-10-CM

## 2025-07-29 DIAGNOSIS — M62.89 HAMSTRING TIGHTNESS OF LEFT LOWER EXTREMITY: ICD-10-CM

## 2025-07-29 DIAGNOSIS — M62.89 QUADRICEP TIGHTNESS: ICD-10-CM

## 2025-07-30 ENCOUNTER — TELEPHONE (OUTPATIENT)
Dept: INTERNAL MEDICINE | Facility: CLINIC | Age: 35
End: 2025-07-30
Payer: COMMERCIAL

## 2025-07-30 NOTE — TELEPHONE ENCOUNTER
I called to check the status of an Ambulatory referral/consult to Adult Neuropsychology placed by Dr Sierra to Dr Cole Anne's office . I spoke to Idania the  . Idania stated pt has an appt with Dr Anne on 8/20/25 at 8 am

## 2025-08-04 ENCOUNTER — CLINICAL SUPPORT (OUTPATIENT)
Dept: REHABILITATION | Facility: HOSPITAL | Age: 35
End: 2025-08-04
Payer: COMMERCIAL

## 2025-08-04 DIAGNOSIS — M25.562 CHRONIC PAIN OF LEFT KNEE: Primary | ICD-10-CM

## 2025-08-04 DIAGNOSIS — G89.29 CHRONIC PAIN OF LEFT KNEE: Primary | ICD-10-CM

## 2025-08-04 DIAGNOSIS — R29.898 WEAKNESS OF LEFT LOWER EXTREMITY: ICD-10-CM

## 2025-08-04 PROCEDURE — 97162 PT EVAL MOD COMPLEX 30 MIN: CPT

## 2025-08-04 PROCEDURE — 97112 NEUROMUSCULAR REEDUCATION: CPT

## 2025-08-05 PROBLEM — R29.898 WEAKNESS OF LEFT LOWER EXTREMITY: Status: ACTIVE | Noted: 2025-08-05

## 2025-08-07 ENCOUNTER — OFFICE VISIT (OUTPATIENT)
Dept: PRIMARY CARE CLINIC | Facility: CLINIC | Age: 35
End: 2025-08-07
Payer: COMMERCIAL

## 2025-08-07 VITALS
SYSTOLIC BLOOD PRESSURE: 118 MMHG | HEART RATE: 71 BPM | BODY MASS INDEX: 34.3 KG/M2 | DIASTOLIC BLOOD PRESSURE: 86 MMHG | OXYGEN SATURATION: 95 % | HEIGHT: 63 IN | TEMPERATURE: 98 F | WEIGHT: 193.56 LBS

## 2025-08-07 DIAGNOSIS — S05.91XA RIGHT EYE INJURY, INITIAL ENCOUNTER: Primary | ICD-10-CM

## 2025-08-07 DIAGNOSIS — H53.8 BLURRY VISION, RIGHT EYE: ICD-10-CM

## 2025-08-07 PROCEDURE — 3079F DIAST BP 80-89 MM HG: CPT | Mod: CPTII,S$GLB,, | Performed by: NURSE PRACTITIONER

## 2025-08-07 PROCEDURE — 1159F MED LIST DOCD IN RCRD: CPT | Mod: CPTII,S$GLB,, | Performed by: NURSE PRACTITIONER

## 2025-08-07 PROCEDURE — 3074F SYST BP LT 130 MM HG: CPT | Mod: CPTII,S$GLB,, | Performed by: NURSE PRACTITIONER

## 2025-08-07 PROCEDURE — 1160F RVW MEDS BY RX/DR IN RCRD: CPT | Mod: CPTII,S$GLB,, | Performed by: NURSE PRACTITIONER

## 2025-08-07 PROCEDURE — 99214 OFFICE O/P EST MOD 30 MIN: CPT | Mod: S$GLB,,, | Performed by: NURSE PRACTITIONER

## 2025-08-07 PROCEDURE — 99999 PR PBB SHADOW E&M-EST. PATIENT-LVL V: CPT | Mod: PBBFAC,,, | Performed by: NURSE PRACTITIONER

## 2025-08-07 PROCEDURE — 3008F BODY MASS INDEX DOCD: CPT | Mod: CPTII,S$GLB,, | Performed by: NURSE PRACTITIONER

## 2025-08-07 RX ORDER — MOXIFLOXACIN 5 MG/ML
1 SOLUTION/ DROPS OPHTHALMIC 4 TIMES DAILY
Qty: 3 ML | Refills: 0 | Status: SHIPPED | OUTPATIENT
Start: 2025-08-07

## 2025-08-07 NOTE — PROGRESS NOTES
Patient ID: Hesham Ordonez is a 35 y.o. female.    Chief Complaint: Stye    History of Present Illness    Ms. Ordonez presents today for eye discomfort.    She reports recent eye discomfort following hair washing. She describes slight blurry vision, which she attributes partially to her glasses. Her right eye has mild watering. She feels something rubbing against her eyelids and experiences facial pain. She reports general eye discomfort but cannot identify a visible bump or specific cause. She denies recent eye trauma. She describes sensation of irritation that she believes may have occurred during prolonged sink cleaning while detangling her hair. She purchased a stye ointment but was informed by clinician that symptoms do not appear consistent with a stye.          ROS:  General: -fever, -chills, -fatigue, -weight gain, -weight loss  Eyes: -vision changes, -redness, -discharge, +eye irritation, +blurry vision, +wear glasses or contacts, +eye watering  ENT: -ear pain, -nasal congestion, -sore throat  Cardiovascular: -chest pain, -palpitations, -lower extremity edema  Respiratory: -cough, -shortness of breath  Gastrointestinal: -abdominal pain, -nausea, -vomiting, -diarrhea, -constipation, -blood in stool  Genitourinary: -dysuria, -hematuria, -frequency  Musculoskeletal: -joint pain, -muscle pain  Skin: -rash, -lesion  Neurological: +headache, -dizziness, -numbness, -tingling  Psychiatric: -anxiety, -depression, -sleep difficulty  Head: +facial pain  Female Genitourinary: +abnormal menses, +absent or irregular periods         Wt Readings from Last 10 Encounters:   08/07/25 87.8 kg (193 lb 9 oz)   07/16/25 92 kg (202 lb 13.2 oz)   07/08/25 93.1 kg (205 lb 4 oz)   06/24/25 93.1 kg (205 lb 4 oz)   04/23/25 96.2 kg (212 lb 1.3 oz)   04/08/25 93.3 kg (205 lb 11 oz)   03/10/25 91.4 kg (201 lb 8 oz)   02/27/25 91.4 kg (201 lb 8 oz)   02/20/25 98 kg (216 lb 0.8 oz)   05/30/24 98 kg (216 lb 0.8 oz)       The ASCVD Risk score  (Laurent ROBLEDO, et al., 2019) failed to calculate for the following reasons:    The 2019 ASCVD risk score is only valid for ages 40 to 79    PAST MEDICAL HISTORY:  Past Medical History:   Diagnosis Date    Anemia     Herpes simplex virus (HSV) infection     Hx of migraine headaches        PAST SURGICAL HISTORY:  Past Surgical History:   Procedure Laterality Date     SECTION      x3    CYST REMOVAL Right     Right arm     DILATION AND CURETTAGE OF UTERUS      TUBAL LIGATION         SOCIAL HISTORY:  Social History[1]    FAMILY HISTORY:  Family History   Problem Relation Name Age of Onset    No Known Problems Mother      Sleep apnea Father      Breast cancer Neg Hx      Colon cancer Neg Hx      Ovarian cancer Neg Hx      Uterine cancer Neg Hx      Cervical cancer Neg Hx         ALLERGIES AND MEDICATIONS: updated and reviewed.  Review of patient's allergies indicates:   Allergen Reactions    Latex, natural rubber Itching    Morphine Itching, Other (See Comments) and Rash     Burning sensation     Current Medications[2]      Physical Exam  Constitutional:       Appearance: Normal appearance.   HENT:      Head: Normocephalic and atraumatic.      Right Ear: Tympanic membrane normal.      Left Ear: Tympanic membrane normal.      Nose: Nose normal.      Mouth/Throat:      Mouth: Mucous membranes are moist.   Eyes:      Conjunctiva/sclera:      Right eye: Right conjunctiva is not injected. No chemosis, exudate or hemorrhage.  Cardiovascular:      Rate and Rhythm: Normal rate.      Pulses: Normal pulses.   Pulmonary:      Effort: Pulmonary effort is normal.   Abdominal:      General: Bowel sounds are normal.   Musculoskeletal:         General: Normal range of motion.      Cervical back: Normal range of motion.   Skin:     General: Skin is warm and dry.      Capillary Refill: Capillary refill takes less than 2 seconds.   Neurological:      Mental Status: She is alert and oriented to person, place, and time.   Psychiatric:    "      Mood and Affect: Mood is anxious.          Assessment:   LABS:   Lab Results   Component Value Date    HGBA1C 5.4 05/08/2024    HGBA1C 5.2 07/28/2023    HGBA1C 5.0 10/21/2022      Lab Results   Component Value Date    CHOL 184 05/08/2024    CHOL 189 07/28/2023    CHOL 165 10/21/2022     Lab Results   Component Value Date    LDLCALC 104 05/08/2024    LDLCALC 122.6 07/28/2023    LDLCALC 100.4 10/21/2022     Lab Results   Component Value Date    WBC 5.05 02/27/2025    HGB 13.7 02/27/2025    HCT 42.2 02/27/2025     02/27/2025    CHOL 184 05/08/2024    TRIG 177 (H) 05/08/2024    HDL 45 (L) 05/08/2024    ALT 21 07/28/2023    AST 24 07/28/2023     07/28/2023    K 3.6 07/28/2023     07/28/2023    CREATININE 0.9 07/28/2023    BUN 12 07/28/2023    CO2 20 (L) 07/28/2023    TSH 0.824 02/27/2025    GLUF 79 08/24/2015    HGBA1C 5.4 05/08/2024       Plan:   Hesham "Haris" was seen today for stye.    Diagnoses and all orders for this visit:    Right eye injury, initial encounter  -     Ambulatory referral/consult to Optometry; Future  -     moxifloxacin (VIGAMOX) 0.5 % ophthalmic solution; Place 1 drop into the right eye 4 (four) times daily.    Blurry vision, right eye  -     Ambulatory referral/consult to Optometry; Future  -     moxifloxacin (VIGAMOX) 0.5 % ophthalmic solution; Place 1 drop into the right eye 4 (four) times daily.        Assessment & Plan      IMPRESSION:  -Assessed eye complaint, initially suspected as a sty.  -Ruled out sty due to absence of visible bump on eyelid.  -Considered corneal abrasion based on symptoms and recent eye irritation.  -Determined need for slit-lamp exam to confirm diagnosis and guide treatment.  -Noted potential for headaches due to eye irritation.  -Discussed potential need for antibiotics pending confirmation of diagnosis.    CORNEAL ABRASION, RIGHT EYE:  - Ms. Ordonez reports right eye irritation (described as feeling like sandpaper), blurry vision, and eye " watering.  - Suspect corneal abrasion based on symptoms.  - Explained the difference between hordeolum and corneal abrasion to patient.  - Described slit-lamp exam procedure with fluorescein dye for visualization.  - Referred patient to optometrist for slit-lamp exam and definitive diagnosis, with appointment being scheduled for tomorrow.  - Antibiotics will be considered if abrasion is confirmed, but treatment will not be initiated until then.  - Ms. Ordonez advised to follow up after optometrist appointment.    OCULAR PAIN, RIGHT EYE:  - Ms. Ordonez reports facial pain and right eye irritation.  - Symptoms evaluated and likely related to the suspected corneal abrasion.  - Further assessment via slit-lamp exam will help confirm the cause of ocular pain.  - Optometrist referral has been made as noted above.    DRY EYE, RIGHT EYE:  - Ms. Ordonez reports slight lacrimation of the right eye.  - No significant symptoms of dry eye syndrome noted.    TUBAL LIGATION STATUS:  - Confirmed patient has had a tubal ligation procedure.    IRREGULAR MENSTRUATION:  - Ms. Ordonez reports irregular menstruation and cycle changes following tubal ligation.  - She denies having polycystic ovarian syndrome and reports that hormonal contraceptives were previously ineffective in regulating her menstrual cycle.       FOLLOW-UP:  -RTC for worsening or ongoing symptoms.      I spent a total of 31 minutes face to face and non-face to face on the date of this visit.This includes time preparing to see the patient (eg, review of tests, notes), obtaining and/or reviewing additional history from an independent historian and/or outside medical records, documenting clinical information in the electronic health record, independently interpreting results and/or communicating results to the patient/family/caregiver, or care coordinator.  Visit today included increased complexity associated with the care of the episodic problem addressed and managing the  longitudinal care of the patient due to the serious and/or complex managed problem(s).      This note was generated with the assistance of ambient listening technology. Verbal consent was obtained by the patient and accompanying visitor(s) for the recording of patient appointment to facilitate this note. I attest to having reviewed and edited the generated note for accuracy, though some syntax or spelling errors may persist. Please contact the author of this note for any clarification.        Sincerely,  Freeman Streeter NP          [1]   Social History  Socioeconomic History    Marital status: Significant Other   Tobacco Use    Smoking status: Never     Passive exposure: Never    Smokeless tobacco: Never   Substance and Sexual Activity    Alcohol use: Yes     Alcohol/week: 1.0 standard drink of alcohol     Types: 1 Shots of liquor per week     Comment: occ    Drug use: Yes     Types: Marijuana     Comment: medical    Sexual activity: Yes     Partners: Male     Birth control/protection: See Surgical Hx     Social Drivers of Health     Financial Resource Strain: Low Risk  (10/29/2021)    Overall Financial Resource Strain (CARDIA)     Difficulty of Paying Living Expenses: Not hard at all   Food Insecurity: No Food Insecurity (10/29/2021)    Hunger Vital Sign     Worried About Running Out of Food in the Last Year: Never true     Ran Out of Food in the Last Year: Never true   Transportation Needs: No Transportation Needs (10/29/2021)    PRAPARE - Transportation     Lack of Transportation (Medical): No     Lack of Transportation (Non-Medical): No   Stress: No Stress Concern Present (10/29/2021)    [employer] - Occupational Stress Questionnaire     Feeling of Stress : Not at all   Housing Stability: Low Risk  (10/29/2021)    Housing Stability Vital Sign     Unable to Pay for Housing in the Last Year: No     Number of Places Lived in the Last Year: 1     Unstable Housing in the Last Year: No    [2]   Current Outpatient Medications   Medication Sig Dispense Refill    albuterol (PROVENTIL/VENTOLIN HFA) 90 mcg/actuation inhaler Inhale 2 puffs into the lungs every 4 (four) hours as needed for Wheezing (cough). 18 g 5    ALPRAZolam (XANAX) 0.5 MG tablet Take 1 tablet (0.5 mg total) by mouth 2 (two) times daily as needed for Anxiety. 60 tablet 0    moxifloxacin (VIGAMOX) 0.5 % ophthalmic solution Place 1 drop into the right eye 4 (four) times daily. 3 mL 0    mupirocin (BACTROBAN) 2 % ointment Apply topically 3 (three) times daily. 22 g 0    naproxen (NAPROSYN) 500 MG tablet Take 1 tablet (500 mg total) by mouth 2 (two) times daily. 60 tablet 0    neomycin-polymyxin-hydrocortisone (CORTISPORIN) otic solution Use two drops, twice daily to affected area 10 mL 0    nystatin (MYCOSTATIN) cream Apply topically 2 (two) times daily. For skin rash 30 g 2     No current facility-administered medications for this visit.

## 2025-08-08 ENCOUNTER — CLINICAL SUPPORT (OUTPATIENT)
Dept: REHABILITATION | Facility: HOSPITAL | Age: 35
End: 2025-08-08
Payer: COMMERCIAL

## 2025-08-08 DIAGNOSIS — R29.898 WEAKNESS OF LEFT LOWER EXTREMITY: ICD-10-CM

## 2025-08-08 DIAGNOSIS — G89.29 CHRONIC PAIN OF LEFT KNEE: Primary | ICD-10-CM

## 2025-08-08 DIAGNOSIS — M25.562 CHRONIC PAIN OF LEFT KNEE: Primary | ICD-10-CM

## 2025-08-08 PROCEDURE — 97112 NEUROMUSCULAR REEDUCATION: CPT

## 2025-08-08 PROCEDURE — 97110 THERAPEUTIC EXERCISES: CPT

## 2025-08-08 NOTE — PROGRESS NOTES
"  Outpatient Rehab    Physical Therapy Visit    Patient Name: Haris Ordonez  MRN: [id number]  YOB: 1990  Encounter Date: 8/8/2025    Therapy Diagnosis:   Encounter Diagnoses   Name Primary?    Chronic pain of left knee Yes    Weakness of left lower extremity      Physician: Mary Reich, NP    Physician Orders: Eval and Treat  Medical Diagnosis: Hamstring tightness of left lower extremity  Quadricep tightness  Patellar tendinitis of left knee  Surgical Diagnosis: Not applicable for this Episode   Surgical Date: Not applicable for this Episode  Days Since Last Surgery: Not applicable for this Episode  Visit # / Visits Authorized:  1 / 20  Insurance Authorization Period: 8/8/2025 to 12/31/2025  Date of Evaluation: 8/4/2025  Plan of Care Certification: 8/5/2025 to 9/29/2025      PT/PTA:     Number of PTA visits since last PT visit:   Time In: 0733   Time Out: 0830  Total Time (in minutes): 57   Total Billable Time (in minutes):  55    Subjective   Patient reports she was sore following initial evaluation but has been keeping up with HEP and feels this is helping..  Pain reported as 7/10. left knee  Objective        Treatment:    CPT Intervention Performed   Today Duration / Intensity   MT             TE Elliptical x 11 minutes     BFR  80% occlusion, blue cuff left leg x30 3x15  LAQ with 3#     Prone Quad Stretch x 3 x 30" bilateral     Knee Extension Machine       Hamstring Curls            NMR Single Leg Bridge x 3 x 10 bilateral     Side Lying Hip Series  Bilateral x  x  x Abduction x10   Circles Clock Wise x10  Circles Counter Clock Wise x10     Squats x 3 x 10 with pink loop around knees, cues to prevent knee valgus                                                    TA                             PLAN clamshells, side stepping, leg press          CPT Codes available for Billing:   (00) minutes of Manual therapy (MT) to improve pain and ROM.  (25) minutes of Therapeutic Exercise (TE) to develop " strength, endurance, range of motion, and flexibility.  (28) minutes of Neuromuscular Re-Education (NMR)  to improve: Balance, Coordination, Kinesthetic, Sense, Proprioception, and Posture.  (00) minutes of Therapeutic Activities (TA) to improve functional performance.  Vasopneumatic Device Therapy () for management of swelling/edema. (57618)  Unattended Electrical Stimulation (ES) for muscle performance or pain modulation.  BFR: Blood flow restriction applied during exercise    Assessment & Plan   Assessment: Patient tolerated session well. Patient able to demonstrate home exercise plan well with mild cueing for form. Blood flow restriction therapy performed today for quad strengthening without excessive loading for left knee. Improvement exhibited today with preventing knee valgus with squats.      The patient will continue to benefit from skilled outpatient physical therapy in order to address the deficits listed in the problem list on the initial evaluation, provide patient and family education, and maximize the patients level of independence in the home and community environments.     The patient's spiritual, cultural, and educational needs were considered, and the patient is agreeable to the plan of care and goals.      Plan: Continue Plan of Care (POC) and progress per patient tolerance. See treatment section for details on planned progressions next session.    Goals:   Active       Functional outcome       Patient will show a significant change in FOTO patient-reported outcome tool to goal score to demonstrate subjective improvement       Start:  08/04/25    Expected End:  09/29/25            Patient will demonstrate independence in home program for support of progression       Start:  08/04/25    Expected End:  09/02/25               Pain       Patient will report pain of 2-3/10 demonstrating a reduction of overall pain       Start:  08/04/25    Expected End:  09/29/25            Patient will report a 3  point reduction in pain.       Start:  08/04/25    Expected End:  09/02/25               Range of Motion       Patient will achieve bilateral knee ROM of  125-10 degrees        Start:  08/04/25    Expected End:  09/29/25               Strength       Patient will achieve bilateral knee extension strength of 90% LSI.       Start:  08/04/25    Expected End:  09/29/25                Cole Winn, PT

## 2025-08-26 ENCOUNTER — OFFICE VISIT (OUTPATIENT)
Dept: INTERNAL MEDICINE | Facility: CLINIC | Age: 35
End: 2025-08-26
Payer: COMMERCIAL

## 2025-08-26 VITALS
HEART RATE: 67 BPM | DIASTOLIC BLOOD PRESSURE: 78 MMHG | OXYGEN SATURATION: 97 % | BODY MASS INDEX: 35.9 KG/M2 | SYSTOLIC BLOOD PRESSURE: 114 MMHG | HEIGHT: 63 IN | TEMPERATURE: 98 F | WEIGHT: 202.63 LBS

## 2025-08-26 DIAGNOSIS — F90.0 ATTENTION DEFICIT HYPERACTIVITY DISORDER (ADHD), PREDOMINANTLY INATTENTIVE TYPE: Primary | ICD-10-CM

## 2025-08-26 DIAGNOSIS — N92.6 ABNORMAL MENSTRUAL CYCLE: ICD-10-CM

## 2025-08-26 DIAGNOSIS — F41.1 GAD (GENERALIZED ANXIETY DISORDER): ICD-10-CM

## 2025-08-26 PROCEDURE — 1160F RVW MEDS BY RX/DR IN RCRD: CPT | Mod: CPTII,S$GLB,, | Performed by: FAMILY MEDICINE

## 2025-08-26 PROCEDURE — 99214 OFFICE O/P EST MOD 30 MIN: CPT | Mod: S$GLB,,, | Performed by: FAMILY MEDICINE

## 2025-08-26 PROCEDURE — 3008F BODY MASS INDEX DOCD: CPT | Mod: CPTII,S$GLB,, | Performed by: FAMILY MEDICINE

## 2025-08-26 PROCEDURE — 3078F DIAST BP <80 MM HG: CPT | Mod: CPTII,S$GLB,, | Performed by: FAMILY MEDICINE

## 2025-08-26 PROCEDURE — 1159F MED LIST DOCD IN RCRD: CPT | Mod: CPTII,S$GLB,, | Performed by: FAMILY MEDICINE

## 2025-08-26 PROCEDURE — 99999 PR PBB SHADOW E&M-EST. PATIENT-LVL V: CPT | Mod: PBBFAC,,, | Performed by: FAMILY MEDICINE

## 2025-08-26 PROCEDURE — 3074F SYST BP LT 130 MM HG: CPT | Mod: CPTII,S$GLB,, | Performed by: FAMILY MEDICINE

## 2025-08-26 RX ORDER — DEXTROAMPHETAMINE SACCHARATE, AMPHETAMINE ASPARTATE MONOHYDRATE, DEXTROAMPHETAMINE SULFATE AND AMPHETAMINE SULFATE 2.5; 2.5; 2.5; 2.5 MG/1; MG/1; MG/1; MG/1
CAPSULE, EXTENDED RELEASE ORAL
Qty: 60 CAPSULE | Refills: 0 | Status: SHIPPED | OUTPATIENT
Start: 2025-08-26

## (undated) DEVICE — TAPE MEDIPORE 3 X 10YD

## (undated) DEVICE — TAPE SILK 3IN

## (undated) DEVICE — DRESSING AQUACEL EXTRA 10X12.5

## (undated) DEVICE — PAD ABD 8X10 STERILE

## (undated) DEVICE — SPRAY MASTISOL